# Patient Record
Sex: FEMALE | Race: WHITE | NOT HISPANIC OR LATINO | Employment: UNEMPLOYED | URBAN - METROPOLITAN AREA
[De-identification: names, ages, dates, MRNs, and addresses within clinical notes are randomized per-mention and may not be internally consistent; named-entity substitution may affect disease eponyms.]

---

## 2018-02-02 ENCOUNTER — HOSPITAL ENCOUNTER (EMERGENCY)
Facility: HOSPITAL | Age: 63
Discharge: HOME | End: 2018-02-02
Attending: EMERGENCY MEDICINE
Payer: MEDICARE

## 2018-02-02 VITALS
RESPIRATION RATE: 20 BRPM | BODY MASS INDEX: 21.17 KG/M2 | OXYGEN SATURATION: 99 % | HEIGHT: 59 IN | TEMPERATURE: 98 F | WEIGHT: 105 LBS | SYSTOLIC BLOOD PRESSURE: 175 MMHG | DIASTOLIC BLOOD PRESSURE: 88 MMHG | HEART RATE: 83 BPM

## 2018-02-02 DIAGNOSIS — M54.9 BACK PAIN, UNSPECIFIED BACK LOCATION, UNSPECIFIED BACK PAIN LATERALITY, UNSPECIFIED CHRONICITY: Primary | ICD-10-CM

## 2018-02-02 DIAGNOSIS — R07.9 CHEST PAIN: ICD-10-CM

## 2018-02-02 DIAGNOSIS — M94.0 COSTOCHONDRITIS: ICD-10-CM

## 2018-02-02 LAB
BILIRUB UR QL STRIP: NEGATIVE
CLARITY UR: CLEAR
COLOR UR: YELLOW
GLUCOSE UR QL STRIP: NEGATIVE
HGB UR QL STRIP: ABNORMAL
KETONES UR QL STRIP: NEGATIVE
LEUKOCYTE ESTERASE UR QL STRIP: NEGATIVE
MICROSCOPIC COMMENT: NORMAL
NITRITE UR QL STRIP: NEGATIVE
PH UR STRIP: 6 [PH] (ref 5–8)
PROT UR QL STRIP: NEGATIVE
RBC #/AREA URNS HPF: 2 /HPF (ref 0–4)
SP GR UR STRIP: <=1.005 (ref 1–1.03)
SQUAMOUS #/AREA URNS HPF: 0 /HPF
URN SPEC COLLECT METH UR: ABNORMAL
UROBILINOGEN UR STRIP-ACNC: NEGATIVE EU/DL

## 2018-02-02 PROCEDURE — 93010 ELECTROCARDIOGRAM REPORT: CPT | Mod: ,,, | Performed by: INTERNAL MEDICINE

## 2018-02-02 PROCEDURE — 99284 EMERGENCY DEPT VISIT MOD MDM: CPT | Mod: 25

## 2018-02-02 PROCEDURE — 63600175 PHARM REV CODE 636 W HCPCS: Performed by: PHYSICIAN ASSISTANT

## 2018-02-02 PROCEDURE — 81000 URINALYSIS NONAUTO W/SCOPE: CPT

## 2018-02-02 PROCEDURE — 93005 ELECTROCARDIOGRAM TRACING: CPT

## 2018-02-02 PROCEDURE — 96372 THER/PROPH/DIAG INJ SC/IM: CPT

## 2018-02-02 RX ORDER — KETOROLAC TROMETHAMINE 30 MG/ML
30 INJECTION, SOLUTION INTRAMUSCULAR; INTRAVENOUS
Status: COMPLETED | OUTPATIENT
Start: 2018-02-02 | End: 2018-02-02

## 2018-02-02 RX ORDER — NAPROXEN 500 MG/1
500 TABLET ORAL 2 TIMES DAILY WITH MEALS
Qty: 14 TABLET | Refills: 0 | Status: SHIPPED | OUTPATIENT
Start: 2018-02-02 | End: 2018-03-21

## 2018-02-02 RX ADMIN — KETOROLAC TROMETHAMINE 30 MG: 30 INJECTION, SOLUTION INTRAMUSCULAR at 05:02

## 2018-02-02 NOTE — ED PROVIDER NOTES
"Encounter Date: 2/2/2018       History     Chief Complaint   Patient presents with    Back Pain     pt states she began having lower back about 1 month ago, then pain spread up to her upper back about 1 week ago. States pain has now spread to right shoulder, right neck, and midsternal chest. Pt states she has also been having intermittent palpitations     Gena Lui, a 62 y.o. female with PMH of seizures and CVA that presents to the ED for pain in her lower back x 1 month that now radiates to her upper back and right shoulder with chest pain and palpitations that started about 4 days ago.  Pain to chest is described as "twitches" that come and go.  She's identified no exacerbating or alleviating factors.  Treatments tried include ingestion of goodies with little improvement of her symptoms. She denies any N/V/D.  Does attest to positive smoking history about 1 pack since she was 14 years old.  She does attest to positive smoking history.          The history is provided by the patient.   b  Review of patient's allergies indicates:   Allergen Reactions    Klonopin [clonazepam]     Valium [diazepam]      States she was told to "stay away from the family of valium."    Versed [midazolam]      Intravenous versed causes altered mental status     Past Medical History:   Diagnosis Date    Anxiety     Depression     Hyperlipidemia     Hypertension     Memory loss     Seizures     Stroke      Past Surgical History:   Procedure Laterality Date    ANTERIOR VAGINAL REPAIR      CHOLECYSTECTOMY      EYE SURGERY      TONSILLECTOMY       History reviewed. No pertinent family history.  Social History   Substance Use Topics    Smoking status: Current Every Day Smoker     Types: Cigarettes    Smokeless tobacco: Not on file    Alcohol use No     Review of Systems   Constitutional: Negative for fever.   Respiratory: Negative for cough and shortness of breath.    Cardiovascular: Positive for chest pain and " palpitations.   Gastrointestinal: Negative for diarrhea, nausea and vomiting.   Musculoskeletal: Negative for arthralgias and joint swelling.   Skin: Negative for color change and rash.   Allergic/Immunologic: Negative for immunocompromised state.   Neurological: Negative for dizziness and headaches.   Hematological: Negative for adenopathy.   Psychiatric/Behavioral: Negative for agitation and confusion.   All other systems reviewed and are negative.      Physical Exam     Initial Vitals [02/02/18 1540]   BP Pulse Resp Temp SpO2   (!) 196/93 83 20 97.6 °F (36.4 °C) 99 %      MAP       127.33         Physical Exam    Nursing note and vitals reviewed.  Constitutional: She appears well-developed and well-nourished. She is not diaphoretic. No distress.   HENT:   Head: Normocephalic and atraumatic.   Right Ear: External ear normal.   Left Ear: External ear normal.   Nose: Nose normal.   Mouth/Throat: Oropharynx is clear and moist.   Eyes: Conjunctivae and EOM are normal. Right eye exhibits no discharge. Left eye exhibits no discharge. No scleral icterus.   Neck: Normal range of motion. No tracheal deviation present.   Cardiovascular: Normal rate, regular rhythm and normal heart sounds. Exam reveals no gallop.    Pulmonary/Chest: Breath sounds normal. No respiratory distress. She has no decreased breath sounds. She has no wheezes. She has no rhonchi. She has no rales. She exhibits tenderness and bony tenderness. She exhibits no edema and no swelling.       Abdominal: Soft. Bowel sounds are normal. She exhibits no distension. There is no tenderness. There is no rebound and no guarding.   Musculoskeletal: Normal range of motion.        Cervical back: Normal.        Thoracic back: Normal.        Lumbar back: Normal.   Neurological: She is alert and oriented to person, place, and time.   Skin: Skin is warm and dry. No rash noted. No erythema.   Psychiatric: She has a normal mood and affect. Thought content normal.         ED  Course   Procedures  Labs Reviewed   URINALYSIS     EKG Readings: (Independently Interpreted)   Initial Reading: No STEMI. Rhythm: Normal Sinus Rhythm. Heart Rate: 81. Ectopy: No Ectopy. Conduction: Normal. Axis: Normal.     Imaging Results    None            Medical Decision Making:   Initial Assessment:   Lower back pain x 1 month with radiation to upper back and arm.  Reproducible chest pain  Differential Diagnosis:   Costochondritis, chronic back pain, muscular strain   Clinical Tests:   Lab Tests: Ordered and Reviewed  The following lab test(s) were unremarkable: Urinalysis  Radiological Study: Ordered and Reviewed  ED Management:  Patient presents to ED in NAD, afebrile and non-toxic.  She has no spinous process tenderness or bony step offs.  Normal AROM of bilateral shoulders with no ecchymosis or effusion.  Normal EKG and CXR.  No evidence of UTI.  Patient was given toradol with improvement of her back pain and instructed to f/u with PCP for further evaluation.  Strict return precautions given and patient verbalized understanding.                       ED Course      Clinical Impression:   The primary encounter diagnosis was Back pain, unspecified back location, unspecified back pain laterality, unspecified chronicity. Diagnoses of Chest pain and Costochondritis were also pertinent to this visit.                           Maria Luisa Farooq PA-C  02/02/18 4519

## 2018-02-03 NOTE — ED TRIAGE NOTES
"Pt reports lower back pain for the past 1 month that has been radiating up spine to between shoulder blades over the past week. Pt also reports occasional "twinges" to lower sternal area.   "

## 2018-03-21 ENCOUNTER — LAB VISIT (OUTPATIENT)
Dept: LAB | Facility: HOSPITAL | Age: 63
End: 2018-03-21
Attending: FAMILY MEDICINE
Payer: MEDICARE

## 2018-03-21 ENCOUNTER — OFFICE VISIT (OUTPATIENT)
Dept: FAMILY MEDICINE | Facility: CLINIC | Age: 63
End: 2018-03-21
Payer: MEDICARE

## 2018-03-21 ENCOUNTER — TELEPHONE (OUTPATIENT)
Dept: FAMILY MEDICINE | Facility: CLINIC | Age: 63
End: 2018-03-21

## 2018-03-21 VITALS
SYSTOLIC BLOOD PRESSURE: 126 MMHG | BODY MASS INDEX: 19.69 KG/M2 | HEART RATE: 83 BPM | TEMPERATURE: 98 F | DIASTOLIC BLOOD PRESSURE: 75 MMHG | HEIGHT: 59 IN | WEIGHT: 97.69 LBS

## 2018-03-21 DIAGNOSIS — Z78.0 POSTMENOPAUSAL: ICD-10-CM

## 2018-03-21 DIAGNOSIS — Z11.59 NEED FOR HEPATITIS C SCREENING TEST: ICD-10-CM

## 2018-03-21 DIAGNOSIS — R56.9 SEIZURES: ICD-10-CM

## 2018-03-21 DIAGNOSIS — Z23 NEED FOR SHINGLES VACCINE: ICD-10-CM

## 2018-03-21 DIAGNOSIS — E78.2 MIXED HYPERLIPIDEMIA: ICD-10-CM

## 2018-03-21 DIAGNOSIS — Z86.73 HISTORY OF STROKE: ICD-10-CM

## 2018-03-21 DIAGNOSIS — I10 BENIGN ESSENTIAL HYPERTENSION: ICD-10-CM

## 2018-03-21 DIAGNOSIS — M54.50 CHRONIC BILATERAL LOW BACK PAIN WITHOUT SCIATICA: ICD-10-CM

## 2018-03-21 DIAGNOSIS — Z79.82 LONG-TERM USE OF ASPIRIN THERAPY: ICD-10-CM

## 2018-03-21 DIAGNOSIS — Z00.00 ROUTINE GENERAL MEDICAL EXAMINATION AT A HEALTH CARE FACILITY: ICD-10-CM

## 2018-03-21 DIAGNOSIS — F32.A ANXIETY AND DEPRESSION: ICD-10-CM

## 2018-03-21 DIAGNOSIS — Z72.0 TOBACCO ABUSE: ICD-10-CM

## 2018-03-21 DIAGNOSIS — Z00.00 ROUTINE GENERAL MEDICAL EXAMINATION AT A HEALTH CARE FACILITY: Primary | ICD-10-CM

## 2018-03-21 DIAGNOSIS — Z23 NEEDS FLU SHOT: ICD-10-CM

## 2018-03-21 DIAGNOSIS — Z12.31 ENCOUNTER FOR SCREENING MAMMOGRAM FOR BREAST CANCER: ICD-10-CM

## 2018-03-21 DIAGNOSIS — R45.851 SUICIDAL THOUGHTS: ICD-10-CM

## 2018-03-21 DIAGNOSIS — F41.9 ANXIETY AND DEPRESSION: ICD-10-CM

## 2018-03-21 DIAGNOSIS — G89.29 CHRONIC BILATERAL LOW BACK PAIN WITHOUT SCIATICA: ICD-10-CM

## 2018-03-21 DIAGNOSIS — Z12.11 COLON CANCER SCREENING: ICD-10-CM

## 2018-03-21 LAB
25(OH)D3+25(OH)D2 SERPL-MCNC: 26 NG/ML
ALBUMIN SERPL BCP-MCNC: 4.5 G/DL
ALP SERPL-CCNC: 107 U/L
ALT SERPL W/O P-5'-P-CCNC: 15 U/L
ANION GAP SERPL CALC-SCNC: 8 MMOL/L
AST SERPL-CCNC: 20 U/L
BASOPHILS # BLD AUTO: 0.05 K/UL
BASOPHILS NFR BLD: 0.7 %
BILIRUB SERPL-MCNC: 0.6 MG/DL
BUN SERPL-MCNC: 6 MG/DL
CALCIUM SERPL-MCNC: 10.6 MG/DL
CHLORIDE SERPL-SCNC: 105 MMOL/L
CHOLEST SERPL-MCNC: 247 MG/DL
CHOLEST/HDLC SERPL: 5.6 {RATIO}
CO2 SERPL-SCNC: 28 MMOL/L
CREAT SERPL-MCNC: 0.8 MG/DL
DIFFERENTIAL METHOD: ABNORMAL
EOSINOPHIL # BLD AUTO: 0.1 K/UL
EOSINOPHIL NFR BLD: 0.9 %
ERYTHROCYTE [DISTWIDTH] IN BLOOD BY AUTOMATED COUNT: 13.7 %
EST. GFR  (AFRICAN AMERICAN): >60 ML/MIN/1.73 M^2
EST. GFR  (NON AFRICAN AMERICAN): >60 ML/MIN/1.73 M^2
ESTIMATED AVG GLUCOSE: 91 MG/DL
GLUCOSE SERPL-MCNC: 96 MG/DL
HBA1C MFR BLD HPLC: 4.8 %
HCT VFR BLD AUTO: 44.8 %
HDLC SERPL-MCNC: 44 MG/DL
HDLC SERPL: 17.8 %
HGB BLD-MCNC: 14.9 G/DL
LDLC SERPL CALC-MCNC: 153.8 MG/DL
LYMPHOCYTES # BLD AUTO: 1.5 K/UL
LYMPHOCYTES NFR BLD: 20.5 %
MCH RBC QN AUTO: 31.6 PG
MCHC RBC AUTO-ENTMCNC: 33.3 G/DL
MCV RBC AUTO: 95 FL
MONOCYTES # BLD AUTO: 0.7 K/UL
MONOCYTES NFR BLD: 8.9 %
NEUTROPHILS # BLD AUTO: 5.2 K/UL
NEUTROPHILS NFR BLD: 68.6 %
NONHDLC SERPL-MCNC: 203 MG/DL
PLATELET # BLD AUTO: 261 K/UL
PMV BLD AUTO: 10 FL
POTASSIUM SERPL-SCNC: 4.5 MMOL/L
PROT SERPL-MCNC: 8.4 G/DL
RBC # BLD AUTO: 4.71 M/UL
SODIUM SERPL-SCNC: 141 MMOL/L
TRIGL SERPL-MCNC: 246 MG/DL
TSH SERPL DL<=0.005 MIU/L-ACNC: 2.76 UIU/ML
WBC # BLD AUTO: 7.52 K/UL

## 2018-03-21 PROCEDURE — 36415 COLL VENOUS BLD VENIPUNCTURE: CPT

## 2018-03-21 PROCEDURE — 85025 COMPLETE CBC W/AUTO DIFF WBC: CPT

## 2018-03-21 PROCEDURE — 86803 HEPATITIS C AB TEST: CPT

## 2018-03-21 PROCEDURE — 80053 COMPREHEN METABOLIC PANEL: CPT

## 2018-03-21 PROCEDURE — 82306 VITAMIN D 25 HYDROXY: CPT

## 2018-03-21 PROCEDURE — 99386 PREV VISIT NEW AGE 40-64: CPT | Mod: 25,S$GLB,, | Performed by: FAMILY MEDICINE

## 2018-03-21 PROCEDURE — 80061 LIPID PANEL: CPT

## 2018-03-21 PROCEDURE — 84443 ASSAY THYROID STIM HORMONE: CPT

## 2018-03-21 PROCEDURE — 90686 IIV4 VACC NO PRSV 0.5 ML IM: CPT | Mod: S$GLB,,, | Performed by: FAMILY MEDICINE

## 2018-03-21 PROCEDURE — 99499 UNLISTED E&M SERVICE: CPT | Mod: S$GLB,,, | Performed by: FAMILY MEDICINE

## 2018-03-21 PROCEDURE — G0008 ADMIN INFLUENZA VIRUS VAC: HCPCS | Mod: S$GLB,,, | Performed by: FAMILY MEDICINE

## 2018-03-21 PROCEDURE — 99999 PR PBB SHADOW E&M-EST. PATIENT-LVL V: CPT | Mod: PBBFAC,,, | Performed by: FAMILY MEDICINE

## 2018-03-21 PROCEDURE — 83036 HEMOGLOBIN GLYCOSYLATED A1C: CPT

## 2018-03-21 RX ORDER — LEVETIRACETAM 500 MG/1
500 TABLET ORAL 2 TIMES DAILY
Qty: 180 TABLET | Refills: 3 | Status: SHIPPED | OUTPATIENT
Start: 2018-03-21 | End: 2018-12-26 | Stop reason: SDUPTHER

## 2018-03-21 RX ORDER — ATORVASTATIN CALCIUM 20 MG/1
20 TABLET, FILM COATED ORAL DAILY
Qty: 90 TABLET | Refills: 3 | Status: SHIPPED | OUTPATIENT
Start: 2018-03-21 | End: 2019-01-31

## 2018-03-21 RX ORDER — FLUOXETINE HYDROCHLORIDE 40 MG/1
40 CAPSULE ORAL DAILY
Qty: 90 CAPSULE | Refills: 3 | Status: SHIPPED | OUTPATIENT
Start: 2018-03-21 | End: 2019-01-31

## 2018-03-21 RX ORDER — ALPRAZOLAM 1 MG/1
TABLET ORAL
Qty: 30 TABLET | Refills: 2 | Status: SHIPPED | OUTPATIENT
Start: 2018-03-21 | End: 2018-04-09 | Stop reason: DRUGHIGH

## 2018-03-21 RX ORDER — NAPROXEN SODIUM 220 MG/1
81 TABLET, FILM COATED ORAL NIGHTLY
COMMUNITY
End: 2022-08-01 | Stop reason: SDUPTHER

## 2018-03-21 RX ORDER — VENLAFAXINE 100 MG/1
TABLET ORAL
Qty: 180 TABLET | Refills: 3 | Status: SHIPPED | OUTPATIENT
Start: 2018-03-21 | End: 2018-03-21 | Stop reason: CLARIF

## 2018-03-21 NOTE — PROGRESS NOTES
Office Visit    Patient Name: Gena Lui    : 1955  MRN: [id number]    Subjective:  Gena is a 62 y.o. female who presents today for:    Establish Care    61 yo female who resented to ER on 2018 for back pain * 1 month and d/c'd with DX of LBP and costochondritis s/p unremarkable CXR and EKG and improvement with toradol.   She reports a PMH of seizures and CVA (per CT  10/19/2017- chronic right parietal subcortical infarct) with underlying HTN and HLD. Currently only on aspirin. Also anxiety/depression. She is a smoker-- 1PPD for >40 years. She is a regular drinkerShe has a seizure history and has been off of Keppra for 2 years with some worsening frequency.     She is here today for new patient physical and to establish care. She is currently living with her grandchildren at her home in [place].     Gena Lui presents today for annual wellness exam.      She is postmenopausal.  She does not have a gynecologist and is overdue for pap.    They have been feeling stable-- no acute issues.     General lifestyle habits are as follows:  Diet is described as poor-- eats one meals per day due to poor appetite, exercise is described as fair-- [place], sleep is described as poor-- only 2-3 hour stretches at a time. Weight is poor-- loosing weight and with poor appetite and diet.     Immunizations: TDaP  in ED, FLU due, Pneumovax 23 given early due to smoking history, zostavax due    Screening Tests: PAP, mammogram ordered, colonoscopy due and ordered, hep C screening ordered today , early DEXA due to smoking history ordered    Eye/Dental: both due            Past Medical History  Past Medical History:   Diagnosis Date    Anxiety and depression 3/21/2018    Benign essential hypertension 3/21/2018    Chronic bilateral low back pain without sciatica 3/21/2018    History of stroke 3/21/2018    Memory loss     Mixed hyperlipidemia 3/21/2018    Seizures   "      Past Surgical History  Past Surgical History:   Procedure Laterality Date    ANTERIOR VAGINAL REPAIR      CHOLECYSTECTOMY      EYE SURGERY      TONSILLECTOMY         Family History  History reviewed. No pertinent family history.    Social History  Social History     Social History    Marital status:      Spouse name: N/A    Number of children: N/A    Years of education: N/A     Occupational History    Not on file.     Social History Main Topics    Smoking status: Current Every Day Smoker     Types: Cigarettes    Smokeless tobacco: Never Used      Comment: 1 PPD    Alcohol use Yes      Comment: 1-2 drinks daily    Drug use: Yes     Types: Marijuana    Sexual activity: Not on file     Other Topics Concern    Not on file     Social History Narrative    No narrative on file       Current Medications  Medications reviewed and updated.     Allergies   Review of patient's allergies indicates:   Allergen Reactions    Klonopin [clonazepam]     Valium [diazepam]      States she was told to "stay away from the family of valium."    Versed [midazolam]      Intravenous versed causes altered mental status       Review of Systems (Pertinent positives)  Review of Systems   Constitutional: Negative for unexpected weight change.   HENT: Positive for congestion and hearing loss.    Respiratory: Positive for shortness of breath.    Cardiovascular: Positive for palpitations. Negative for chest pain.   Gastrointestinal: Positive for diarrhea (loose stool).   Genitourinary: Negative for dysuria.   Musculoskeletal: Positive for back pain.   Allergic/Immunologic: Positive for environmental allergies.   Neurological: Positive for seizures. Negative for syncope.   Psychiatric/Behavioral: Positive for dysphoric mood, sleep disturbance and suicidal ideas. The patient is nervous/anxious.        /75 (BP Location: Left arm, Patient Position: Sitting, BP Method: Medium (Automatic))   Pulse 83   Temp 98 °F " "(36.7 °C)   Ht 4' 11" (1.499 m)   Wt 44.3 kg (97 lb 10.6 oz)   BMI 19.73 kg/m²     Physical Exam   Constitutional: She is oriented to person, place, and time. She appears well-developed. No distress.   Frail, poorly nourished   HENT:   Head: Normocephalic and atraumatic.   Right Ear: Ear canal normal. Tympanic membrane is not erythematous and not bulging.   Left Ear: Ear canal normal. Tympanic membrane is not erythematous and not bulging.   Nose: Rhinorrhea present.   Mouth/Throat: Dental caries present. No oropharyngeal exudate.   Eyes: Conjunctivae are normal.   Neck: Carotid bruit is not present. No thyroid mass and no thyromegaly present.   Cardiovascular: Normal rate, regular rhythm and normal heart sounds.    No murmur heard.  Pulses:       Dorsalis pedis pulses are 2+ on the right side, and 2+ on the left side.   Pulmonary/Chest: Effort normal and breath sounds normal. No respiratory distress.   Abdominal: Soft. Bowel sounds are normal. She exhibits no distension and no mass. There is no hepatosplenomegaly. There is no tenderness.   Musculoskeletal: Normal range of motion.   Lymphadenopathy:     She has no cervical adenopathy.   Neurological: She is alert and oriented to person, place, and time.   Skin: Skin is warm and dry. No rash noted.   Psychiatric: She has a normal mood and affect.   Vitals reviewed.        Assessment/Plan:  Gena Lui is a 62 y.o. female who presents today for :    Gena was seen today for establish care.    Diagnoses and all orders for this visit:    Routine general medical examination at a health care facility  Comments:  multiple areas of health care mintenance due and ordered.  follow up in 6 weeks   Orders:  -     Hemoglobin A1c; Future  -     Comprehensive metabolic panel; Future  -     Lipid panel; Future  -     CBC auto differential; Future  -     TSH; Future  -     Hepatitis C antibody; Future  -     Vitamin D; Future    Anxiety and depression  -     Discontinue: " venlafaxine (EFFEXOR) 100 MG Tab; Take twice daily for depression and anxiety- start w/ 1/2 tab twice daily for 1st 2 weeks  -     FLUoxetine (PROZAC) 40 MG capsule; Take 1 capsule (40 mg total) by mouth once daily.  -     ALPRAZolam (XANAX) 1 MG tablet; 1/2-1 tab daily as needed for severe anxiety    Benign essential hypertension  -     Comprehensive metabolic panel; Future  -     Lipid panel; Future  -     CBC auto differential; Future  -     TSH; Future  -     atorvastatin (LIPITOR) 20 MG tablet; Take 1 tablet (20 mg total) by mouth once daily.  -     Ambulatory referral to Optometry    Mixed hyperlipidemia  -     Comprehensive metabolic panel; Future  -     Lipid panel; Future  -     TSH; Future  -     atorvastatin (LIPITOR) 20 MG tablet; Take 1 tablet (20 mg total) by mouth once daily.    History of stroke  -     Comprehensive metabolic panel; Future  -     Lipid panel; Future  -     Ambulatory referral to Optometry    Chronic bilateral low back pain without sciatica    Need for hepatitis C screening test  -     Hepatitis C antibody; Future    Encounter for screening mammogram for breast cancer  -     Mammo Digital Screening Bilat with CAD; Future    Postmenopausal  -     DXA Bone Density Spine And Hip; Future    Long-term use of aspirin therapy    Seizures  -     levETIRAcetam (KEPPRA) 500 MG Tab; Take 1 tablet (500 mg total) by mouth 2 (two) times daily.    Colon cancer screening  -     Case request GI: COLONOSCOPY    Body mass index (BMI) 19.9 or less, adult    Suicidal thoughts  Comments:  safety precautions in place (firearm out of home), safety plan discussed, treating depression  Orders:  -     Discontinue: venlafaxine (EFFEXOR) 100 MG Tab; Take twice daily for depression and anxiety- start w/ 1/2 tab twice daily for 1st 2 weeks  -     FLUoxetine (PROZAC) 40 MG capsule; Take 1 capsule (40 mg total) by mouth once daily.    Needs flu shot  -     Influenza - Quadrivalent (3 years & older) (PF)    Need for  shingles vaccine    Tobacco abuse            ICD-10-CM ICD-9-CM    1. Routine general medical examination at a health care facility Z00.00 V70.0 Hemoglobin A1c      Comprehensive metabolic panel      Lipid panel      CBC auto differential      TSH      Hepatitis C antibody      Vitamin D    multiple areas of health care mintenance due and ordered.  follow up in 6 weeks    2. Anxiety and depression F41.8 300.00 FLUoxetine (PROZAC) 40 MG capsule     311 ALPRAZolam (XANAX) 1 MG tablet      DISCONTINUED: venlafaxine (EFFEXOR) 100 MG Tab   3. Benign essential hypertension I10 401.1 Comprehensive metabolic panel      Lipid panel      CBC auto differential      TSH      atorvastatin (LIPITOR) 20 MG tablet      Ambulatory referral to Optometry   4. Mixed hyperlipidemia E78.2 272.2 Comprehensive metabolic panel      Lipid panel      TSH      atorvastatin (LIPITOR) 20 MG tablet   5. History of stroke Z86.73 V12.54 Comprehensive metabolic panel      Lipid panel      Ambulatory referral to Optometry   6. Chronic bilateral low back pain without sciatica M54.5 724.2     G89.29 338.29    7. Need for hepatitis C screening test Z11.59 V73.89 Hepatitis C antibody   8. Encounter for screening mammogram for breast cancer Z12.31 V76.12 Mammo Digital Screening Bilat with CAD   9. Postmenopausal Z78.0 V49.81 DXA Bone Density Spine And Hip   10. Long-term use of aspirin therapy Z79.82 V58.66    11. Seizures R56.9 780.39 levETIRAcetam (KEPPRA) 500 MG Tab   12. Colon cancer screening Z12.11 V76.51 Case request GI: COLONOSCOPY   13. Body mass index (BMI) 19.9 or less, adult Z68.1 [id number]    14. Suicidal thoughts R45.851 V62.84 FLUoxetine (PROZAC) 40 MG capsule      DISCONTINUED: venlafaxine (EFFEXOR) 100 MG Tab    safety precautions in place (firearm out of home), safety plan discussed, treating depression   15. Needs flu shot Z23 V04.81 Influenza - Quadrivalent (3 years & older) (PF)   16. Need for shingles vaccine Z23 V04.89    17. Tobacco  abuse Z72.0 305.1        There are no Patient Instructions on file for this visit.      Follow-up in about 6 weeks (around 5/2/2018) for return as needed for new concerns.

## 2018-03-22 LAB — HCV AB SERPL QL IA: NEGATIVE

## 2018-03-23 ENCOUNTER — HOSPITAL ENCOUNTER (OUTPATIENT)
Dept: RADIOLOGY | Facility: HOSPITAL | Age: 63
Discharge: HOME | End: 2018-03-23
Attending: FAMILY MEDICINE
Payer: MEDICARE

## 2018-03-23 DIAGNOSIS — Z78.0 POSTMENOPAUSAL: ICD-10-CM

## 2018-03-23 DIAGNOSIS — Z12.31 ENCOUNTER FOR SCREENING MAMMOGRAM FOR BREAST CANCER: ICD-10-CM

## 2018-03-23 PROCEDURE — 77067 SCR MAMMO BI INCL CAD: CPT | Mod: 26,,, | Performed by: RADIOLOGY

## 2018-03-23 PROCEDURE — 77063 BREAST TOMOSYNTHESIS BI: CPT | Mod: 26,,, | Performed by: RADIOLOGY

## 2018-03-23 PROCEDURE — 77080 DXA BONE DENSITY AXIAL: CPT | Mod: 26,,, | Performed by: RADIOLOGY

## 2018-03-23 PROCEDURE — 77067 SCR MAMMO BI INCL CAD: CPT | Mod: TC

## 2018-03-23 PROCEDURE — 77080 DXA BONE DENSITY AXIAL: CPT | Mod: TC

## 2018-03-24 ENCOUNTER — TELEPHONE (OUTPATIENT)
Dept: FAMILY MEDICINE | Facility: CLINIC | Age: 63
End: 2018-03-24

## 2018-03-24 DIAGNOSIS — M81.0 POSTMENOPAUSAL OSTEOPOROSIS: ICD-10-CM

## 2018-03-24 PROBLEM — E55.9 VITAMIN D DEFICIENCY: Status: ACTIVE | Noted: 2018-03-24

## 2018-03-24 NOTE — TELEPHONE ENCOUNTER
Hi, Megan, I would like for Gena Lui to start PROLIA for osteoporosis treatment if it is affordable for her, thanks, dr Flores

## 2018-03-28 ENCOUNTER — TELEPHONE (OUTPATIENT)
Dept: FAMILY MEDICINE | Facility: CLINIC | Age: 63
End: 2018-03-28

## 2018-03-28 NOTE — TELEPHONE ENCOUNTER
Called pt to review lab results: labs are notable for elevated triglycerides and low vitamin D, but otherwise levels look ok.  Re-starting the Lipitor as we discussed in office visit and healthy eating will help the triglycerides.  For the vitamin D I would advise between 2,000-5,000 IU daily of over the counter D. This is especially important as bone density test does show osteoporosis.  I have sent a message to Megan to see if therapy plan for PROLIA will be affordable for Gena to treat the osteoporosis.-- Left a message requesting a call back.

## 2018-03-29 ENCOUNTER — TELEPHONE (OUTPATIENT)
Dept: FAMILY MEDICINE | Facility: CLINIC | Age: 63
End: 2018-03-29

## 2018-03-29 DIAGNOSIS — M81.0 POSTMENOPAUSAL OSTEOPOROSIS: Primary | ICD-10-CM

## 2018-03-29 NOTE — TELEPHONE ENCOUNTER
Patient stated that she would like to discuss a alternative treatment instead of the Prolia injections due to fear of needles.  Wanted to know if it is ok to take Effexor with Prozac?  Also, She stated that Xanax is too strong and makes her too tired to do anything throughout the day, wants to know if it is ok to split pill into fours? Please advise.

## 2018-03-29 NOTE — TELEPHONE ENCOUNTER
Called pt to review lab results: labs are notable for elevated triglycerides and low vitamin D, but otherwise levels look ok.  Re-starting the Lipitor as we discussed in office visit and healthy eating will help the triglycerides.  For the vitamin D I would advise between 2,000-5,000 IU daily of over the counter D. This is especially important as bone density test does show osteoporosis.  I have sent a message to Megan to see if therapy plan for PROLIA will be affordable for Gena to treat the osteoporosis.  Patient verbalized understanding.

## 2018-04-03 ENCOUNTER — TELEPHONE (OUTPATIENT)
Dept: GASTROENTEROLOGY | Facility: CLINIC | Age: 63
End: 2018-04-03

## 2018-04-03 DIAGNOSIS — Z12.11 COLON CANCER SCREENING: Primary | ICD-10-CM

## 2018-04-03 RX ORDER — ALENDRONATE SODIUM 70 MG/1
70 TABLET ORAL
Qty: 4 TABLET | Refills: 11 | Status: SHIPPED | OUTPATIENT
Start: 2018-04-03 | End: 2018-10-05

## 2018-04-03 RX ORDER — CHOLECALCIFEROL (VITAMIN D3) 25 MCG
1000 TABLET ORAL DAILY
COMMUNITY
End: 2019-01-27

## 2018-04-03 NOTE — TELEPHONE ENCOUNTER
Colonoscopy Referral    Referring Physician: Dr. Flores       Date: 04/19/2018    Reason for Referral: Screening Colon    Family History of: None   Colon polyp:  Relationship/Age of Onset:     Colon cancer:   Relationship/Age of Onset:     Patient with:   Hemoccults Done: no     Iron deficient: no     On Blood Thinner: yes, aspirin 81mg     Valvular heart disease/valve replacement: no     Anemia Present: no     On NSAID: no     Lung disease: no     Kidney disease: no     Hx of polyps: No     Hx of colon cancer: No     Previous colon evalations:  Yes   When: 2008  Where: [place] [facility]   Pertinent symptoms:       Review of patient's allergies indicates:  Versed     Patient was scheduled for colonoscopy on 04/19/2018 with Dr. Goncalves at [facility]. Split dose instructions were reviewed with patient.

## 2018-04-04 NOTE — TELEPHONE ENCOUNTER
I sent in weekly Fosamax (to [facility] here in [place]) for her to take in pill form, and she can try this provided she is not having side effects like stomach irritation.  We will recheck bone density in 2 years to see if the Fosamax is working. My understanding is that she did not want to take Effexor because it previously did not work for her, so I sent in the PROZAC to be taken INSTEAD of effexor, not to be in both,  She should only be on the prozac. As far as the xanax it is fine to split in to 4s. IF this works ok then that's great, but if not I can send in the 1/2 mg dose instead of the ones and then she can break those in 1/2.  Let me know if new RX is needed thanks.

## 2018-04-05 ENCOUNTER — TELEPHONE (OUTPATIENT)
Dept: FAMILY MEDICINE | Facility: CLINIC | Age: 63
End: 2018-04-05

## 2018-04-05 NOTE — TELEPHONE ENCOUNTER
Called patient to notify that Fosamax  Was sent to [facility] here in [place] for her to take in pill form, and she can try this provided she is not having side effects like stomach irritation.  We will recheck bone density in 2 years to see if the Fosamax is working. My understanding is that she did not want to take Effexor because it previously did not work for her, so I sent in the PROZAC to be taken INSTEAD of effexor, not to be in both,  She should only be on the prozac. As far as the xanax it is fine to split in to 4s. IF this works ok then that's great, but if not I can send in the 1/2 mg dose instead of the ones and then she can break those in 1/2.  Patient verbalized understanding.

## 2018-04-05 NOTE — TELEPHONE ENCOUNTER
Spoke with patient, stated that she would like the Xanax 1/2mg instead of the 1mg.  Please advise.

## 2018-04-09 RX ORDER — ALPRAZOLAM 0.5 MG/1
0.5 TABLET ORAL DAILY PRN
Qty: 30 TABLET | Refills: 2 | Status: SHIPPED | OUTPATIENT
Start: 2018-04-09 | End: 2018-10-05 | Stop reason: SDUPTHER

## 2018-04-10 ENCOUNTER — OFFICE VISIT (OUTPATIENT)
Dept: OPTOMETRY | Facility: CLINIC | Age: 63
End: 2018-04-10
Payer: MEDICARE

## 2018-04-10 DIAGNOSIS — I10 BENIGN ESSENTIAL HYPERTENSION: ICD-10-CM

## 2018-04-10 DIAGNOSIS — H26.492 AFTER-CATARACT OBSCURING VISION, LEFT: Primary | ICD-10-CM

## 2018-04-10 PROCEDURE — 99999 PR PBB SHADOW E&M-EST. PATIENT-LVL II: CPT | Mod: PBBFAC,,, | Performed by: OPTOMETRIST

## 2018-04-10 PROCEDURE — 92004 COMPRE OPH EXAM NEW PT 1/>: CPT | Mod: S$GLB,,, | Performed by: OPTOMETRIST

## 2018-04-10 NOTE — LETTER
April 10, 2018      Rachelle Flores MD  [address]  [address]  [facility] [address]           [facility] - Optometry  [address]  [place] [address]  Phone: [phone]  Fax: [phone]          Patient: Gena Lui   MR Number: [id number]   YOB: 1955   Date of Visit: 4/10/2018       Dear Dr. Rachelle Flores:    Thank you for referring Gena Lui to me for evaluation. Attached you will find relevant portions of my assessment and plan of care.    If you have questions, please do not hesitate to call me. I look forward to following Gena Lui along with you.    Sincerely,    Bee Briceno, OD    Enclosure  CC:  No Recipients    If you would like to receive this communication electronically, please contact [email or web address] or [phone] to request more information on [facility] access.    For providers and/or their staff who would like to refer a patient to [facility], please contact us through our one-stop-shop provider referral line, [facility], at [phone].    If you feel you have received this communication in error or would no longer like to receive these types of communications, please e-mail [email or web address]

## 2018-04-10 NOTE — PROGRESS NOTES
"HPI     Patient's last dilated exam was: >10 years    Pt here for routine eye exam. Had pink eye apprx 5-7  years ago, has not   seen as clear since in her OS for distance. Had CE OU 2007. Patient denies   flashes, floaters, pain and double vision. Does not use any gtts. Sees   white dots in her field of vision, feels they may be due to her seizures.   When she bent down the other day, after she stood up and turned around,   she began seeing white dots. Says she thinks she also sees them before she   has a seizure. Her daughter is with her today, she is unsure how often she   has seizures. Says he has "absent seizures", she does not know when she   has them, unsure when the last time she had one. She has lapses in time   with her memory. Also sees green before she has a seizure. Pt has a very   difficult time answering questions.   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 Pt had hx of   stroke apprx 10 years ago. Says her blood pressure is well controlled on   meds.     10/16/2007 PCIOL OD Model [id number]  25.0  2.25 cyl Dr. Ramsey  10/30/2007 PCIOL OS Model [id number]  24.0  3.00 cyl Dr. Ramsey               Last edited by Taniya Renae, PCT on 4/10/2018 11:30 AM.   (History)            Assessment /Plan     For exam results, see Encounter Report.    After-cataract obscuring vision, left    Benign essential hypertension            1.  Educated pt on PCO--feel this cause of decrease vision.  Refer for YAG OS.  Will recheck vision and rx after procedure.  Dr. Ramsey did cataract surgery--sent for old records.    2.  No retinopathy--monitor yearly.  BP control.                   "

## 2018-04-19 ENCOUNTER — ANESTHESIA (OUTPATIENT)
Dept: ENDOSCOPY | Facility: HOSPITAL | Age: 63
End: 2018-04-19
Payer: MEDICARE

## 2018-04-19 ENCOUNTER — ANESTHESIA EVENT (OUTPATIENT)
Dept: ENDOSCOPY | Facility: HOSPITAL | Age: 63
End: 2018-04-19
Payer: MEDICARE

## 2018-04-19 ENCOUNTER — SURGERY (OUTPATIENT)
Age: 63
End: 2018-04-19

## 2018-04-19 ENCOUNTER — HOSPITAL ENCOUNTER (OUTPATIENT)
Facility: HOSPITAL | Age: 63
Discharge: HOME | End: 2018-04-19
Attending: INTERNAL MEDICINE | Admitting: INTERNAL MEDICINE
Payer: MEDICARE

## 2018-04-19 VITALS
TEMPERATURE: 98 F | SYSTOLIC BLOOD PRESSURE: 127 MMHG | BODY MASS INDEX: 19.56 KG/M2 | HEIGHT: 59 IN | DIASTOLIC BLOOD PRESSURE: 82 MMHG | WEIGHT: 97 LBS | RESPIRATION RATE: 16 BRPM | OXYGEN SATURATION: 100 % | HEART RATE: 64 BPM

## 2018-04-19 DIAGNOSIS — Z12.11 SCREENING FOR MALIGNANT NEOPLASM OF COLON: ICD-10-CM

## 2018-04-19 PROCEDURE — 45385 COLONOSCOPY W/LESION REMOVAL: CPT | Mod: PT,,, | Performed by: INTERNAL MEDICINE

## 2018-04-19 PROCEDURE — 88305 TISSUE EXAM BY PATHOLOGIST: CPT | Performed by: PATHOLOGY

## 2018-04-19 PROCEDURE — 37000009 HC ANESTHESIA EA ADD 15 MINS: Performed by: INTERNAL MEDICINE

## 2018-04-19 PROCEDURE — 88305 TISSUE EXAM BY PATHOLOGIST: CPT | Mod: 26,,, | Performed by: PATHOLOGY

## 2018-04-19 PROCEDURE — 27201089 HC SNARE, DISP (ANY): Performed by: INTERNAL MEDICINE

## 2018-04-19 PROCEDURE — 37000008 HC ANESTHESIA 1ST 15 MINUTES: Performed by: INTERNAL MEDICINE

## 2018-04-19 PROCEDURE — 45385 COLONOSCOPY W/LESION REMOVAL: CPT | Performed by: INTERNAL MEDICINE

## 2018-04-19 PROCEDURE — 63600175 PHARM REV CODE 636 W HCPCS: Performed by: NURSE ANESTHETIST, CERTIFIED REGISTERED

## 2018-04-19 PROCEDURE — 25000003 PHARM REV CODE 250: Performed by: INTERNAL MEDICINE

## 2018-04-19 RX ORDER — SODIUM CHLORIDE 9 MG/ML
INJECTION, SOLUTION INTRAVENOUS CONTINUOUS
Status: DISCONTINUED | OUTPATIENT
Start: 2018-04-19 | End: 2018-04-19 | Stop reason: HOSPADM

## 2018-04-19 RX ORDER — PROPOFOL 10 MG/ML
VIAL (ML) INTRAVENOUS CONTINUOUS PRN
Status: DISCONTINUED | OUTPATIENT
Start: 2018-04-19 | End: 2018-04-19

## 2018-04-19 RX ORDER — LIDOCAINE HCL/PF 100 MG/5ML
SYRINGE (ML) INTRAVENOUS
Status: DISCONTINUED | OUTPATIENT
Start: 2018-04-19 | End: 2018-04-19

## 2018-04-19 RX ORDER — PROPOFOL 10 MG/ML
VIAL (ML) INTRAVENOUS
Status: DISCONTINUED | OUTPATIENT
Start: 2018-04-19 | End: 2018-04-19

## 2018-04-19 RX ADMIN — SODIUM CHLORIDE: 0.9 INJECTION, SOLUTION INTRAVENOUS at 07:04

## 2018-04-19 RX ADMIN — LIDOCAINE HYDROCHLORIDE 80 MG: 20 INJECTION, SOLUTION INTRAVENOUS at 08:04

## 2018-04-19 RX ADMIN — PROPOFOL 50 MG: 10 INJECTION, EMULSION INTRAVENOUS at 08:04

## 2018-04-19 RX ADMIN — PROPOFOL 140 MCG/KG/MIN: 10 INJECTION, EMULSION INTRAVENOUS at 08:04

## 2018-04-19 NOTE — H&P
[facility]  Gastroenterology  H&P    Patient Name: Gena Lui  MRN: [id number]  Admission Date: 4/19/2018  Code Status: No Order    Attending Provider: Yi Goncalves MD   Primary Care Physician: Rachelle Flores MD  Principal Problem:<principal problem not specified>    Subjective:     History of Present Illness: Colon cancer screening    Past Medical History:   Diagnosis Date    Anxiety and depression 3/21/2018    Benign essential hypertension 3/21/2018    Chronic bilateral low back pain without sciatica 3/21/2018    History of stroke 3/21/2018    Memory loss     Mixed hyperlipidemia 3/21/2018    Seizures        Past Surgical History:   Procedure Laterality Date    ANTERIOR VAGINAL REPAIR      CATARACT EXTRACTION Right 10/16/2007    Dr. Ramsey     CATARACT EXTRACTION W/  INTRAOCULAR LENS IMPLANT Left 10/30/2007    Dr. Ramsey    CHOLECYSTECTOMY      EYE SURGERY      TONSILLECTOMY         Review of patient's allergies indicates:   Allergen Reactions    Versed [midazolam]      Intravenous versed causes altered mental status     Family History     None        Social History Main Topics    Smoking status: Current Every Day Smoker     Types: Cigarettes    Smokeless tobacco: Never Used      Comment: 1 PPD    Alcohol use Yes      Comment: 1-2 drinks daily    Drug use: Yes     Types: Marijuana    Sexual activity: Not on file     Review of Systems   Constitutional: Negative for appetite change and unexpected weight change.   Cardiovascular: Negative for chest pain and leg swelling.   Gastrointestinal: Negative for abdominal distention and abdominal pain.     Objective:     Vital Signs (Most Recent):  Temp: 97.9 °F (36.6 °C) (04/19/18 0741)  Pulse: 71 (04/19/18 0741)  Resp: 18 (04/19/18 0741)  BP: 128/69 (04/19/18 0741)  SpO2: 100 % (04/19/18 0741) Vital Signs (24h Range):  Temp:  [97.9 °F (36.6 °C)] 97.9 °F (36.6 °C)  Pulse:  [71] 71  Resp:  [18] 18  SpO2:  [100 %] 100 %  BP:  (128)/(69) 128/69     Weight: 44 kg (97 lb) (04/19/18 0741)  Body mass index is 19.59 kg/m².    No intake or output data in the 24 hours ending 04/19/18 0745    Lines/Drains/Airways     Peripheral Intravenous Line                 Peripheral IV - Single Lumen 04/19/18 0743 Right Forearm less than 1 day                Physical Exam   Constitutional: She is oriented to person, place, and time. She appears well-developed and well-nourished. No distress.   HENT:   Head: Normocephalic and atraumatic.   Eyes: Conjunctivae are normal. No scleral icterus.   Neck: Normal range of motion. Neck supple. No tracheal deviation present. No thyromegaly present.   Cardiovascular: Normal rate and regular rhythm.  Exam reveals no gallop and no friction rub.    No murmur heard.  Pulmonary/Chest: Effort normal and breath sounds normal. No respiratory distress. She has no wheezes.   Abdominal: Soft. Bowel sounds are normal. She exhibits no distension. There is no tenderness.   Musculoskeletal:        Right wrist: She exhibits normal range of motion and no tenderness.        Left wrist: She exhibits normal range of motion and no tenderness.   Lymphadenopathy:        Head (right side): No submental and no submandibular adenopathy present.        Head (left side): No submental and no submandibular adenopathy present.   Neurological: She is alert and oriented to person, place, and time.   Skin: Skin is warm and dry. No rash noted. She is not diaphoretic. No erythema.   Psychiatric: She has a normal mood and affect. Her behavior is normal.   Nursing note and vitals reviewed.        Assessment/Plan:     Active Diagnoses:    Diagnosis Date Noted POA    Screening for malignant neoplasm of colon [Z12.11] 04/19/2018 Not Applicable      Problems Resolved During this Admission:    Diagnosis Date Noted Date Resolved POA       Plan  1. Screening colonoscopy    Yi Goncalves MD  Gastroenterology  [facility]

## 2018-04-19 NOTE — TRANSFER OF CARE
"Anesthesia Transfer of Care Note    Patient: Gena Lui    Procedure(s) Performed: Procedure(s) (LRB):  COLONOSCOPY (N/A)    Patient location: GI    Anesthesia Type: MAC    Transport from OR: Transported from OR on room air with adequate spontaneous ventilation    Post pain: adequate analgesia    Post assessment: no apparent anesthetic complications    Post vital signs: stable    Level of consciousness: awake, alert and oriented    Nausea/Vomiting: no nausea/vomiting    Complications: none    Transfer of care protocol was followed      Last vitals:   Visit Vitals  /69 (Patient Position: Lying)   Pulse 71   Temp 36.6 °C (97.9 °F) (Oral)   Resp 18   Ht 4' 11" (1.499 m)   Wt 44 kg (97 lb)   LMP  (LMP Unknown)   SpO2 100%   BMI 19.59 kg/m²     "

## 2018-04-19 NOTE — PROVATION PATIENT INSTRUCTIONS
Discharge Summary/Instructions after an Endoscopic Procedure  Patient Name: Gena Lui  Patient MRN: [id number]  Patient YOB: 1955  Thursday, April 19, 2018  Yi Goncalves MD  RESTRICTIONS:  During your procedure today, you received medications for sedation.  These   medications may affect your judgment, balance and coordination.  Therefore,   for 24 hours, you have the following restrictions:   - DO NOT drive a car, operate machinery, make legal/financial decisions,   sign important papers or drink alcohol.    ACTIVITY:  The following day: return to full activity including work, except no heavy   lifting, straining or running for 3 days if polyps were removed.  DIET:  Eat and drink normally unless instructed otherwise.     TREATMENT FOR COMMON SIDE EFFECTS:  - Mild abdominal pain, nausea, belching, bloating or excessive gas:  rest,   eat lightly and use a heating pad.  - Sore Throat: treat with throat lozenges and/or gargle with warm salt   water.  - Because air was used during the procedure, expelling large amounts of air   from your rectum or belching is normal.  - If a bowel prep was taken, you may not have a bowel movement for 1-3 days.    This is normal.  SYMPTOMS TO WATCH FOR AND REPORT TO YOUR PHYSICIAN:  1. Abdominal pain or bloating, other than gas cramps.  2. Chest pain.  3. Back pain.  4. Signs of infection such as: chills or fever occurring within 24 hours   after the procedure.  5. Rectal bleeding, which would show as bright red, maroon, or black stools.   (A tablespoon of blood from the rectum is not serious, especially if   hemorrhoids are present.)  6. Vomiting.  7. Weakness or dizziness.  GO DIRECTLY TO THE NEAREST EMERGENCY ROOM IF YOU HAVE ANY OF THE FOLLOWING:      Difficulty breathing              Chills and/or fever over 101 F   Persistent vomiting and/or vomiting blood   Severe abdominal pain   Severe chest pain   Black, tarry stools   Bleeding- more than one tablespoon   Any  other symptom or condition that you feel may need urgent attention  Your doctor recommends these additional instructions:  If any biopsies were taken, your doctors clinic will contact you in 1 to 2   weeks with any results.  - Discharge patient to home (via wheelchair).   - Patient has a contact number available for emergencies.  The signs and   symptoms of potential delayed complications were discussed with the   patient.  Return to normal activities tomorrow.  Written discharge   instructions were provided to the patient.   - Resume previous diet.   - Continue present medications.   - Await pathology results.   - Repeat colonoscopy in 5 years for surveillance.  For questions, problems or results please call your physician - Yi Goncalves MD at Work:  [phone].  EMERGENCY PHONE NUMBER: [phone],  LAB RESULTS: [phone]  IF A COMPLICATION OR EMERGENCY SITUATION ARISES AND YOU ARE UNABLE TO REACH   YOUR PHYSICIAN - GO DIRECTLY TO THE EMERGENCY ROOM.  Yi Goncalves MD  4/19/2018 8:40:19 AM  This report has been verified and signed electronically.

## 2018-04-19 NOTE — ANESTHESIA PREPROCEDURE EVALUATION
04/19/2018  Gena Lui is a 62 y.o., female for Colonoscopy    Review of patient's allergies indicates:   Allergen Reactions    Versed [midazolam]      Intravenous versed causes altered mental status       Past Medical History:   Diagnosis Date    Anxiety and depression 3/21/2018    Benign essential hypertension 3/21/2018    Chronic bilateral low back pain without sciatica 3/21/2018    History of stroke 3/21/2018    Memory loss     Mixed hyperlipidemia 3/21/2018    Seizures      Past Surgical History:   Procedure Laterality Date    ANTERIOR VAGINAL REPAIR      CATARACT EXTRACTION Right 10/16/2007    Dr. Ramsey     CATARACT EXTRACTION W/  INTRAOCULAR LENS IMPLANT Left 10/30/2007    Dr. Ramsey    CHOLECYSTECTOMY      EYE SURGERY      TONSILLECTOMY       Patient Active Problem List   Diagnosis    Anxiety and depression    Benign essential hypertension    Mixed hyperlipidemia    History of stroke    Chronic bilateral low back pain without sciatica    Long-term use of aspirin therapy    Tobacco abuse    Suicidal thoughts    Seizures    Postmenopausal osteoporosis    Vitamin D deficiency    Screening for malignant neoplasm of colon     Wt Readings from Last 3 Encounters:   04/19/18 44 kg (97 lb)   03/21/18 44.3 kg (97 lb 10.6 oz)   02/02/18 47.6 kg (105 lb)     Temp Readings from Last 3 Encounters:   04/19/18 36.6 °C (97.9 °F) (Oral)   03/21/18 36.7 °C (98 °F)   02/02/18 36.4 °C (97.6 °F) (Oral)     BP Readings from Last 3 Encounters:   04/19/18 128/69   03/21/18 126/75   02/02/18 (!) 175/88     Pulse Readings from Last 3 Encounters:   04/19/18 71   03/21/18 83   02/02/18 83         Anesthesia Evaluation    I have reviewed the Patient Summary Reports.        Review of Systems      Physical Exam  General:  Well nourished    Airway/Jaw/Neck:  Airway Findings: Mouth Opening: Normal Tongue:  Normal  General Airway Assessment: Adult  Mallampati: II  Improves to II with phonation.  TM Distance: Normal, at least 6 cm       Chest/Lungs:  Chest/Lungs Findings: Clear to auscultation, Normal Respiratory Rate     Heart/Vascular:  Heart Findings: Rate: Normal  Rhythm: Regular Rhythm  Sounds: Normal        Mental Status:  Mental Status Findings:  Cooperative         Anesthesia Plan  Type of Anesthesia, risks & benefits discussed:  Anesthesia Type:  MAC  Patient's Preference:   Intra-op Monitoring Plan:   Intra-op Monitoring Plan Comments:   Post Op Pain Control Plan:   Post Op Pain Control Plan Comments:   Induction:   IV  Beta Blocker:         Informed Consent: Patient understands risks and agrees with Anesthesia plan.  Questions answered. Anesthesia consent signed with patient.  ASA Score: 2     Day of Surgery Review of History & Physical: I have interviewed and examined the patient. I have reviewed the patient's H&P dated:  There are no significant changes.  H&P update referred to the provider.  H&P completed by Anesthesiologist.   Anesthesia Plan Notes: H/O Petit mal seizures currently on medication.        Ready For Surgery From Anesthesia Perspective.

## 2018-04-19 NOTE — ANESTHESIA POSTPROCEDURE EVALUATION
"Anesthesia Post Evaluation    Patient: Gena Lui    Procedure(s) Performed: Procedure(s) (LRB):  COLONOSCOPY (N/A)    Final Anesthesia Type: MAC  Patient location during evaluation: GI PACU  Patient participation: Yes- Able to Participate  Level of consciousness: awake and alert and oriented  Post-procedure vital signs: reviewed and stable  Pain management: adequate  Airway patency: patent  PONV status at discharge: No PONV  Anesthetic complications: no      Cardiovascular status: blood pressure returned to baseline, hemodynamically stable and stable  Respiratory status: room air, unassisted and spontaneous ventilation  Hydration status: euvolemic  Follow-up not needed.        Visit Vitals  /66   Pulse 64   Temp 36.6 °C (97.9 °F) (Oral)   Resp 20   Ht 4' 11" (1.499 m)   Wt 44 kg (97 lb)   LMP  (LMP Unknown)   SpO2 98%   BMI 19.59 kg/m²       Pain/Nir Score: Presence of Pain: denies (4/19/2018  7:40 AM)      "

## 2018-04-19 NOTE — OR NURSING
Dr. Goncalves spoke with patient at bedside, copy of report given to patient along with thorough discharge instructions.  Patient verbalized understanding

## 2018-04-26 ENCOUNTER — TELEPHONE (OUTPATIENT)
Dept: GASTROENTEROLOGY | Facility: CLINIC | Age: 63
End: 2018-04-26

## 2018-04-26 NOTE — LETTER
April 26, 2018        Gena Farmerblanc  [address]  [place] [address]             [facility]  [address] [place] [address] Ave  [place] [address]  Phone: [phone] Dear Ms. Lui:    The final pathology report on the polyps removed during your recent colonoscopy did not show any cancerous growth. This type of polyp, if not removed, can potentially grow and become malignant. Fortunately, we identified and removed the polyp at a very early stage. I would recommend that you undergo a repeat colonsocopy in approximately five years.    In addition to the repeat tests indicated above, I suggest you adopt the following healthy habits to lower your risk of future polyps and colon cancer: exercise regularly, dont smoke, limit red meat in your diet, and include ample fruits and vegetables in your diet.    If you have any questions or concerns, please do not hesitate to call.    Sincerely,    Yi Goncalves MD

## 2018-04-26 NOTE — TELEPHONE ENCOUNTER
----- Message from Yi Goncalves MD sent at 4/26/2018  2:28 PM CDT -----  Tubular adenoma, 5 year colonoscopy recall

## 2018-05-01 ENCOUNTER — PROCEDURE VISIT (OUTPATIENT)
Dept: OPHTHALMOLOGY | Facility: CLINIC | Age: 63
End: 2018-05-01
Payer: MEDICARE

## 2018-05-01 DIAGNOSIS — H26.491 POSTERIOR CAPSULAR OPACIFICATION VISUALLY SIGNIFICANT, RIGHT EYE: ICD-10-CM

## 2018-05-01 DIAGNOSIS — H26.492 POSTERIOR CAPSULAR OPACIFICATION VISUALLY SIGNIFICANT, LEFT EYE: Primary | ICD-10-CM

## 2018-05-01 PROCEDURE — 92014 COMPRE OPH EXAM EST PT 1/>: CPT | Mod: 57,S$GLB,, | Performed by: OPHTHALMOLOGY

## 2018-05-01 PROCEDURE — 66821 AFTER CATARACT LASER SURGERY: CPT | Mod: 50,S$GLB,, | Performed by: OPHTHALMOLOGY

## 2018-05-01 RX ORDER — PREDNISOLONE ACETATE 10 MG/ML
1 SUSPENSION/ DROPS OPHTHALMIC 4 TIMES DAILY
Qty: 5 ML | Refills: 0 | Status: SHIPPED | OUTPATIENT
Start: 2018-05-01 | End: 2018-05-05

## 2018-05-01 NOTE — PROGRESS NOTES
HPI     Referred by Dr Briceno    10/16/2007 PCIOL OD Model SN60T4  25.0  2.25 cyl Dr. Ramsey   10/30/2007 PCIOL OS Model SN60T5  24.0  3.00 cyl Dr. Ramsey   No eyedrops    Pt referred by Dr Briceno for YAG Cap OS.  Pt states blurry VA OS.      Last edited by Maranda Small MA on 5/1/2018  1:57 PM. (History)              Assessment /Plan     For exam results, see Encounter Report.    Posterior capsular opacification visually significant, left eye  -     Yag Capsulotomy - OS - Left Eye    Posterior capsular opacification visually significant, right eye  -     Yag Capsulotomy - OD - Right Eye    Other orders  -     prednisoLONE acetate (PRED FORTE) 1 % DrpS; Place 1 drop into the left eye 4 (four) times daily. 1 drop left eye four times a day for four days then stop.  Dispense: 5 mL; Refill: 0      Visually significant posterior capsular opacity present.  OU  - discussed risks, benefits, and alternatives to laser surgery - pt wishes to proceed with yag laser  - Informed consent obtained and correct eye(s) verified with patient.  - Intraocular Pressure to be taken 10- 30 minutes post procedure.   - PF QID x 4 days then d/c  - f/up as scheduled    DIAGNOSIS: Visually significant posterior capsular opacity    PROCEDURE: YAG Laser Capsulotomy     COMPLICATIONS: none     DESCRIPTION OF PROCEDURE IN DETAIL:  1 drop of topical Proparacaine and Iopidine instilled, and eye(s) dilated with 1% Tropicamide 2.5% Phenylephrine. YAG laser applied to posterior capsule in cruciate pattern.      DISPOSITION:  Patient tolerated procedure well.        Pt has appt with dr. Briceno for updated mrx.

## 2018-05-04 ENCOUNTER — OFFICE VISIT (OUTPATIENT)
Dept: FAMILY MEDICINE | Facility: CLINIC | Age: 63
End: 2018-05-04
Payer: MEDICARE

## 2018-05-04 VITALS
BODY MASS INDEX: 19.01 KG/M2 | SYSTOLIC BLOOD PRESSURE: 132 MMHG | HEART RATE: 78 BPM | TEMPERATURE: 99 F | WEIGHT: 96.81 LBS | HEIGHT: 60 IN | DIASTOLIC BLOOD PRESSURE: 73 MMHG | OXYGEN SATURATION: 95 %

## 2018-05-04 DIAGNOSIS — I10 BENIGN ESSENTIAL HYPERTENSION: Primary | ICD-10-CM

## 2018-05-04 DIAGNOSIS — H91.93 BILATERAL HEARING LOSS, UNSPECIFIED HEARING LOSS TYPE: ICD-10-CM

## 2018-05-04 DIAGNOSIS — E55.9 VITAMIN D DEFICIENCY: ICD-10-CM

## 2018-05-04 DIAGNOSIS — M81.0 POSTMENOPAUSAL OSTEOPOROSIS: ICD-10-CM

## 2018-05-04 DIAGNOSIS — G89.29 CHRONIC BILATERAL LOW BACK PAIN WITHOUT SCIATICA: ICD-10-CM

## 2018-05-04 DIAGNOSIS — Z79.899 MEDICATION MANAGEMENT: ICD-10-CM

## 2018-05-04 DIAGNOSIS — F32.A ANXIETY AND DEPRESSION: ICD-10-CM

## 2018-05-04 DIAGNOSIS — E78.2 MIXED HYPERLIPIDEMIA: ICD-10-CM

## 2018-05-04 DIAGNOSIS — Z72.0 TOBACCO ABUSE: ICD-10-CM

## 2018-05-04 DIAGNOSIS — F41.9 ANXIETY AND DEPRESSION: ICD-10-CM

## 2018-05-04 DIAGNOSIS — Z79.82 LONG-TERM USE OF ASPIRIN THERAPY: ICD-10-CM

## 2018-05-04 DIAGNOSIS — M54.50 CHRONIC BILATERAL LOW BACK PAIN WITHOUT SCIATICA: ICD-10-CM

## 2018-05-04 PROBLEM — R45.851 SUICIDAL THOUGHTS: Status: RESOLVED | Noted: 2018-03-21 | Resolved: 2018-05-04

## 2018-05-04 PROCEDURE — 3008F BODY MASS INDEX DOCD: CPT | Mod: CPTII,S$GLB,, | Performed by: FAMILY MEDICINE

## 2018-05-04 PROCEDURE — 99999 PR PBB SHADOW E&M-EST. PATIENT-LVL IV: CPT | Mod: PBBFAC,,, | Performed by: FAMILY MEDICINE

## 2018-05-04 PROCEDURE — 3075F SYST BP GE 130 - 139MM HG: CPT | Mod: CPTII,S$GLB,, | Performed by: FAMILY MEDICINE

## 2018-05-04 PROCEDURE — 3078F DIAST BP <80 MM HG: CPT | Mod: CPTII,S$GLB,, | Performed by: FAMILY MEDICINE

## 2018-05-04 PROCEDURE — 99214 OFFICE O/P EST MOD 30 MIN: CPT | Mod: S$GLB,,, | Performed by: FAMILY MEDICINE

## 2018-05-04 NOTE — PROGRESS NOTES
" Office Visit    Patient Name: Gena Lui    : 1955  MRN: [id number]    Subjective:  Gena is a 62 y.o. female who presents today for:    Follow-up (6wk f/u)    63 yo female who established care with me 3/21/2018 at new patient physical here for 6 week follow up.     She was started on PROZAC daily for management of anxiety and depression. She is taking 40 mg daily with xanax as needed.  She reports feeling BETTER: NOT AS DOWN/ DEPRESSED AND HAVING FEWER PANIC ATTACKS-- 1/2 XANAX HELPS AS NEEDED.   Now that her mood is improved and her anxiety is more stable, she has actually cut out alcohol.  She has not been able to quit smoking, but she is very happy with stopping drinking.  She is "working on 1 bad habit at a time."    She was noted to have osteoporosis on DEXA and vitamin D deficiency.  She declined PROLIA due to needle aversion but she is not taking FOSAMAX or Calcium.  She is taking weekly vitamin D.     Blood pressure has been stable off of medications.  SHe was advised to restart lipitor after recent cholesterol panel showed LDL in the 153 TCHOL 247 and elevated triglycerides. She has also restarted Keppra and her daughter reports no further seizure like episodes.     Past Medical History  Past Medical History:   Diagnosis Date    Anxiety and depression 3/21/2018    Benign essential hypertension 3/21/2018    Chronic bilateral low back pain without sciatica 3/21/2018    History of stroke 3/21/2018    Memory loss     Mixed hyperlipidemia 3/21/2018    Seizures        Past Surgical History  Past Surgical History:   Procedure Laterality Date    ANTERIOR VAGINAL REPAIR      CATARACT EXTRACTION Right 10/16/2007    Dr. Ramsey     CATARACT EXTRACTION W/  INTRAOCULAR LENS IMPLANT Left 10/30/2007    Dr. Ramsey    CHOLECYSTECTOMY      COLONOSCOPY N/A 2018    Procedure: COLONOSCOPY;  Surgeon: Yi Goncalves MD;  Location: [facility];  Service: Endoscopy;  Laterality: N/A;    EYE SURGERY      " "TONSILLECTOMY         Family History  Family History   Problem Relation Age of Onset    Blindness Neg Hx     Glaucoma Neg Hx     Macular degeneration Neg Hx     Retinal detachment Neg Hx        Social History  Social History     Social History    Marital status:      Spouse name: N/A    Number of children: N/A    Years of education: N/A     Occupational History    Not on file.     Social History Main Topics    Smoking status: Current Every Day Smoker     Types: Cigarettes    Smokeless tobacco: Never Used      Comment: 1 PPD    Alcohol use Yes      Comment: 1-2 drinks daily    Drug use: Yes     Types: Marijuana      Comment: intermittent to take the edge off of her anxiety    Sexual activity: Not on file     Other Topics Concern    Not on file     Social History Narrative    No narrative on file       Current Medications  Medications reviewed and updated.     Allergies   Review of patient's allergies indicates:   Allergen Reactions    Versed [midazolam]      Intravenous versed causes altered mental status       Review of Systems (Pertinent positives)  Review of Systems   Constitutional: Negative for appetite change (eating regularly).   HENT: Positive for hearing loss.    Eyes: Positive for visual disturbance (improving with laser treatments ).   Psychiatric/Behavioral: Positive for dysphoric mood (IMPROVING). The patient is nervous/anxious (IMPROVED WITH PROZAC).        /73   Pulse 78   Temp 98.6 °F (37 °C)   Ht 4' 11.5" (1.511 m)   Wt 43.9 kg (96 lb 12.5 oz)   LMP  (LMP Unknown)   SpO2 95%   BMI 19.22 kg/m²     Physical Exam   Constitutional: She is oriented to person, place, and time. She appears well-developed and well-nourished. No distress.   HENT:   Head: Normocephalic and atraumatic.   Eyes: Conjunctivae are normal.   Cardiovascular: Normal rate and regular rhythm.    Pulmonary/Chest: Effort normal and breath sounds normal.   Musculoskeletal: She exhibits no edema. "   Neurological: She is alert and oriented to person, place, and time.   Skin: Skin is warm and dry.   Psychiatric: She has a normal mood and affect.   Patient is much more engaged and less anxious today   Vitals reviewed.        Assessment/Plan:  Gena Lui is a 62 y.o. female who presents today for :    Gena was seen today for follow-up.    Diagnoses and all orders for this visit:    Benign essential hypertension    Mixed hyperlipidemia  -     Comprehensive metabolic panel; Future  -     Lipid panel; Future    Postmenopausal osteoporosis  -     Vitamin D; Future    Tobacco abuse    Anxiety and depression    Long-term use of aspirin therapy    Chronic bilateral low back pain without sciatica    Medication management  -     Comprehensive metabolic panel; Future  -     Vitamin D; Future  -     Lipid panel; Future    Vitamin D deficiency  -     Vitamin D; Future    Bilateral hearing loss, unspecified hearing loss type  -     Ambulatory referral to ENT            ICD-10-CM ICD-9-CM    1. Benign essential hypertension I10 401.1    2. Mixed hyperlipidemia E78.2 272.2 Comprehensive metabolic panel      Lipid panel   3. Postmenopausal osteoporosis M81.0 733.01 Vitamin D   4. Tobacco abuse Z72.0 305.1    5. Anxiety and depression F41.9 300.00     F32.9 311    6. Long-term use of aspirin therapy Z79.82 V58.66    7. Chronic bilateral low back pain without sciatica M54.5 724.2     G89.29 338.29    8. Medication management Z79.899 V58.69 Comprehensive metabolic panel      Vitamin D      Lipid panel   9. Vitamin D deficiency E55.9 268.9 Vitamin D   10. Bilateral hearing loss, unspecified hearing loss type H91.93 389.9 Ambulatory referral to ENT       Patient Instructions   Add CITRACAL PLUS D VITAMIN SUPPLEMENT 2 PILLS WITH BREAKFAST, CONTINUE OTHER MEDS AS PRESCRIBED. GET MORE CONSISTENT WITH WEEKLY FOSAMAX. CONTINUE TO AVOID ALCOHOL AND CONSIDER SMOKING CESSATION.              Follow-up in about 6 months (around  11/4/2018) for to follow up on lab results, return as needed for new concerns.

## 2018-05-04 NOTE — PATIENT INSTRUCTIONS
Add CITRACAL PLUS D VITAMIN SUPPLEMENT 2 PILLS WITH BREAKFAST, CONTINUE OTHER MEDS AS PRESCRIBED. GET MORE CONSISTENT WITH WEEKLY FOSAMAX. CONTINUE TO AVOID ALCOHOL AND CONSIDER SMOKING CESSATION.

## 2018-05-18 ENCOUNTER — TELEPHONE (OUTPATIENT)
Dept: OPTOMETRY | Facility: CLINIC | Age: 63
End: 2018-05-18

## 2018-05-18 NOTE — TELEPHONE ENCOUNTER
Received notification from Dr. Ramsey's office. Patient last seen in 2007 and no records are available.

## 2018-06-05 ENCOUNTER — OFFICE VISIT (OUTPATIENT)
Dept: OPTOMETRY | Facility: CLINIC | Age: 63
End: 2018-06-05
Payer: MEDICARE

## 2018-06-05 DIAGNOSIS — H52.4 PRESBYOPIA: Primary | ICD-10-CM

## 2018-06-05 PROCEDURE — 99999 PR PBB SHADOW E&M-EST. PATIENT-LVL II: CPT | Mod: PBBFAC,,, | Performed by: OPTOMETRIST

## 2018-06-05 PROCEDURE — 99499 UNLISTED E&M SERVICE: CPT | Mod: S$GLB,,, | Performed by: OPTOMETRIST

## 2018-06-05 NOTE — PROGRESS NOTES
HPI     Concerns About Ocular Health    Additional comments: MR ck           Comments   Last eye exam was 5/1/18 with Dr. Bentley.  Patient states vision seems brighter since yag OU.   Patient denies diplopia, headaches, flashes/floaters, and pain.           Last edited by Alize Sneed on 6/5/2018  2:11 PM. (History)            Assessment /Plan     For exam results, see Encounter Report.    Presbyopia            1.  Bifocal rx given.    RTC 1 year for routine exam.

## 2018-10-01 ENCOUNTER — LAB VISIT (OUTPATIENT)
Dept: LAB | Facility: HOSPITAL | Age: 63
End: 2018-10-01
Attending: FAMILY MEDICINE
Payer: MEDICARE

## 2018-10-01 DIAGNOSIS — Z79.899 MEDICATION MANAGEMENT: ICD-10-CM

## 2018-10-01 DIAGNOSIS — E78.2 MIXED HYPERLIPIDEMIA: ICD-10-CM

## 2018-10-01 DIAGNOSIS — E55.9 VITAMIN D DEFICIENCY: ICD-10-CM

## 2018-10-01 DIAGNOSIS — M81.0 POSTMENOPAUSAL OSTEOPOROSIS: ICD-10-CM

## 2018-10-01 LAB
25(OH)D3+25(OH)D2 SERPL-MCNC: 38 NG/ML
ALBUMIN SERPL BCP-MCNC: 3.7 G/DL
ALP SERPL-CCNC: 99 U/L
ALT SERPL W/O P-5'-P-CCNC: 28 U/L
ANION GAP SERPL CALC-SCNC: 9 MMOL/L
AST SERPL-CCNC: 35 U/L
BILIRUB SERPL-MCNC: 0.6 MG/DL
BUN SERPL-MCNC: 12 MG/DL
CALCIUM SERPL-MCNC: 9.3 MG/DL
CHLORIDE SERPL-SCNC: 105 MMOL/L
CHOLEST SERPL-MCNC: 152 MG/DL
CHOLEST/HDLC SERPL: 3.5 {RATIO}
CO2 SERPL-SCNC: 26 MMOL/L
CREAT SERPL-MCNC: 0.8 MG/DL
EST. GFR  (AFRICAN AMERICAN): >60 ML/MIN/1.73 M^2
EST. GFR  (NON AFRICAN AMERICAN): >60 ML/MIN/1.73 M^2
GLUCOSE SERPL-MCNC: 94 MG/DL
HDLC SERPL-MCNC: 44 MG/DL
HDLC SERPL: 28.9 %
LDLC SERPL CALC-MCNC: 76.4 MG/DL
NONHDLC SERPL-MCNC: 108 MG/DL
POTASSIUM SERPL-SCNC: 4.3 MMOL/L
PROT SERPL-MCNC: 6.9 G/DL
SODIUM SERPL-SCNC: 140 MMOL/L
TRIGL SERPL-MCNC: 158 MG/DL

## 2018-10-01 PROCEDURE — 80061 LIPID PANEL: CPT

## 2018-10-01 PROCEDURE — 82306 VITAMIN D 25 HYDROXY: CPT

## 2018-10-01 PROCEDURE — 80053 COMPREHEN METABOLIC PANEL: CPT

## 2018-10-01 PROCEDURE — 36415 COLL VENOUS BLD VENIPUNCTURE: CPT

## 2018-10-05 ENCOUNTER — OFFICE VISIT (OUTPATIENT)
Dept: FAMILY MEDICINE | Facility: CLINIC | Age: 63
End: 2018-10-05
Payer: MEDICARE

## 2018-10-05 VITALS
HEIGHT: 60 IN | SYSTOLIC BLOOD PRESSURE: 146 MMHG | HEART RATE: 75 BPM | WEIGHT: 95.44 LBS | DIASTOLIC BLOOD PRESSURE: 81 MMHG | OXYGEN SATURATION: 95 % | BODY MASS INDEX: 18.74 KG/M2

## 2018-10-05 DIAGNOSIS — Z79.82 LONG-TERM USE OF ASPIRIN THERAPY: ICD-10-CM

## 2018-10-05 DIAGNOSIS — F32.A ANXIETY AND DEPRESSION: ICD-10-CM

## 2018-10-05 DIAGNOSIS — Z23 NEED FOR PROPHYLACTIC VACCINATION AGAINST STREPTOCOCCUS PNEUMONIAE (PNEUMOCOCCUS) AND INFLUENZA: ICD-10-CM

## 2018-10-05 DIAGNOSIS — Z86.73 HISTORY OF STROKE: ICD-10-CM

## 2018-10-05 DIAGNOSIS — G47.00 INSOMNIA, UNSPECIFIED TYPE: ICD-10-CM

## 2018-10-05 DIAGNOSIS — F10.11 HISTORY OF ALCOHOL ABUSE: ICD-10-CM

## 2018-10-05 DIAGNOSIS — Z87.898 HISTORY OF SEIZURES: ICD-10-CM

## 2018-10-05 DIAGNOSIS — Z79.899 MEDICATION MANAGEMENT: ICD-10-CM

## 2018-10-05 DIAGNOSIS — M81.0 POSTMENOPAUSAL OSTEOPOROSIS: ICD-10-CM

## 2018-10-05 DIAGNOSIS — F41.9 ANXIETY AND DEPRESSION: ICD-10-CM

## 2018-10-05 DIAGNOSIS — E55.9 VITAMIN D DEFICIENCY: ICD-10-CM

## 2018-10-05 DIAGNOSIS — I10 BENIGN ESSENTIAL HYPERTENSION: Primary | ICD-10-CM

## 2018-10-05 DIAGNOSIS — E78.2 MIXED HYPERLIPIDEMIA: ICD-10-CM

## 2018-10-05 DIAGNOSIS — Z72.0 TOBACCO ABUSE: ICD-10-CM

## 2018-10-05 PROCEDURE — 90732 PPSV23 VACC 2 YRS+ SUBQ/IM: CPT | Mod: PBBFAC,PO

## 2018-10-05 PROCEDURE — 90686 IIV4 VACC NO PRSV 0.5 ML IM: CPT | Mod: PBBFAC,PO

## 2018-10-05 PROCEDURE — 99999 PR PBB SHADOW E&M-EST. PATIENT-LVL IV: CPT | Mod: PBBFAC,,, | Performed by: FAMILY MEDICINE

## 2018-10-05 PROCEDURE — 3008F BODY MASS INDEX DOCD: CPT | Mod: CPTII,,, | Performed by: FAMILY MEDICINE

## 2018-10-05 PROCEDURE — 3079F DIAST BP 80-89 MM HG: CPT | Mod: CPTII,,, | Performed by: FAMILY MEDICINE

## 2018-10-05 PROCEDURE — 3077F SYST BP >= 140 MM HG: CPT | Mod: CPTII,,, | Performed by: FAMILY MEDICINE

## 2018-10-05 PROCEDURE — 99214 OFFICE O/P EST MOD 30 MIN: CPT | Mod: PBBFAC,PO | Performed by: FAMILY MEDICINE

## 2018-10-05 PROCEDURE — 99214 OFFICE O/P EST MOD 30 MIN: CPT | Mod: S$PBB,,, | Performed by: FAMILY MEDICINE

## 2018-10-05 RX ORDER — ERGOCALCIFEROL 1.25 MG/1
50000 CAPSULE ORAL
Qty: 15 CAPSULE | Refills: 4 | Status: SHIPPED | OUTPATIENT
Start: 2018-10-05 | End: 2019-01-31

## 2018-10-05 RX ORDER — MIRTAZAPINE 15 MG/1
TABLET, FILM COATED ORAL
Qty: 30 TABLET | Refills: 11 | Status: SHIPPED | OUTPATIENT
Start: 2018-10-05 | End: 2019-09-06

## 2018-10-05 RX ORDER — ALPRAZOLAM 0.5 MG/1
0.5 TABLET ORAL DAILY PRN
Qty: 30 TABLET | Refills: 2 | Status: ON HOLD | OUTPATIENT
Start: 2018-10-05 | End: 2019-02-08 | Stop reason: HOSPADM

## 2018-10-05 NOTE — PROGRESS NOTES
Office Visit    Patient Name: Gena Lui    : 1955  MRN: [id number]    Subjective:  Gena is a 62 y.o. female who presents today for:    Follow-up (5mo f/u, flu shot)    61 yo female who established care with me 3/21/2018 at new patient physical here for 6 month follow up of visit from 2018.      She was started on PROZAC daily for management of anxiety and depression. She is taking 40 mg daily with xanax as needed.  She reports feeling BETTER: NOT AS DOWN/ DEPRESSED AND HAVING FEWER PANIC ATTACKS-- 1/2 XANAX HELPS AS NEEDED.   Now that her mood is improved and her anxiety is more stable, she has actually cut out alcohol-- only a glass of red wine with dinner.  She has not been able to quit smoking, but she is very happy with stopping drinking.  She does report continued trouble with sleep.  She feels that her sleep is very interrupted and is not overly helped by over-the-counter melatonin.  She feels that anxiety definitely has a negative impact on her sleep.  Additional concerns related to anxiety and depression include continued decreased appetite.  Her weight is generally stable but her body mass index is in the 18 range.  When she does feel down, she feels that she can go for days without eating.      She was noted to have osteoporosis on DEXA and vitamin D deficiency.  She declined PROLIA due to needle aversion but she has has trouble with consistently taking FOSAMAX or Calcium. She is taking weekly vitamin D and level improved from 26-->38. She actually reports that she cannot tolerate the fosamax due to GI side effects      Blood pressure has been stable off of medications.  She was advised to restart lipitor 20 after recent cholesterol panel showed LDL in the 153 TCHOL 247 and elevated triglycerides on 3/21/2018 and recent levels on lipid panel 10/1/2018 show improvement to LDL 76, TRIG 158, TCHOL 152.     She has also restarted Keppra and had no further seizure like episodes.     Past  Medical History  Past Medical History:   Diagnosis Date    Anxiety and depression 3/21/2018    Benign essential hypertension 3/21/2018    Chronic bilateral low back pain without sciatica 3/21/2018    History of stroke 3/21/2018    Memory loss     Mixed hyperlipidemia 3/21/2018    Seizures        Past Surgical History  Past Surgical History:   Procedure Laterality Date    ANTERIOR VAGINAL REPAIR      CATARACT EXTRACTION Right 10/16/2007    Dr. Ramsey //yag 5/1/18 Dr. Bentley    CATARACT EXTRACTION W/  INTRAOCULAR LENS IMPLANT Left 10/30/2007    Dr. Ramsey// yag 5/1/18 Dr. Bentley    CHOLECYSTECTOMY      COLONOSCOPY N/A 4/19/2018    Procedure: COLONOSCOPY;  Surgeon: Yi Goncalves MD;  Location: [facility] ENDO;  Service: Endoscopy;  Laterality: N/A;    COLONOSCOPY N/A 4/19/2018    Performed by Yi Goncalves MD at [facility]    EYE SURGERY      TONSILLECTOMY         Family History  Family History   Problem Relation Age of Onset    Blindness Neg Hx     Glaucoma Neg Hx     Macular degeneration Neg Hx     Retinal detachment Neg Hx        Social History  Social History     Socioeconomic History    Marital status:      Spouse name: Not on file    Number of children: Not on file    Years of education: Not on file    Highest education level: Not on file   Social Needs    Financial resource strain: Not on file    Food insecurity - worry: Not on file    Food insecurity - inability: Not on file    Transportation needs - medical: Not on file    Transportation needs - non-medical: Not on file   Occupational History    Not on file   Tobacco Use    Smoking status: Current Every Day Smoker     Types: Cigarettes    Smokeless tobacco: Never Used    Tobacco comment: 1 PPD   Substance and Sexual Activity    Alcohol use: Yes     Comment: 1-2 drinks daily    Drug use: Yes     Types: Marijuana     Comment: intermittent to take the edge off of her anxiety    Sexual activity: Not on file   Other Topics  "Concern    Not on file   Social History Narrative    Not on file       Current Medications  Medications reviewed and updated.     Allergies   Review of patient's allergies indicates:   Allergen Reactions    Versed [midazolam]      Intravenous versed causes altered mental status       Review of Systems (Pertinent positives)  Review of Systems   Constitutional: Negative for unexpected weight change.   Respiratory: Positive for shortness of breath (stable).    Cardiovascular: Negative for leg swelling.   Gastrointestinal: Positive for diarrhea. Negative for blood in stool.   Genitourinary: Negative for dysuria and hematuria.   Neurological: Positive for light-headedness. Negative for dizziness and syncope. Numbness: stable.   Psychiatric/Behavioral: Positive for dysphoric mood (stable on Prozac) and sleep disturbance. The patient is nervous/anxious (stable on prozac and with xanax as needed).        BP (!) 146/81   Pulse 75   Ht 4' 11.5" (1.511 m)   Wt 43.3 kg (95 lb 7.4 oz)   LMP  (LMP Unknown)   SpO2 95%   BMI 18.96 kg/m²     Physical Exam   Constitutional: She is oriented to person, place, and time. She appears well-developed. No distress.   HENT:   Head: Normocephalic and atraumatic.   Eyes: Conjunctivae are normal.   Cardiovascular: Normal rate and regular rhythm.   Pulmonary/Chest: Effort normal and breath sounds normal.   Musculoskeletal: She exhibits no edema.   Neurological: She is alert and oriented to person, place, and time.   Skin: Skin is warm and dry.   Psychiatric: She has a normal mood and affect.   Vitals reviewed.        Assessment/Plan:  Gena Lui is a 62 y.o. female who presents today for :    Gena was seen today for follow-up.    Diagnoses and all orders for this visit:    Benign essential hypertension  Comments:  Blood pressure has been stable off of medications.  Mildly elevated today.  Will keep a home log and return for nurse visit in 2 weeks.  Consider starting ACEi "   Orders:  -     Comprehensive metabolic panel; Future  -     Lipid panel; Future  -     CBC auto differential; Future  -     TSH; Future    History of stroke  Comments:  stable on aspirin, statin, focusing on BP control    Long-term use of aspirin therapy    Mixed hyperlipidemia  Comments:  continue lipitor, improvementin lipid profile  Orders:  -     Hemoglobin A1c; Future  -     Comprehensive metabolic panel; Future  -     Lipid panel; Future    Postmenopausal osteoporosis  Comments:  intolerant of Fosamax and not ready to try prolia due to injection fear.  continue vitamin D, WB activities and recheck DEXA 3/2020  Orders:  -     TSH; Future  -     Vitamin D; Future  -     ergocalciferol (ERGOCALCIFEROL) 50,000 unit Cap; Take 1 capsule (50,000 Units total) by mouth every 7 days.    Vitamin D deficiency  -     Vitamin D; Future  -     ergocalciferol (ERGOCALCIFEROL) 50,000 unit Cap; Take 1 capsule (50,000 Units total) by mouth every 7 days.    Anxiety and depression  -     ALPRAZolam (XANAX) 0.5 MG tablet; Take 1 tablet (0.5 mg total) by mouth daily as needed for Insomnia or Anxiety (panic attack).    Insomnia, unspecified type  Comments:  trial of adding remeron  Orders:  -     mirtazapine (REMERON) 15 MG tablet; 1/2-1 tab nightly for treatment of insomnia and to increase appetite    History of seizures    Medication management  -     Hemoglobin A1c; Future  -     Comprehensive metabolic panel; Future  -     Lipid panel; Future  -     CBC auto differential; Future  -     TSH; Future  -     Vitamin D; Future    Tobacco abuse  -     Influenza - Quadrivalent (3 years & older) (PF)  -     (In Office Administered) Pneumococcal Polysaccharide Vaccine (23 Valent) (SQ/IM)    Need for prophylactic vaccination against Streptococcus pneumoniae (pneumococcus) and influenza  -     Influenza - Quadrivalent (3 years & older) (PF)  -     (In Office Administered) Pneumococcal Polysaccharide Vaccine (23 Valent) (SQ/IM)    History  of alcohol abuse    BMI less than 19,adult  -     mirtazapine (REMERON) 15 MG tablet; 1/2-1 tab nightly for treatment of insomnia and to increase appetite            ICD-10-CM ICD-9-CM    1. Benign essential hypertension I10 401.1 Comprehensive metabolic panel      Lipid panel      CBC auto differential      TSH    Blood pressure has been stable off of medications.  Mildly elevated today.  Will keep a home log and return for nurse visit in 2 weeks.  Consider starting ACEi    2. History of stroke Z86.73 V12.54     stable on aspirin, statin, focusing on BP control   3. Long-term use of aspirin therapy Z79.82 V58.66    4. Mixed hyperlipidemia E78.2 272.2 Hemoglobin A1c      Comprehensive metabolic panel      Lipid panel    continue lipitor, improvementin lipid profile   5. Postmenopausal osteoporosis M81.0 733.01 TSH      Vitamin D      ergocalciferol (ERGOCALCIFEROL) 50,000 unit Cap    intolerant of Fosamax and not ready to try prolia due to injection fear.  continue vitamin D, WB activities and recheck DEXA 3/2020   6. Vitamin D deficiency E55.9 268.9 Vitamin D      ergocalciferol (ERGOCALCIFEROL) 50,000 unit Cap   7. Anxiety and depression F41.9 300.00 ALPRAZolam (XANAX) 0.5 MG tablet    F32.9 311    8. Insomnia, unspecified type G47.00 780.52 mirtazapine (REMERON) 15 MG tablet    trial of adding remeron   9. History of seizures Z87.898 V12.49    10. Medication management Z79.899 V58.69 Hemoglobin A1c      Comprehensive metabolic panel      Lipid panel      CBC auto differential      TSH      Vitamin D   11. Tobacco abuse Z72.0 305.1 Influenza - Quadrivalent (3 years & older) (PF)      (In Office Administered) Pneumococcal Polysaccharide Vaccine (23 Valent) (SQ/IM)   12. Need for prophylactic vaccination against Streptococcus pneumoniae (pneumococcus) and influenza Z23 V06.6 Influenza - Quadrivalent (3 years & older) (PF)      (In Office Administered) Pneumococcal Polysaccharide Vaccine (23 Valent) (SQ/IM)   13.  History of alcohol abuse Z87.898 305.03    14. BMI less than 19,adult Z68.1 V85.0 mirtazapine (REMERON) 15 MG tablet       Patient Instructions   KEEP TRACK OF HOME BLOOD PRESSURES-- RECORD MORNING AND EVENING PRESSURES AND HEART RATES, AND BRING LOG TO NURSE VISIT IN 2-3 WEEKS.            Follow-up in about 6 months (around 4/5/2019) for annual exam in 6 months with labs prior, nurse visit in 2 weeks for BP check.

## 2018-10-05 NOTE — PATIENT INSTRUCTIONS
KEEP TRACK OF HOME BLOOD PRESSURES-- RECORD MORNING AND EVENING PRESSURES AND HEART RATES, AND BRING LOG TO NURSE VISIT IN 2-3 WEEKS.

## 2018-10-18 ENCOUNTER — TELEPHONE (OUTPATIENT)
Dept: FAMILY MEDICINE | Facility: CLINIC | Age: 63
End: 2018-10-18

## 2018-10-18 ENCOUNTER — CLINICAL SUPPORT (OUTPATIENT)
Dept: FAMILY MEDICINE | Facility: CLINIC | Age: 63
End: 2018-10-18
Payer: MEDICARE

## 2018-10-18 VITALS — SYSTOLIC BLOOD PRESSURE: 174 MMHG | DIASTOLIC BLOOD PRESSURE: 84 MMHG

## 2018-10-18 DIAGNOSIS — Z86.73 HISTORY OF STROKE: ICD-10-CM

## 2018-10-18 DIAGNOSIS — I10 BENIGN ESSENTIAL HYPERTENSION: Primary | ICD-10-CM

## 2018-10-18 RX ORDER — LISINOPRIL 5 MG/1
5 TABLET ORAL DAILY
Qty: 90 TABLET | Refills: 3 | Status: SHIPPED | OUTPATIENT
Start: 2018-10-18 | End: 2018-11-08 | Stop reason: DRUGHIGH

## 2018-10-18 NOTE — TELEPHONE ENCOUNTER
Called patient to advise would like her to start a very small dose of the blood pressure medication lisinopril (5 mg). She should take the medication in the evenings. This will hopefully help prevent some of those higher spikes she is getting. Please advise that she  the new medication from [facility], check and record blood pressures for another 2-3 weeks and return again for nurse visit in about 3 weeks.  Hopefully this little dose will do the trick without dropping her too low... She should let us know if the top number is dropping less than 90 on the medication.  Patient verbalized understanding.

## 2018-10-18 NOTE — TELEPHONE ENCOUNTER
Patient came in for blood pressure check this morning.  She is not taking any BP meds.  Patient provided list of readings as listed below:    Morning  Afternoon  10/09/18:  140/94   107/72     10/10/18:  140/101  112/77    10/11/18:  151/103  123/74    10/12/18:  197/113  112/77    10/13/18:  142/103  123/81    10/14/18:  142/92   136/95    10/15/18:  153/91       10/16/18:  148/113    10/17/18:  150/95   133/79    Please advise.

## 2018-10-18 NOTE — TELEPHONE ENCOUNTER
Would like her to start a very small dose of the blood pressure medication lisinopril (5 mg). She should take the medication in the evenings. This will hopefully help prevent some of those higher spikes she is getting. Please advise that she  the new medication from [facility], check and record blood pressures for another 2-3 weeks and return again for nurse visit in about 3 weeks.  Hopefully this little dose will do the trick without dropping her too low... She should let us know if the top number is dropping less than 90 on the medication thanks

## 2018-11-08 ENCOUNTER — TELEPHONE (OUTPATIENT)
Dept: FAMILY MEDICINE | Facility: CLINIC | Age: 63
End: 2018-11-08

## 2018-11-08 ENCOUNTER — CLINICAL SUPPORT (OUTPATIENT)
Dept: FAMILY MEDICINE | Facility: CLINIC | Age: 63
End: 2018-11-08
Payer: MEDICARE

## 2018-11-08 VITALS — DIASTOLIC BLOOD PRESSURE: 84 MMHG | SYSTOLIC BLOOD PRESSURE: 150 MMHG

## 2018-11-08 DIAGNOSIS — I10 BENIGN ESSENTIAL HYPERTENSION: Primary | ICD-10-CM

## 2018-11-08 PROCEDURE — 99999 PR PBB SHADOW E&M-EST. PATIENT-LVL II: CPT | Mod: PBBFAC,,,

## 2018-11-08 RX ORDER — LISINOPRIL 10 MG/1
10 TABLET ORAL DAILY
Qty: 90 TABLET | Refills: 3 | Status: SHIPPED | OUTPATIENT
Start: 2018-11-08 | End: 2019-11-17 | Stop reason: SDUPTHER

## 2018-11-08 NOTE — TELEPHONE ENCOUNTER
I would like her to increase her lisinopril from 5mg up to 10 mg daily. I have sent her in a new prescription to the [facility] but she can use the remainder of her current prescription by taking 2 5 mg pills at a time. Please have her continue home monitoring and return for another nurse visit in about 3 weeks thanks!

## 2018-11-08 NOTE — TELEPHONE ENCOUNTER
Pt came in today for a nurse visit to have her BP checked. Please review nurse visit and let me know if you have any recommendations.

## 2018-11-09 ENCOUNTER — TELEPHONE (OUTPATIENT)
Dept: FAMILY MEDICINE | Facility: CLINIC | Age: 63
End: 2018-11-09

## 2018-11-09 NOTE — TELEPHONE ENCOUNTER
Called pt to review recommendations: increase her lisinopril from 5mg up to 10 mg daily. I have sent her in a new prescription to the [facility] but she can use the remainder of her current prescription by taking 2 5 mg pills at a time. Please have her continue home monitoring and return for another nurse visit in about 3 weeks.-- left a message requesting a call back.

## 2018-12-26 ENCOUNTER — TELEPHONE (OUTPATIENT)
Dept: FAMILY MEDICINE | Facility: CLINIC | Age: 63
End: 2018-12-26

## 2018-12-26 DIAGNOSIS — R56.9 SEIZURES: ICD-10-CM

## 2018-12-26 DIAGNOSIS — Z87.898 HISTORY OF SEIZURES: Primary | ICD-10-CM

## 2018-12-26 RX ORDER — LEVETIRACETAM 500 MG/1
500 TABLET ORAL 2 TIMES DAILY
Qty: 180 TABLET | Refills: 3 | Status: SHIPPED | OUTPATIENT
Start: 2018-12-26 | End: 2020-05-04

## 2018-12-26 NOTE — TELEPHONE ENCOUNTER
----- Message from Mila Benjamin sent at 12/26/2018 10:32 AM CST -----  Contact: [phone]/[facility] pharmacy   Pharmacy called stating rx levETIRAcetam (KEPPRA) 500 MG Tab it on back order by the manufactory and they want to know if the pt can get a similar prescription sent to the pharmacy . Please advise

## 2018-12-26 NOTE — TELEPHONE ENCOUNTER
With something like Keppra that is prescribed for a history of seizures, I would rather her find a way to stay on this exact medication instead of calling in a different seizure medication as many of these have side effects and patients can respond quite differently.  I have sent her Keppra prescription to the pharmacy here in [facility].  I would like for her to temporarily obtain her prescription here or at a different pharmacy that has it.  She should let me know if there are any concerns or difficulties.  Thank you

## 2018-12-26 NOTE — TELEPHONE ENCOUNTER
----- Message from Miri Rider sent at 12/26/2018  2:10 PM CST -----  Contact: Self [phone]  Patient is returning your call.  Please advise.

## 2019-01-27 ENCOUNTER — HOSPITAL ENCOUNTER (EMERGENCY)
Facility: HOSPITAL | Age: 64
Discharge: HOME | End: 2019-01-27
Attending: EMERGENCY MEDICINE
Payer: MEDICARE

## 2019-01-27 VITALS
TEMPERATURE: 99 F | RESPIRATION RATE: 18 BRPM | DIASTOLIC BLOOD PRESSURE: 67 MMHG | BODY MASS INDEX: 21.17 KG/M2 | HEART RATE: 103 BPM | OXYGEN SATURATION: 98 % | WEIGHT: 105 LBS | SYSTOLIC BLOOD PRESSURE: 130 MMHG | HEIGHT: 59 IN

## 2019-01-27 DIAGNOSIS — K46.9 ABDOMINAL HERNIA WITHOUT OBSTRUCTION AND WITHOUT GANGRENE, RECURRENCE NOT SPECIFIED, UNSPECIFIED HERNIA TYPE: ICD-10-CM

## 2019-01-27 DIAGNOSIS — R10.9 ABDOMINAL PAIN, UNSPECIFIED ABDOMINAL LOCATION: Primary | ICD-10-CM

## 2019-01-27 LAB
BACTERIA #/AREA URNS AUTO: NORMAL /HPF
BILIRUB UR QL STRIP: NEGATIVE
BUN SERPL-MCNC: 6 MG/DL (ref 6–30)
CHLORIDE SERPL-SCNC: 102 MMOL/L (ref 95–110)
CLARITY UR REFRACT.AUTO: CLEAR
COLOR UR AUTO: ABNORMAL
CREAT SERPL-MCNC: 0.8 MG/DL (ref 0.5–1.4)
GLUCOSE SERPL-MCNC: 91 MG/DL (ref 70–110)
GLUCOSE UR QL STRIP: NEGATIVE
HCT VFR BLD CALC: 40 %PCV (ref 36–54)
HGB UR QL STRIP: ABNORMAL
KETONES UR QL STRIP: NEGATIVE
LEUKOCYTE ESTERASE UR QL STRIP: NEGATIVE
MICROSCOPIC COMMENT: NORMAL
NITRITE UR QL STRIP: NEGATIVE
PH UR STRIP: 6 [PH] (ref 5–8)
POC IONIZED CALCIUM: 1.2 MMOL/L (ref 1.06–1.42)
POC TCO2 (MEASURED): 26 MMOL/L (ref 23–29)
POTASSIUM BLD-SCNC: 4.1 MMOL/L (ref 3.5–5.1)
PROT UR QL STRIP: NEGATIVE
RBC #/AREA URNS AUTO: 1 /HPF (ref 0–4)
SAMPLE: NORMAL
SODIUM BLD-SCNC: 140 MMOL/L (ref 136–145)
SP GR UR STRIP: 1 (ref 1–1.03)
URN SPEC COLLECT METH UR: ABNORMAL
WBC #/AREA URNS AUTO: 0 /HPF (ref 0–5)

## 2019-01-27 PROCEDURE — 81001 URINALYSIS AUTO W/SCOPE: CPT

## 2019-01-27 PROCEDURE — 99285 EMERGENCY DEPT VISIT HI MDM: CPT

## 2019-01-27 PROCEDURE — 99283 PR EMERGENCY DEPT VISIT,LEVEL III: ICD-10-PCS | Mod: ,,, | Performed by: NURSE PRACTITIONER

## 2019-01-27 PROCEDURE — 25500020 PHARM REV CODE 255: Performed by: EMERGENCY MEDICINE

## 2019-01-27 PROCEDURE — 99283 EMERGENCY DEPT VISIT LOW MDM: CPT | Mod: ,,, | Performed by: NURSE PRACTITIONER

## 2019-01-27 RX ADMIN — IOHEXOL 75 ML: 350 INJECTION, SOLUTION INTRAVENOUS at 04:01

## 2019-01-27 NOTE — ED PROVIDER NOTES
"Encounter Date: 1/27/2019       History     Chief Complaint   Patient presents with    Abdominal Pain     abd pain onset Friday, abd distention, states " I have been having a hernia for 10 years, but have never pain before."      Patient is a 63-year-old female with medical history of anxiety, depression, hypertension, low back pain, hernia presenting to the ED for increased abdominal pain and distension.  Patient states pain and distention started on Friday.  Patient states she has had a hernia for the past 10 years with no complications.  Patient states she is able to tolerate p.o..  Patient denies any fevers but does endorse intermittent chills. Patient denies any nausea, vomiting or constipation.          Review of patient's allergies indicates:   Allergen Reactions    Versed [midazolam]      Intravenous versed causes altered mental status     Past Medical History:   Diagnosis Date    Anxiety and depression 3/21/2018    Benign essential hypertension 3/21/2018    Chronic bilateral low back pain without sciatica 3/21/2018    History of stroke 3/21/2018    Memory loss     Mixed hyperlipidemia 3/21/2018    Seizures      Past Surgical History:   Procedure Laterality Date    ANTERIOR VAGINAL REPAIR      CATARACT EXTRACTION Right 10/16/2007    Dr. Ramsey //yag 5/1/18 Dr. Bentley    CATARACT EXTRACTION W/  INTRAOCULAR LENS IMPLANT Left 10/30/2007    Dr. Ramsey// yag 5/1/18 Dr. Bentley    CHOLECYSTECTOMY      COLONOSCOPY N/A 4/19/2018    Performed by [facility]cal[facility]    EYE SURGERY      TONSILLECTOMY       Family History   Problem Relation Age of Onset    Blindness Neg Hx     Glaucoma Neg Hx     Macular degeneration Neg Hx     Retinal detachment Neg Hx      Social History     Tobacco Use    Smoking status: Current Every Day Smoker     Types: Cigarettes    Smokeless tobacco: Never Used    Tobacco comment: 1 PPD   Substance Use Topics    Alcohol use: Yes     Comment: 1-2 drinks daily "    Drug use: Yes     Types: Marijuana     Comment: intermittent to take the edge off of her anxiety     Review of Systems   Constitutional: Negative for activity change, appetite change, chills, fatigue and fever.   HENT: Negative for congestion and sore throat.    Respiratory: Negative for cough, chest tightness, shortness of breath and wheezing.    Cardiovascular: Negative for chest pain and palpitations.   Gastrointestinal: Positive for abdominal distention and abdominal pain ( LUQ). Negative for constipation, diarrhea, nausea and vomiting.   Genitourinary: Negative for decreased urine volume, difficulty urinating, dysuria, flank pain, hematuria and urgency.   Musculoskeletal: Negative for arthralgias, back pain, joint swelling and myalgias.   Skin: Negative for color change, pallor, rash and wound.   Neurological: Negative for dizziness, syncope, weakness, numbness and headaches.       Physical Exam     Initial Vitals [01/27/19 1306]   BP Pulse Resp Temp SpO2   130/67 103 18 98.5 °F (36.9 °C) 98 %      MAP       --         Physical Exam    Nursing note and vitals reviewed.  Constitutional: Vital signs are normal. She appears well-developed and well-nourished. She is cooperative. She does not have a sickly appearance. She does not appear ill.   HENT:   Head: Normocephalic and atraumatic.   Mouth/Throat: Uvula is midline, oropharynx is clear and moist and mucous membranes are normal.   Cardiovascular: Normal rate and regular rhythm.   Pulses:       Radial pulses are 2+ on the right side, and 2+ on the left side.        Dorsalis pedis pulses are 2+ on the right side, and 2+ on the left side.   Pulmonary/Chest: Effort normal and breath sounds normal.   Abdominal: Soft. Normal appearance and bowel sounds are normal. She exhibits distension. There is tenderness in the left upper quadrant. There is guarding. There is no rigidity. A hernia is present.   Musculoskeletal: Normal range of motion.   Neurological: She is  alert and oriented to person, place, and time. She has normal strength. No sensory deficit. GCS eye subscore is 4. GCS verbal subscore is 5. GCS motor subscore is 6.   Skin: Skin is warm, dry and intact. Capillary refill takes less than 2 seconds. No abrasion and no rash noted. No cyanosis. Nails show no clubbing.         ED Course   Procedures  Labs Reviewed   URINALYSIS, REFLEX TO URINE CULTURE - Abnormal; Notable for the following components:       Result Value    Specific Gravity, UA 1.000 (*)     Occult Blood UA 1+ (*)     All other components within normal limits    Narrative:     Preferred Collection Type->Urine, Clean Catch   URINALYSIS MICROSCOPIC    Narrative:     Preferred Collection Type->Urine, Clean Catch   ISTAT PROCEDURE   ISTAT CHEM8          Imaging Results          CT Abdomen Pelvis With Contrast (Final result)  Result time 01/27/19 16:11:32    Final result by Tree Hopkins MD (01/27/19 16:11:32)                 Impression:      1. Left periumbilical fat and fluid containing hernia, noting there may be some induration in the region, clinical correlation with any focal tenderness recommended.  There is a 2nd cranial more fluid containing anterior abdominal wall hernia, without induration.  2. Liquid stool within the colon, findings are nonspecific however may reflect diarrheal illness, correlation recommended.  3. Fluid in the endometrium, clinical correlation is advised, further evaluation on a nonemergent basis with ultrasound is recommended.  4. Findings may reflect hepatic steatosis, correlation with LFTs advised.  5. Several additional findings above.      Electronically signed by: Tree Hopkins MD  Date:    01/27/2019  Time:    16:11             Narrative:    EXAMINATION:  CT ABDOMEN PELVIS WITH CONTRAST    CLINICAL HISTORY:  Hernia, complicated;    TECHNIQUE:  Low dose axial images, sagittal and coronal reformations were obtained from the lung bases to the pubic symphysis following  the IV administration of 75 mL of Omnipaque 350 .  Oral contrast was not given.    COMPARISON:  None.    FINDINGS:  Images of the lower thorax are remarkable for bilateral dependent atelectasis/scarring.    The liver is minimally hypoattenuating, correlation with LFTs recommended, this could reflect steatosis.  There are 2 punctate hypoattenuating foci within the right hepatic lobe, too small for characterization.  The spleen, pancreas and adrenal glands are unremarkable.  The gallbladder is surgically absent.  There is mild central biliary duct prominence within the liver, the common duct is not significantly enlarged.  The pancreatic duct is not dilated.  The portal vein, splenic vein, SMV, celiac axis and SMA all are patent.  No significant abdominal lymphadenopathy.    The kidneys enhance symmetrically without hydronephrosis or nephrolithiasis.  Subcentimeter hypoattenuating lesions are noted within the interpolar region of the right kidney, too small for characterization.  The kidneys excrete contrast appropriately on delayed imaging.  The bilateral ureters are unremarkable without calculi seen.  The urinary bladder is distended without wall thickening.  There is fluid within the endometrium, clinical correlation is advised.  There are prominent vessels within the adnexa bilaterally, correlation with any history of pelvic congestion syndrome.    There is some liquid stool within the rectum.  There are additional regions of scattered liquid stool within the large bowel, correlation with any history of diarrheal illness.  The terminal ileum is unremarkable.  The appendix is unremarkable.  The small bowel is grossly unremarkable.  No focal organized pelvic fluid collection.    Degenerative changes are noted of the spine.  There is superior anterior compression deformity of L2, similar in configuration to radiograph 02/02/2018.  No significant inguinal lymphadenopathy.    There is an anterior abdominal left  periumbilical fat and mesentery containing hernia, noting the hernia contains a small amount of fluid.  Cranially, there is a smaller fluid containing anterior abdominal wall hernia, just anterior to the right hepatic lobe.  No surrounding induration in the region.                                       APC / Resident Notes:   Emergent evaluation of a 62 yo female patient presenting to the ER with chief complaint of LUQ abdominal pain at the site of her hernia. Pt states pain started on Friday.  On exam, pt A&O x3.  Breath sounds clear bilaterally. Abdomen is soft with tenderness noted in left upper quadrant.  Hernia noted.  Bowel sounds within normal limits.  Cap refill less than 3 sec.  Strength 5/5 in all extremities.  I will get imaging and reassess.  Differential diagnoses include but are not limited to hernia, diverticulitis, gastroenteritis, colitis, irritable bowel syndrome, urinary tract infection.  I discussed the care of this patient with my Supervising Physician.      Patient is not tachycardic.  No peritoneal signs noted. Patient is not febrile.  CT shows left periumbilical fat and fluid containing hernia.  Patient states her knee has been present for the past 10 years.  Advised patient we will place a referral for General surgery to follow up as an outpatient.  Patient verbalized understanding of this plan of care.  Patient's UA negative for any acute infectious process.  I-STAT within normal limits. Patient verbalized understanding of plan of care.    Patient is hemodynamically stable, vital signs are normal. Discharge instructions given. Return to ED precautions discussed. Follow up as directed. Pt verbalized understanding of this plan. Pt is stable for discharge.                      Clinical Impression:   The primary encounter diagnosis was Abdominal pain, unspecified abdominal location. A diagnosis of Abdominal hernia without obstruction and without gangrene, recurrence not specified, unspecified  hernia type was also pertinent to this visit.      Disposition:   Disposition: Discharged  Condition: Stable                        Amberly Watson NP  01/27/19 4899

## 2019-01-31 ENCOUNTER — HOSPITAL ENCOUNTER (OUTPATIENT)
Dept: CARDIOLOGY | Facility: CLINIC | Age: 64
Discharge: HOME | End: 2019-01-31
Payer: MEDICARE

## 2019-01-31 ENCOUNTER — OFFICE VISIT (OUTPATIENT)
Dept: SURGERY | Facility: CLINIC | Age: 64
End: 2019-01-31
Payer: MEDICARE

## 2019-01-31 VITALS
HEIGHT: 60 IN | BODY MASS INDEX: 21.99 KG/M2 | DIASTOLIC BLOOD PRESSURE: 104 MMHG | WEIGHT: 112 LBS | SYSTOLIC BLOOD PRESSURE: 168 MMHG | HEART RATE: 71 BPM

## 2019-01-31 DIAGNOSIS — Z01.818 PRE-OP TESTING: Primary | ICD-10-CM

## 2019-01-31 DIAGNOSIS — Z01.818 PRE-OP TESTING: ICD-10-CM

## 2019-01-31 DIAGNOSIS — K42.9 UMBILICAL HERNIA WITHOUT OBSTRUCTION AND WITHOUT GANGRENE: ICD-10-CM

## 2019-01-31 PROCEDURE — 93000 EKG 12-LEAD: ICD-10-PCS | Mod: S$GLB,,, | Performed by: INTERNAL MEDICINE

## 2019-01-31 PROCEDURE — 3008F BODY MASS INDEX DOCD: CPT | Mod: CPTII,S$GLB,, | Performed by: SURGERY

## 2019-01-31 PROCEDURE — 3008F PR BODY MASS INDEX (BMI) DOCUMENTED: ICD-10-PCS | Mod: CPTII,S$GLB,, | Performed by: SURGERY

## 2019-01-31 PROCEDURE — 3080F PR MOST RECENT DIASTOLIC BLOOD PRESSURE >= 90 MM HG: ICD-10-PCS | Mod: CPTII,S$GLB,, | Performed by: SURGERY

## 2019-01-31 PROCEDURE — 3077F PR MOST RECENT SYSTOLIC BLOOD PRESSURE >= 140 MM HG: ICD-10-PCS | Mod: CPTII,S$GLB,, | Performed by: SURGERY

## 2019-01-31 PROCEDURE — 99204 PR OFFICE/OUTPT VISIT, NEW, LEVL IV, 45-59 MIN: ICD-10-PCS | Mod: S$GLB,,, | Performed by: SURGERY

## 2019-01-31 PROCEDURE — 99999 PR PBB SHADOW E&M-EST. PATIENT-LVL III: ICD-10-PCS | Mod: PBBFAC,,, | Performed by: SURGERY

## 2019-01-31 PROCEDURE — 99204 OFFICE O/P NEW MOD 45 MIN: CPT | Mod: S$GLB,,, | Performed by: SURGERY

## 2019-01-31 PROCEDURE — 3080F DIAST BP >= 90 MM HG: CPT | Mod: CPTII,S$GLB,, | Performed by: SURGERY

## 2019-01-31 PROCEDURE — 93005 EKG 12-LEAD: ICD-10-PCS | Mod: S$GLB,,, | Performed by: SURGERY

## 2019-01-31 PROCEDURE — 93005 ELECTROCARDIOGRAM TRACING: CPT | Mod: S$GLB,,, | Performed by: SURGERY

## 2019-01-31 PROCEDURE — 99999 PR PBB SHADOW E&M-EST. PATIENT-LVL III: CPT | Mod: PBBFAC,,, | Performed by: SURGERY

## 2019-01-31 PROCEDURE — 3077F SYST BP >= 140 MM HG: CPT | Mod: CPTII,S$GLB,, | Performed by: SURGERY

## 2019-01-31 PROCEDURE — 93000 ELECTROCARDIOGRAM COMPLETE: CPT | Mod: S$GLB,,, | Performed by: INTERNAL MEDICINE

## 2019-01-31 RX ORDER — ATORVASTATIN CALCIUM 40 MG/1
40 TABLET, FILM COATED ORAL DAILY
COMMUNITY
Start: 2015-04-07 | End: 2019-03-21 | Stop reason: SDUPTHER

## 2019-01-31 RX ORDER — ERGOCALCIFEROL 1.25 MG/1
CAPSULE ORAL
COMMUNITY
Start: 2015-04-09 | End: 2019-03-21 | Stop reason: SDUPTHER

## 2019-01-31 RX ORDER — FLUOXETINE HYDROCHLORIDE 40 MG/1
CAPSULE ORAL DAILY
COMMUNITY
End: 2020-06-16 | Stop reason: ALTCHOICE

## 2019-01-31 RX ORDER — IBUPROFEN 800 MG/1
TABLET ORAL
Status: ON HOLD | COMMUNITY
Start: 2015-08-04 | End: 2021-06-28 | Stop reason: HOSPADM

## 2019-01-31 NOTE — H&P (VIEW-ONLY)
History & Physical    SUBJECTIVE:     History of Present Illness:  Patient is a 63 y.o. female presents with umbilical hernia. States it has been present for at least 10 years but only recently it has started to be painful. She went to ED last weekend d/t pain. States they were able to reduce it there. She endorses about 1.5 weeks diarrhea, no fever or chills. Endorses some blood in the stool. Has been eating normal diet.  PSHx significant for lap converted to open cholecystectomy at least 20 years ago.   Current smoker, trying to quit. On Xanax for anxiety. Has some intermittent chest pain with anxiety. Shortness of breath with exertion but easily climbs a flight of stairs.  Colonoscopy 4/2018 6 mm cecal tubular adenoma and internal hemorrhoids.  No recent ECHO.     Chief Complaint   Patient presents with    Hernia       Review of patient's allergies indicates:   Allergen Reactions    Versed [midazolam]      Intravenous versed causes altered mental status       Current Outpatient Medications   Medication Sig Dispense Refill    aspirin 81 MG Chew Take 81 mg by mouth once daily.      atorvastatin (LIPITOR) 40 MG tablet       ergocalciferol (ERGOCALCIFEROL) 50,000 unit Cap       FLUoxetine (PROZAC) 40 MG capsule Take by mouth.      ibuprofen (ADVIL,MOTRIN) 800 MG tablet       levETIRAcetam (KEPPRA) 500 MG Tab Take 1 tablet (500 mg total) by mouth 2 (two) times daily. 180 tablet 3    lisinopril 10 MG tablet Take 1 tablet (10 mg total) by mouth once daily. 90 tablet 3    mirtazapine (REMERON) 15 MG tablet 1/2-1 tab nightly for treatment of insomnia and to increase appetite 30 tablet 11    varicella-zoster gE-AS01B, PF, (SHINGRIX) 50 mcg/0.5 mL injection Inject into the muscle BY [facility] 0.5 mL 0    ALPRAZolam (XANAX) 0.5 MG tablet Take 1 tablet (0.5 mg total) by mouth daily as needed for Insomnia or Anxiety (panic attack). 30 tablet 2     No current facility-administered medications for this visit.        Past  Medical History:   Diagnosis Date    Anxiety and depression 3/21/2018    Benign essential hypertension 3/21/2018    Chronic bilateral low back pain without sciatica 3/21/2018    History of stroke 3/21/2018    Memory loss     Mixed hyperlipidemia 3/21/2018    Seizures      Past Surgical History:   Procedure Laterality Date    ANTERIOR VAGINAL REPAIR      CATARACT EXTRACTION Right 10/16/2007    Dr. Ramsey //yag 5/1/18 Dr. Bentley    CATARACT EXTRACTION W/  INTRAOCULAR LENS IMPLANT Left 10/30/2007    Dr. Ramsey// yag 5/1/18 Dr. Bentley    CHOLECYSTECTOMY      COLONOSCOPY N/A 4/19/2018    Performed by [facility]cal[facility] at [facility] ENDO    EYE SURGERY      TONSILLECTOMY       Family History   Problem Relation Age of Onset    Blindness Neg Hx     Glaucoma Neg Hx     Macular degeneration Neg Hx     Retinal detachment Neg Hx      Social History     Tobacco Use    Smoking status: Current Every Day Smoker     Types: Cigarettes    Smokeless tobacco: Never Used    Tobacco comment: 1 PPD   Substance Use Topics    Alcohol use: Yes     Comment: 1-2 drinks daily    Drug use: Yes     Types: Marijuana     Comment: intermittent to take the edge off of her anxiety        Review of Systems:  Review of Systems   Constitutional: Negative for activity change, appetite change, chills, fatigue, fever and unexpected weight change.   Respiratory: Negative for shortness of breath.    Cardiovascular: Positive for chest pain (with anxiety). Negative for palpitations and leg swelling.   Gastrointestinal: Positive for abdominal pain, blood in stool and diarrhea. Negative for abdominal distention, constipation, rectal pain and vomiting.   Endocrine: Negative.    Genitourinary: Negative.    Musculoskeletal: Negative.    Skin: Negative.    Neurological: Negative.    Hematological: Negative.    Psychiatric/Behavioral: Negative.        OBJECTIVE:     Vital Signs (Most Recent)  Pulse: 71 (01/31/19 1312)  BP: (!) 168/104 (01/31/19  1312)  5' (1.524 m)  50.8 kg (112 lb)     Physical Exam:  Physical Exam   Constitutional: She is oriented to person, place, and time. She appears well-developed and well-nourished. No distress.   Eyes: EOM are normal. Pupils are equal, round, and reactive to light.   Neck: Neck supple.   Cardiovascular: Normal rate and regular rhythm.   Pulmonary/Chest: Effort normal.   Abdominal: Soft. Bowel sounds are normal. She exhibits no distension. There is tenderness. A hernia is present.   Umbilical hernia, not able to reduce in clinic   Musculoskeletal: Normal range of motion.   Neurological: She is alert and oriented to person, place, and time.   Skin: Skin is warm and dry. Capillary refill takes less than 2 seconds.   Psychiatric: She has a normal mood and affect. Her behavior is normal.   Nursing note and vitals reviewed.      Laboratory  none    Diagnostic Results:  CT from 1/27/19    ASSESSMENT/PLAN:     64 yo woman with umbilical hernia    PLAN:Plan     Plan for repair next week         I have personally performed a detailed history and physical examination on this patient. My findings are summarized in the resident's note included in the record.  Plan repair next week

## 2019-01-31 NOTE — PATIENT INSTRUCTIONS
What Is a Hernia?    A hernia is when an organ or tissue pushes through a weak area in the belly (abdominal) wall. This weak area may be present at birth. Or it may be caused by abdominal strain over time. If not treated, a hernia can get worse with time and physical stress.  When a bulge forms  When there is a weak area in the abdominal wall, an organ or tissue can push outward. This often causes a bulge that you can see under your skin. The bulge may get bigger when you stand up. It may go away when you lie down. You may also feel some pressure or mild pain when lifting, coughing, urinating, or doing other activities.  Types of hernias  The type of hernia you have depends on its location. Most hernias form in the groin at or near the internal ring. This is the entrance to a canal between the abdomen and groin. Hernias can also occur in the abdomen, thigh, or genitals.  · An incisional hernia occurs at the site of a previous surgical incision.  · An umbilical hernia occurs at the navel.  · An indirect inguinal hernia occurs in the groin at the internal ring.  · A direct inguinal hernia occurs in the groin near the internal ring.  · A femoral hernia occurs just below the groin.  · An epigastric hernia occurs in the upper abdomen at the midline.  Diagnosis  In most cases, your healthcare provider can diagnose a hernia by doing a physical exam.  In some cases it might not be clear why you have a swelling in the belly wall. Then your provider may order an imaging test such as an ultrasound. This can help with the diagnosis.  Surgery  A hernia will not heal on its own. Surgery is needed to fix the weak spot in the abdominal wall. If not treated, a hernia can get larger. It can also cause serious health problems. The good news is that hernia surgery can be done quickly and safely. In some cases, you can go home the same day as your surgery.   When to call your provider  Call your healthcare provider right away if the  swelling around your hernia becomes larger, firmer, or more painful. These may be signs that your intestines are stuck in the abdominal wall. This is an emergency. The hernia must be repaired right away to avoid serious problems.  Date Last Reviewed: 7/1/2016  © 8580-5990 [employer]. [address] [place] [address]. All rights reserved. This information is not intended as a substitute for professional medical care. Always follow your healthcare professional's instructions.        How a Hernia Develops  Although a hernia bulge may appear suddenly, hernias often take years to develop. They grow larger as pressure inside the body presses the intestines or other tissues out through a weak area in the abdominal wall, often at the belly button or a site of previous surgery. With time, these tissues can bulge out beneath the skin.  Stages of hernia development    The wall weakens or tears. The abdominal lining bulges out through a weak area and begins to form a hernia sac. The sac may contain fat, intestine, or other tissues. At this point, the hernia may or may not cause a visible bulge.        The intestine pushes into the sac. As the intestine pushes further into the sac, it forms a visible bulge. The bulge may flatten when you lie down or push against it. This is called a reducible hernia and does not cause any immediate danger.             The intestine may become trapped. The sac containing the intestine may become trapped by muscle (incarcerated). If this happens, you wont be able to flatten the bulge. You may also have pain. Prompt treatment is needed.        The intestine may become strangulated. If the intestine is tightly trapped, it becomes strangulated. The strangulated area loses blood supply and may die. This can cause severe pain and block the intestine. Emergency surgery is needed.   Date Last Reviewed: 8/1/2016  © 6239-4314 [employer]. [address] [place] [address],  [place] [address]. All rights reserved. This information is not intended as a substitute for professional medical care. Always follow your healthcare professional's instructions.        Having Hernia Surgery: Patch Repair  Surgery treats a hernia by repairing the weakness in the abdominal wall. An incision is made so the surgeon has a direct view of the hernia. The repair is then done through this incision (open surgery). To repair the defect, special mesh materials are used to patch the weak area and make a tension-free repair. Follow your healthcare providers advice on how to get ready for the procedure. You can usually go home the same day as your surgery. In some cases, though, you may need to stay in the hospital overnight.  Getting ready for surgery  Your healthcare provider will talk with you about preparing for surgery. Follow all the instructions youre given and be sure to:   · Tell your healthcare provider about any medicines, supplements, or herbs you take. This includes both prescription and over-the-counter items.  · Stop taking aspirin, ibuprofen, naproxen and other NSAIDs as directed.  · Arrange for an adult family member or friend to give you a ride home after surgery.  · Stop smoking. Smoking affects blood flow and can slow healing.  · Gently wash the surgical area the night before surgery.  · Follow any directions you are given for not eating or drinking before surgery.     Repair in Front           Repair in Back           Combination Repair      The day of surgery  Arrive at the hospital or surgical center at your scheduled time. Youll be asked to change into a patient gown. Youll then be given an IV to provide fluids and medicine. Shortly before surgery, an anesthesiologist or nurse anesthetist will talk with you. He or she will explain the types of anesthesia used to prevent pain during surgery. You will have one or more of the following:  · Monitored sedation to make you relaxed and  sleepy.  · Local anesthesia to numb the surgical site.  · Regional anesthesia to numb specific areas of your body.  · General anesthesia to let you sleep during surgery.  During the surgery  Most hernias are treated using tension-free repairs. This is surgery that uses special mesh materials to repair the weak area. Unlike traditional repairs, the abdominal fascia (tissue around the muscle that gives strength to the abdominal wall) isnt sewn together. Instead, the mesh covers the weak area like a patch. This repairs the defect without tension on the muscles. It also makes it less likely to happen again. The mesh is made of strong, flexible plastic that stays in the body. Over time, nearby tissues grow into the mesh to strengthen the repair.  After surgery  When the procedure is over, youll be taken to the recovery area to rest. Your blood pressure and heart rate will be monitored. Youll also have a bandage over the surgical site. To help reduce discomfort, youll be given pain medicines. You may also be given breathing exercises to keep your lungs clear. Later, youll be asked to get up and walk. This helps prevent blood clots in the legs. You can go home when your healthcare provider says youre ready.     Risks and complications  Hernia surgery is safe, but does have risks including:  · Bleeding  · Infection  · Anesthesia risks  · Mesh complications  · Inability to urinate   · Bowel or bladder injury   · Numbness or pain in the groin or leg  · Risk the hernia will happen again  · Damage to the testicles or testicular function      Date Last Reviewed: 10/1/2016  © 3360-3863 [employer]. [address] [place] [address], [place] [address]. All rights reserved. This information is not intended as a substitute for professional medical care. Always follow your healthcare professional's instructions.

## 2019-01-31 NOTE — LETTER
January 31, 2019      Amberly Watson, NP  [address] [facility] [address]           [facility] - General Surgery  [address]  [facility] [address]  Phone: [phone]          Patient: Gena Lui   MR Number: [id number]   YOB: 1955   Date of Visit: 1/31/2019       Dear Amberly Watson:    Thank you for referring Gena Lui to me for evaluation. Attached you will find relevant portions of my assessment and plan of care.    If you have questions, please do not hesitate to call me. I look forward to following Gena Lui along with you.    Sincerely,    Magno Mishra MD    Enclosure  CC:  No Recipients    If you would like to receive this communication electronically, please contact [email or web address] or [phone] to request more information on [employer] Link access.    For providers and/or their staff who would like to refer a patient to [facility], please contact us through our one-stop-shop provider referral line, [facility], at [phone].    If you feel you have received this communication in error or would no longer like to receive these types of communications, please e-mail [email or web address]

## 2019-01-31 NOTE — PROGRESS NOTES
History & Physical    SUBJECTIVE:     History of Present Illness:  Patient is a 63 y.o. female presents with umbilical hernia. States it has been present for at least 10 years but only recently it has started to be painful. She went to ED last weekend d/t pain. States they were able to reduce it there. She endorses about 1.5 weeks diarrhea, no fever or chills. Endorses some blood in the stool. Has been eating normal diet.  PSHx significant for lap converted to open cholecystectomy at least 20 years ago.   Current smoker, trying to quit. On Xanax for anxiety. Has some intermittent chest pain with anxiety. Shortness of breath with exertion but easily climbs a flight of stairs.  Colonoscopy 4/2018 6 mm cecal tubular adenoma and internal hemorrhoids.  No recent ECHO.     Chief Complaint   Patient presents with    Hernia       Review of patient's allergies indicates:   Allergen Reactions    Versed [midazolam]      Intravenous versed causes altered mental status       Current Outpatient Medications   Medication Sig Dispense Refill    aspirin 81 MG Chew Take 81 mg by mouth once daily.      atorvastatin (LIPITOR) 40 MG tablet       ergocalciferol (ERGOCALCIFEROL) 50,000 unit Cap       FLUoxetine (PROZAC) 40 MG capsule Take by mouth.      ibuprofen (ADVIL,MOTRIN) 800 MG tablet       levETIRAcetam (KEPPRA) 500 MG Tab Take 1 tablet (500 mg total) by mouth 2 (two) times daily. 180 tablet 3    lisinopril 10 MG tablet Take 1 tablet (10 mg total) by mouth once daily. 90 tablet 3    mirtazapine (REMERON) 15 MG tablet 1/2-1 tab nightly for treatment of insomnia and to increase appetite 30 tablet 11    varicella-zoster gE-AS01B, PF, (SHINGRIX) 50 mcg/0.5 mL injection Inject into the muscle BY [facility] 0.5 mL 0    ALPRAZolam (XANAX) 0.5 MG tablet Take 1 tablet (0.5 mg total) by mouth daily as needed for Insomnia or Anxiety (panic attack). 30 tablet 2     No current facility-administered medications for this visit.        Past  Medical History:   Diagnosis Date    Anxiety and depression 3/21/2018    Benign essential hypertension 3/21/2018    Chronic bilateral low back pain without sciatica 3/21/2018    History of stroke 3/21/2018    Memory loss     Mixed hyperlipidemia 3/21/2018    Seizures      Past Surgical History:   Procedure Laterality Date    ANTERIOR VAGINAL REPAIR      CATARACT EXTRACTION Right 10/16/2007    Dr. Ramsey //yag 5/1/18 Dr. Bentley    CATARACT EXTRACTION W/  INTRAOCULAR LENS IMPLANT Left 10/30/2007    Dr. Ramsey// yag 5/1/18 Dr. Bentley    CHOLECYSTECTOMY      COLONOSCOPY N/A 4/19/2018    [facility]    EYE SURGERY      TONSILLECTOMY       Family History   Problem Relation Age of Onset    Blindness Neg Hx     Glaucoma Neg Hx     Macular degeneration Neg Hx     Retinal detachment Neg Hx      Social History     Tobacco Use    Smoking status: Current Every Day Smoker     Types: Cigarettes    Smokeless tobacco: Never Used    Tobacco comment: 1 PPD   Substance Use Topics    Alcohol use: Yes     Comment: 1-2 drinks daily    Drug use: Yes     Types: Marijuana     Comment: intermittent to take the edge off of her anxiety        Review of Systems:  Review of Systems   Constitutional: Negative for activity change, appetite change, chills, fatigue, fever and unexpected weight change.   Respiratory: Negative for shortness of breath.    Cardiovascular: Positive for chest pain (with anxiety). Negative for palpitations and leg swelling.   Gastrointestinal: Positive for abdominal pain, blood in stool and diarrhea. Negative for abdominal distention, constipation, rectal pain and vomiting.   Endocrine: Negative.    Genitourinary: Negative.    Musculoskeletal: Negative.    Skin: Negative.    Neurological: Negative.    Hematological: Negative.    Psychiatric/Behavioral: Negative.        OBJECTIVE:     Vital Signs (Most Recent)  Pulse: 71 (01/31/19 1312)  BP: (!) 168/104 (01/31/19  1312)  5' (1.524 m)  50.8 kg (112 lb)     Physical Exam:  Physical Exam   Constitutional: She is oriented to person, place, and time. She appears well-developed and well-nourished. No distress.   Eyes: EOM are normal. Pupils are equal, round, and reactive to light.   Neck: Neck supple.   Cardiovascular: Normal rate and regular rhythm.   Pulmonary/Chest: Effort normal.   Abdominal: Soft. Bowel sounds are normal. She exhibits no distension. There is tenderness. A hernia is present.   Umbilical hernia, not able to reduce in clinic   Musculoskeletal: Normal range of motion.   Neurological: She is alert and oriented to person, place, and time.   Skin: Skin is warm and dry. Capillary refill takes less than 2 seconds.   Psychiatric: She has a normal mood and affect. Her behavior is normal.   Nursing note and vitals reviewed.      Laboratory  none    Diagnostic Results:  CT from 1/27/19    ASSESSMENT/PLAN:     62 yo woman with umbilical hernia    PLAN:Plan     Plan for repair next week         I have personally performed a detailed history and physical examination on this patient. My findings are summarized in the resident's note included in the record.  Plan repair next week

## 2019-02-04 DIAGNOSIS — K42.9 UMBILICAL HERNIA: ICD-10-CM

## 2019-02-04 RX ORDER — SODIUM CHLORIDE 9 MG/ML
INJECTION, SOLUTION INTRAVENOUS CONTINUOUS
Status: CANCELLED | OUTPATIENT
Start: 2019-02-04

## 2019-02-07 ENCOUNTER — ANESTHESIA EVENT (OUTPATIENT)
Dept: SURGERY | Facility: HOSPITAL | Age: 64
End: 2019-02-07
Payer: MEDICARE

## 2019-02-07 ENCOUNTER — TELEPHONE (OUTPATIENT)
Dept: SURGERY | Facility: CLINIC | Age: 64
End: 2019-02-07

## 2019-02-07 NOTE — TELEPHONE ENCOUNTER
Pre op call completed, instructed to arrive at 0645, NPO after MN, and have someone to drive her home and she stated that it would be her daughter, no further questions verbalized at this time.

## 2019-02-07 NOTE — PRE-PROCEDURE INSTRUCTIONS
Preop instructions: NPO solids/ milk products after midnight or clears up to 2 hours before arrival (clear liquids are: water, apple juice, Gatorade & Jell-O, black coffee/no milk, cream or creamer), shower instructions, directions, leave all valuables at home, medication instructions for PM prior & am of procedure explained. Patient stated an understanding.     Patient denies any side effects or issues with anesthesia or sedation.

## 2019-02-07 NOTE — ANESTHESIA PREPROCEDURE EVALUATION
[facility]  Anesthesia Pre-Operative Evaluation         Patient Name: Gena Lui  YOB: 1955  MRN: [id number]    SUBJECTIVE:     02/07/2019    Pre-operative evaluation for Procedure(s) (LRB):  REPAIR, HERNIA, UMBILICAL, AGE 5 YEARS OR OLDER, open with/without mesh (N/A)    Gena Lui is a 63 y.o. female with HTN, seizure disorder (on Keppra), Hx of CVA, anxiety, and umbilical hernia.    Patient now presents for the above procedure(s).      Previous airway:   None documented.      Patient Active Problem List   Diagnosis    Anxiety and depression    Benign essential hypertension    Mixed hyperlipidemia    History of stroke    Chronic bilateral low back pain without sciatica    Long-term use of aspirin therapy    Tobacco abuse    History of seizures    Postmenopausal osteoporosis    Vitamin D deficiency    History of alcohol abuse       Review of patient's allergies indicates:   Allergen Reactions    Versed [midazolam]      IV Versed altered mental status       No current facility-administered medications on file prior to encounter.      Current Outpatient Medications on File Prior to Encounter   Medication Sig Dispense Refill    ALPRAZolam (XANAX) 0.5 MG tablet Take 1 tablet (0.5 mg total) by mouth daily as needed for Insomnia or Anxiety (panic attack). 30 tablet 2    aspirin 81 MG Chew Take 81 mg by mouth every evening.       atorvastatin (LIPITOR) 40 MG tablet Take 40 mg by mouth once daily.       FLUoxetine (PROZAC) 40 MG capsule Take by mouth once daily.       levETIRAcetam (KEPPRA) 500 MG Tab Take 1 tablet (500 mg total) by mouth 2 (two) times daily. 180 tablet 3    lisinopril 10 MG tablet Take 1 tablet (10 mg total) by mouth once daily. 90 tablet 3    mirtazapine (REMERON) 15 MG tablet 1/2-1 tab nightly for treatment of insomnia and to increase appetite 30 tablet 11    ergocalciferol (ERGOCALCIFEROL) 50,000 unit Cap every 7 days.       ibuprofen  (ADVIL,MOTRIN) 800 MG tablet          Past Surgical History:   Procedure Laterality Date    ANTERIOR VAGINAL REPAIR      CATARACT EXTRACTION Right 10/16/2007    Dr. Ramsey //yag 5/1/18 Dr. Bentley    CATARACT EXTRACTION W/  INTRAOCULAR LENS IMPLANT Left 10/30/2007    Dr. Ramsey// yag 5/1/18 Dr. Bentley    CHOLECYSTECTOMY      COLONOSCOPY N/A 4/19/2018    [facility]    EYE SURGERY      TONSILLECTOMY         Social History     Socioeconomic History    Marital status:      Spouse name: Not on file    Number of children: Not on file    Years of education: Not on file    Highest education level: Not on file   Social Needs    Financial resource strain: Not on file    Food insecurity - worry: Not on file    Food insecurity - inability: Not on file    Transportation needs - medical: Not on file    Transportation needs - non-medical: Not on file   Occupational History    Not on file   Tobacco Use    Smoking status: Current Every Day Smoker     Types: Cigarettes    Smokeless tobacco: Never Used    Tobacco comment: 1 PPD   Substance and Sexual Activity    Alcohol use: Yes     Comment: 1-2 drinks daily    Drug use: Yes     Types: Marijuana     Comment: intermittent to take the edge off of her anxiety    Sexual activity: No   Other Topics Concern    Not on file   Social History Narrative    Not on file       OBJECTIVE:     Significant Labs:  Lab Results   Component Value Date    WBC 6.12 01/31/2019    HGB 12.8 01/31/2019    HCT 39.5 01/31/2019     01/31/2019    CHOL 152 10/01/2018    TRIG 158 (H) 10/01/2018    HDL 44 10/01/2018    ALT 32 01/31/2019    AST 27 01/31/2019     01/31/2019    K 3.9 01/31/2019     01/31/2019    CREATININE 0.9 01/31/2019    BUN 11 01/31/2019    CO2 25 01/31/2019    TSH 2.759 03/21/2018    INR 1.0 03/23/2011    HGBA1C 4.8 03/21/2018       Diagnostic Studies:  No relevant studies.      Cardiac Studies:  EKG (1/31/2019):    Vent. Rate : 061 BPM     Atrial Rate : 061 BPM     P-R Int : 186 ms          QRS Dur : 076 ms      QT Int : 448 ms       P-R-T Axes : 075 082 067 degrees     QTc Int : 450 ms  Normal sinus rhythm  Possible Left atrial enlargement  Borderline Abnormal ECG  When compared with ECG of 02-FEB-2018 15:38,  No significant change was found  Confirmed by DAYSI JEAN-BAPTISTE MD ([id number]) on 1/31/2019 6:18:15 PM    2D Echo:  No results found for this or any previous visit.      ASSESSMENT/PLAN:     Anesthesia Evaluation    I have reviewed the Patient Summary Reports.    I have reviewed the Nursing Notes.   I have reviewed the Medications.     Review of Systems  Anesthesia Hx:  No problems with previous Anesthesia Denies Hx of Anesthetic complications  History of prior surgery of interest to airway management or planning: Previous anesthesia: General Denies Family Hx of Anesthesia complications.   Denies Personal Hx of Anesthesia complications.   Cardiovascular:   Exercise tolerance: good Hypertension    Pulmonary:  Pulmonary Normal    Renal/:  Renal/ Normal     Hepatic/GI:  Hepatic/GI Normal    Neurological:   CVA, no residual symptoms Seizures    Endocrine:  Endocrine Normal    Psych:   anxiety          Physical Exam  General:  Well nourished    Airway/Jaw/Neck:  Airway Findings: Mouth Opening: Normal Tongue: Normal  General Airway Assessment: Adult  Mallampati: II  Improves to II with phonation.  TM Distance: Normal, at least 6 cm  Jaw/Neck Findings:  Neck ROM: Normal ROM      Dental:  Dental Findings: Periodontal disease, Severe   Chest/Lungs:  Chest/Lungs Findings: Clear to auscultation, Normal Respiratory Rate     Heart/Vascular:  Heart Findings: Rate: Normal  Rhythm: Regular Rhythm  Sounds: Normal        Mental Status:  Mental Status Findings:  Cooperative, Alert and Oriented         Anesthesia Plan  Type of Anesthesia, risks & benefits discussed:  Anesthesia Type:  general  Patient's Preference:   Intra-op Monitoring Plan:  standard ASA monitors  Intra-op Monitoring Plan Comments:   Post Op Pain Control Plan: multimodal analgesia  Post Op Pain Control Plan Comments:   Induction:   IV  Beta Blocker:         Informed Consent: Patient understands risks and agrees with Anesthesia plan.  Questions answered. Anesthesia consent signed with patient.  ASA Score: 3     Day of Surgery Review of History & Physical:    H&P update referred to the surgeon.         Ready For Surgery From Anesthesia Perspective.

## 2019-02-08 ENCOUNTER — ANESTHESIA (OUTPATIENT)
Dept: SURGERY | Facility: HOSPITAL | Age: 64
End: 2019-02-08
Payer: MEDICARE

## 2019-02-08 ENCOUNTER — HOSPITAL ENCOUNTER (OUTPATIENT)
Facility: HOSPITAL | Age: 64
Discharge: HOME | End: 2019-02-08
Attending: SURGERY | Admitting: SURGERY
Payer: MEDICARE

## 2019-02-08 VITALS
HEIGHT: 59 IN | BODY MASS INDEX: 22.58 KG/M2 | RESPIRATION RATE: 18 BRPM | HEART RATE: 71 BPM | SYSTOLIC BLOOD PRESSURE: 122 MMHG | DIASTOLIC BLOOD PRESSURE: 76 MMHG | TEMPERATURE: 98 F | OXYGEN SATURATION: 100 % | WEIGHT: 112 LBS

## 2019-02-08 DIAGNOSIS — K42.9 UMBILICAL HERNIA: Primary | ICD-10-CM

## 2019-02-08 PROCEDURE — 71000039 HC RECOVERY, EACH ADD'L HOUR: Performed by: SURGERY

## 2019-02-08 PROCEDURE — D9220A PRA ANESTHESIA: Mod: CRNA,,, | Performed by: NURSE ANESTHETIST, CERTIFIED REGISTERED

## 2019-02-08 PROCEDURE — D9220A PRA ANESTHESIA: ICD-10-PCS | Mod: ANES,,, | Performed by: ANESTHESIOLOGY

## 2019-02-08 PROCEDURE — 63600175 PHARM REV CODE 636 W HCPCS: Performed by: ANESTHESIOLOGY

## 2019-02-08 PROCEDURE — 25000003 PHARM REV CODE 250

## 2019-02-08 PROCEDURE — 71000016 HC POSTOP RECOV ADDL HR: Performed by: SURGERY

## 2019-02-08 PROCEDURE — 37000008 HC ANESTHESIA 1ST 15 MINUTES: Performed by: SURGERY

## 2019-02-08 PROCEDURE — 49587 PR REPAIR UMBILICAL HERN,5+Y/O,STRANG: CPT | Mod: ,,, | Performed by: SURGERY

## 2019-02-08 PROCEDURE — 36000706: Performed by: SURGERY

## 2019-02-08 PROCEDURE — 63600175 PHARM REV CODE 636 W HCPCS: Performed by: PHYSICIAN ASSISTANT

## 2019-02-08 PROCEDURE — 25000003 PHARM REV CODE 250: Performed by: SURGERY

## 2019-02-08 PROCEDURE — 71000033 HC RECOVERY, INTIAL HOUR: Performed by: SURGERY

## 2019-02-08 PROCEDURE — D9220A PRA ANESTHESIA: Mod: ANES,,, | Performed by: ANESTHESIOLOGY

## 2019-02-08 PROCEDURE — 25000003 PHARM REV CODE 250: Performed by: NURSE ANESTHETIST, CERTIFIED REGISTERED

## 2019-02-08 PROCEDURE — 94761 N-INVAS EAR/PLS OXIMETRY MLT: CPT

## 2019-02-08 PROCEDURE — 71000015 HC POSTOP RECOV 1ST HR: Performed by: SURGERY

## 2019-02-08 PROCEDURE — 49587 PR REPAIR UMBILICAL HERN,5+Y/O,STRANG: ICD-10-PCS | Mod: ,,, | Performed by: SURGERY

## 2019-02-08 PROCEDURE — 37000009 HC ANESTHESIA EA ADD 15 MINS: Performed by: SURGERY

## 2019-02-08 PROCEDURE — D9220A PRA ANESTHESIA: ICD-10-PCS | Mod: CRNA,,, | Performed by: NURSE ANESTHETIST, CERTIFIED REGISTERED

## 2019-02-08 PROCEDURE — 25000003 PHARM REV CODE 250: Performed by: PHYSICIAN ASSISTANT

## 2019-02-08 PROCEDURE — 27000221 HC OXYGEN, UP TO 24 HOURS

## 2019-02-08 PROCEDURE — 63600175 PHARM REV CODE 636 W HCPCS: Performed by: NURSE ANESTHETIST, CERTIFIED REGISTERED

## 2019-02-08 PROCEDURE — 36000707: Performed by: SURGERY

## 2019-02-08 RX ORDER — PROPOFOL 10 MG/ML
VIAL (ML) INTRAVENOUS
Status: DISCONTINUED | OUTPATIENT
Start: 2019-02-08 | End: 2019-02-08

## 2019-02-08 RX ORDER — LIDOCAINE HCL/PF 100 MG/5ML
SYRINGE (ML) INTRAVENOUS
Status: DISCONTINUED | OUTPATIENT
Start: 2019-02-08 | End: 2019-02-08

## 2019-02-08 RX ORDER — FENTANYL CITRATE 50 UG/ML
INJECTION, SOLUTION INTRAMUSCULAR; INTRAVENOUS
Status: DISCONTINUED | OUTPATIENT
Start: 2019-02-08 | End: 2019-02-08

## 2019-02-08 RX ORDER — ROCURONIUM BROMIDE 10 MG/ML
INJECTION, SOLUTION INTRAVENOUS
Status: DISCONTINUED | OUTPATIENT
Start: 2019-02-08 | End: 2019-02-08

## 2019-02-08 RX ORDER — OXYCODONE AND ACETAMINOPHEN 5; 325 MG/1; MG/1
1 TABLET ORAL EVERY 4 HOURS PRN
Qty: 15 TABLET | Refills: 0 | Status: SHIPPED | OUTPATIENT
Start: 2019-02-08 | End: 2019-04-10

## 2019-02-08 RX ORDER — FENTANYL CITRATE 50 UG/ML
25 INJECTION, SOLUTION INTRAMUSCULAR; INTRAVENOUS EVERY 5 MIN PRN
Status: COMPLETED | OUTPATIENT
Start: 2019-02-08 | End: 2019-02-08

## 2019-02-08 RX ORDER — CEFAZOLIN SODIUM 1 G/3ML
2 INJECTION, POWDER, FOR SOLUTION INTRAMUSCULAR; INTRAVENOUS
Status: COMPLETED | OUTPATIENT
Start: 2019-02-08 | End: 2019-02-08

## 2019-02-08 RX ORDER — OXYCODONE AND ACETAMINOPHEN 5; 325 MG/1; MG/1
TABLET ORAL
Status: COMPLETED
Start: 2019-02-08 | End: 2019-02-08

## 2019-02-08 RX ORDER — GLYCOPYRROLATE 0.2 MG/ML
INJECTION INTRAMUSCULAR; INTRAVENOUS
Status: DISCONTINUED | OUTPATIENT
Start: 2019-02-08 | End: 2019-02-08

## 2019-02-08 RX ORDER — OXYCODONE AND ACETAMINOPHEN 5; 325 MG/1; MG/1
1 TABLET ORAL ONCE AS NEEDED
Status: COMPLETED | OUTPATIENT
Start: 2019-02-08 | End: 2019-02-08

## 2019-02-08 RX ORDER — SODIUM CHLORIDE 0.9 % (FLUSH) 0.9 %
3 SYRINGE (ML) INJECTION
Status: DISCONTINUED | OUTPATIENT
Start: 2019-02-08 | End: 2019-02-08 | Stop reason: HOSPADM

## 2019-02-08 RX ORDER — SODIUM CHLORIDE 9 MG/ML
INJECTION, SOLUTION INTRAVENOUS CONTINUOUS
Status: DISCONTINUED | OUTPATIENT
Start: 2019-02-08 | End: 2019-02-08 | Stop reason: HOSPADM

## 2019-02-08 RX ORDER — NEOSTIGMINE METHYLSULFATE 1 MG/ML
INJECTION, SOLUTION INTRAVENOUS
Status: DISCONTINUED | OUTPATIENT
Start: 2019-02-08 | End: 2019-02-08

## 2019-02-08 RX ORDER — ACETAMINOPHEN 10 MG/ML
INJECTION, SOLUTION INTRAVENOUS
Status: DISCONTINUED | OUTPATIENT
Start: 2019-02-08 | End: 2019-02-08

## 2019-02-08 RX ORDER — DEXAMETHASONE SODIUM PHOSPHATE 4 MG/ML
INJECTION, SOLUTION INTRA-ARTICULAR; INTRALESIONAL; INTRAMUSCULAR; INTRAVENOUS; SOFT TISSUE
Status: DISCONTINUED | OUTPATIENT
Start: 2019-02-08 | End: 2019-02-08

## 2019-02-08 RX ORDER — ONDANSETRON 2 MG/ML
INJECTION INTRAMUSCULAR; INTRAVENOUS
Status: DISCONTINUED | OUTPATIENT
Start: 2019-02-08 | End: 2019-02-08

## 2019-02-08 RX ORDER — BACITRACIN 50000 [IU]/1
INJECTION, POWDER, FOR SOLUTION INTRAMUSCULAR
Status: DISCONTINUED | OUTPATIENT
Start: 2019-02-08 | End: 2019-02-08 | Stop reason: HOSPADM

## 2019-02-08 RX ADMIN — FENTANYL CITRATE 25 MCG: 50 INJECTION INTRAMUSCULAR; INTRAVENOUS at 09:02

## 2019-02-08 RX ADMIN — DEXAMETHASONE SODIUM PHOSPHATE 4 MG: 4 INJECTION, SOLUTION INTRAMUSCULAR; INTRAVENOUS at 08:02

## 2019-02-08 RX ADMIN — GLYCOPYRROLATE 0.6 MG: 0.2 INJECTION, SOLUTION INTRAMUSCULAR; INTRAVENOUS at 09:02

## 2019-02-08 RX ADMIN — FENTANYL CITRATE 100 MCG: 50 INJECTION, SOLUTION INTRAMUSCULAR; INTRAVENOUS at 08:02

## 2019-02-08 RX ADMIN — NEOSTIGMINE METHYLSULFATE 5 MG: 1 INJECTION INTRAVENOUS at 09:02

## 2019-02-08 RX ADMIN — OXYCODONE AND ACETAMINOPHEN 1 TABLET: 5; 325 TABLET ORAL at 10:02

## 2019-02-08 RX ADMIN — LIDOCAINE HYDROCHLORIDE 80 MG: 20 INJECTION, SOLUTION INTRAVENOUS at 08:02

## 2019-02-08 RX ADMIN — ONDANSETRON 4 MG: 2 INJECTION INTRAMUSCULAR; INTRAVENOUS at 08:02

## 2019-02-08 RX ADMIN — ROCURONIUM BROMIDE 30 MG: 10 INJECTION, SOLUTION INTRAVENOUS at 08:02

## 2019-02-08 RX ADMIN — PROPOFOL 150 MG: 10 INJECTION, EMULSION INTRAVENOUS at 08:02

## 2019-02-08 RX ADMIN — CEFAZOLIN 1 G: 330 INJECTION, POWDER, FOR SOLUTION INTRAMUSCULAR; INTRAVENOUS at 08:02

## 2019-02-08 RX ADMIN — OXYCODONE HYDROCHLORIDE AND ACETAMINOPHEN 1 TABLET: 5; 325 TABLET ORAL at 10:02

## 2019-02-08 RX ADMIN — SODIUM CHLORIDE: 0.9 INJECTION, SOLUTION INTRAVENOUS at 07:02

## 2019-02-08 RX ADMIN — ACETAMINOPHEN 1000 MG: 10 INJECTION, SOLUTION INTRAVENOUS at 08:02

## 2019-02-08 NOTE — BRIEF OP NOTE
[facility]  Brief Operative Note     SUMMARY     Surgery Date: 2/8/2019     Surgeon(s) and Role:     * Magno Mishra MD - Primary    Assisting Surgeon: None    Pre-op Diagnosis:  Umbilical hernia without obstruction and without gangrene [K42.9]    Post-op Diagnosis:  Post-Op Diagnosis Codes:     * Umbilical hernia without obstruction and without gangrene [K42.9]    Procedure(s) (LRB):  REPAIR, HERNIA, UMBILICAL, AGE 5 YEARS OR OLDER, open with/without mesh (N/A)    Anesthesia: General    Description of the findings of the procedure: supraumbilical hernia partially reduced while intubated and sedated, identified and formerly reduced through open incision, omental adhesions cauterized, closed simple interrupted PDS with subcuticular 4-0 monocryl with mastizol, steristrips, telfa, tegaderm dressing     Findings/Key Components: supraumbilical fat containing hernia with small amount of bowel successfully reduced     Estimated Blood Loss: * No values recorded between 2/8/2019  8:39 AM and 2/8/2019  9:13 AM *         Specimens:   Specimen (12h ago, onward)    None          Discharge Note    SUMMARY     Admit Date: 2/8/2019    Discharge Date and Time:  02/08/2019 9:15 AM    Hospital Course (synopsis of major diagnoses, care, treatment, and services provided during the course of the hospital stay): Underwent outpatient umbilical hernia surgery, monitored in PACU and discharged home once criteria were met    Final Diagnosis: Post-Op Diagnosis Codes:     * Umbilical hernia without obstruction and without gangrene [K42.9]    Disposition: Home or Self Care    Follow Up/Patient Instructions:     Medications:  Reconciled Home Medications:      Medication List      START taking these medications    oxyCODONE-acetaminophen 5-325 mg per tablet  Commonly known as:  PERCOCET  Take 1 tablet by mouth every 4 (four) hours as needed for Pain.        CONTINUE taking these medications    aspirin 81 MG Chew  Take 81  mg by mouth every evening.     atorvastatin 40 MG tablet  Commonly known as:  LIPITOR  Take 40 mg by mouth once daily.     ergocalciferol 50,000 unit Cap  Commonly known as:  ERGOCALCIFEROL  every 7 days.     ibuprofen 800 MG tablet  Commonly known as:  ADVIL,MOTRIN     levETIRAcetam 500 MG Tab  Commonly known as:  KEPPRA  Take 1 tablet (500 mg total) by mouth 2 (two) times daily.     lisinopril 10 MG tablet  Take 1 tablet (10 mg total) by mouth once daily.     mirtazapine 15 MG tablet  Commonly known as:  REMERON  1/2-1 tab nightly for treatment of insomnia and to increase appetite     PROzac 40 MG capsule  Generic drug:  FLUoxetine  Take by mouth once daily.        STOP taking these medications    ALPRAZolam 0.5 MG tablet  Commonly known as:  XANAX          Discharge Procedure Orders   Diet Adult Regular     Notify your health care provider if you experience any of the following:  temperature >100.4     Notify your health care provider if you experience any of the following:  persistent nausea and vomiting or diarrhea     Notify your health care provider if you experience any of the following:  severe uncontrolled pain     Notify your health care provider if you experience any of the following:  redness, tenderness, or signs of infection (pain, swelling, redness, odor or green/yellow discharge around incision site)     Notify your health care provider if you experience any of the following:  difficulty breathing or increased cough     Notify your health care provider if you experience any of the following:  worsening rash     Weight bearing restrictions (specify):   Order Comments: Please no heavy lifting greater than 10 pounds for 4-6 weeks post-operatively.   You may shower and let water run over your incision sites, then pat the areas dry.   Please do not soak your incision in standing water or take baths.   You may resume a regular diet     Follow-up Information     Magno Mishra MD In 2 weeks.     Specialties:  General Surgery, Surgery  Why:  For wound re-check s/p umbo  Contact information:  Shaun DICKINSON  [facility] [id number] [phone]

## 2019-02-08 NOTE — INTERVAL H&P NOTE
The patient has been examined and the H&P has been reviewed:    No changes to H&P from 1/31/19    Anesthesia/Surgery risks, benefits and alternative options discussed and understood by patient/family.          Active Hospital Problems    Diagnosis  POA    Umbilical hernia [K42.9]  Yes      Resolved Hospital Problems   No resolved problems to display.

## 2019-02-08 NOTE — ANESTHESIA POSTPROCEDURE EVALUATION
"Anesthesia Post Evaluation    Patient: Gena Lui    Procedure(s) Performed: Procedure(s) (LRB):  REPAIR, HERNIA, UMBILICAL, AGE 5 YEARS OR OLDER, open with/without mesh (N/A)    Final Anesthesia Type: general  Patient location during evaluation: PACU  Patient participation: Yes- Able to Participate  Level of consciousness: awake and alert and oriented  Post-procedure vital signs: reviewed and stable  Pain management: adequate  Airway patency: patent  PONV status at discharge: No PONV  Anesthetic complications: no      Cardiovascular status: hemodynamically stable  Respiratory status: unassisted, spontaneous ventilation and room air  Hydration status: euvolemic  Follow-up not needed.        Visit Vitals  /76   Pulse 71   Temp 36.6 °C (97.9 °F) (Temporal)   Resp 18   Ht 4' 11" (1.499 m)   Wt 50.8 kg (112 lb)   LMP  (LMP Unknown)   SpO2 100%   Breastfeeding? No   BMI 22.62 kg/m²       Pain/Nir Score: Pain Rating Prior to Med Admin: 7 (2/8/2019 10:09 AM)  Nir Score: 10 (2/8/2019 10:30 AM)        "

## 2019-02-08 NOTE — PLAN OF CARE
Pt AAOx4. Complaints of moderate pain to abdomen. PRN PO and IV pain medication administered as ordered. Dressing with moderated drainage. VSS. Safety maintained. Tolerating sips of water. Pt to be discharged home per DOSC.

## 2019-02-08 NOTE — TRANSFER OF CARE
"Anesthesia Transfer of Care Note    Patient: Gena Lui    Procedure(s) Performed: Procedure(s) (LRB):  REPAIR, HERNIA, UMBILICAL, AGE 5 YEARS OR OLDER, open with/without mesh (N/A)    Patient location: PACU    Anesthesia Type: general    Transport from OR: Transported from OR on 6-10 L/min O2 by face mask with adequate spontaneous ventilation    Post pain: adequate analgesia    Post assessment: no apparent anesthetic complications    Post vital signs: stable    Level of consciousness: awake    Nausea/Vomiting: no nausea/vomiting    Complications: none    Transfer of care protocol was followed      Last vitals:   Visit Vitals  BP (!) 167/63 (BP Location: Left arm, Patient Position: Lying)   Pulse 96   Temp 36.4 °C (97.5 °F) (Temporal)   Resp 20   Ht 4' 11" (1.499 m)   Wt 50.8 kg (112 lb)   LMP  (LMP Unknown)   SpO2 98%   Breastfeeding? No   BMI 22.62 kg/m²     "

## 2019-02-08 NOTE — OP NOTE
DATE OF PROCEDURE:  02/08/2019.    PREOPERATIVE DIAGNOSIS:  Umbilical hernia.    POSTOPERATIVE DIAGNOSIS:  Umbilical hernia with incarceration.    PROCEDURE PERFORMED:  Repair of incarcerated umbilical hernia.    SURGEON:  Magno Mishra M.D.    ASSISTANT:  Nguyen.    ANESTHESIA:  General.    BLOOD LOSS:  Minimal.    COMPLICATIONS:  None.    INDICATIONS FOR PROCEDURE:  A 63-year-old with an increasingly painful umbilical   hernia.  At surgery, incarcerated omentum was identified within the hernia and   this was not able to be reduced even under anesthesia, clearly explaining the   discomfort experienced by the patient.    OPERATIVE REPORT IN DETAIL:  The patient was brought to the Operating Room,   placed in the supine position, and prepped and draped in sterile fashion once   satisfactory general anesthesia was induced.  A periumbilical incision was made.    Skin and subcutaneous tissues were divided.  The hernia sac was identified,   dissected back to its origin at the fascial level.  Hernia sac was opened.    Incarcerated omentum was dissected free and ultimately able to be reduced back   into the abdominal cavity.  Four interrupted 0-PDS sutures were used to repair   the fascial defect.  Hemostasis was evaluated and considered excellent.  The   skin edges were reapproximated with a running subcuticular 4-0 Monocryl stitch.    The patient tolerated the procedure well and remained stable throughout.      GF/HN  dd: 02/08/2019 09:00:00 (CST)  td: 02/08/2019 10:48:22 (CST)  Doc ID   #[id number]  Job ID #[id number]    CC:

## 2019-02-08 NOTE — PLAN OF CARE
Awake and alert. VSS. Denies nausea. Pain is tolerable. Tolerating liquids well.  DC instructions given to patient and family and they verbalize understanding.

## 2019-02-21 ENCOUNTER — OFFICE VISIT (OUTPATIENT)
Dept: SURGERY | Facility: CLINIC | Age: 64
End: 2019-02-21
Payer: MEDICARE

## 2019-02-21 VITALS
HEIGHT: 59 IN | WEIGHT: 113.19 LBS | SYSTOLIC BLOOD PRESSURE: 135 MMHG | BODY MASS INDEX: 22.82 KG/M2 | HEART RATE: 95 BPM | TEMPERATURE: 98 F | DIASTOLIC BLOOD PRESSURE: 74 MMHG

## 2019-02-21 DIAGNOSIS — K42.9 UMBILICAL HERNIA WITHOUT OBSTRUCTION AND WITHOUT GANGRENE: Primary | ICD-10-CM

## 2019-02-21 PROCEDURE — 99999 PR PBB SHADOW E&M-EST. PATIENT-LVL III: ICD-10-PCS | Mod: PBBFAC,,, | Performed by: SURGERY

## 2019-02-21 PROCEDURE — 99024 PR POST-OP FOLLOW-UP VISIT: ICD-10-PCS | Mod: S$GLB,,, | Performed by: SURGERY

## 2019-02-21 PROCEDURE — 99024 POSTOP FOLLOW-UP VISIT: CPT | Mod: S$GLB,,, | Performed by: SURGERY

## 2019-02-21 PROCEDURE — 99999 PR PBB SHADOW E&M-EST. PATIENT-LVL III: CPT | Mod: PBBFAC,,, | Performed by: SURGERY

## 2019-02-21 NOTE — PROGRESS NOTES
POST OP VISIT    SUBJECTIVE:  Gena Lui is a 63 y.o. female now s/p umbilical hernia repair 2/8/19. Doing well, no wound issues.     PHYSICAL EXAM:  Vitals:    02/21/19 1337   BP: 135/74   Pulse: 95   Temp: 97.6 °F (36.4 °C)       A&O, NAD  No Respiratory Distress  Incision well healing without erythema or drainage    INTERVAL PATHOLOGY:  None    ASSESSMENT & PLAN:  Gena Lui is a 63 y.o. female s/p UHR. Doing well without complication     - RTC PRN      Kaiden Dacosta MD   [phone]  General Surgery   [facility]     I have personally performed a detailed history and physical examination on this patient. My findings are summarized in the resident's note included in the record.

## 2019-03-18 DIAGNOSIS — F32.A ANXIETY AND DEPRESSION: ICD-10-CM

## 2019-03-18 DIAGNOSIS — F41.9 ANXIETY AND DEPRESSION: ICD-10-CM

## 2019-03-18 DIAGNOSIS — R45.851 SUICIDAL THOUGHTS: ICD-10-CM

## 2019-03-18 RX ORDER — FLUOXETINE HYDROCHLORIDE 40 MG/1
40 CAPSULE ORAL DAILY
Qty: 90 CAPSULE | Refills: 3 | Status: SHIPPED | OUTPATIENT
Start: 2019-03-18 | End: 2019-04-10 | Stop reason: SDUPTHER

## 2019-03-21 RX ORDER — ERGOCALCIFEROL 1.25 MG/1
50000 CAPSULE ORAL
Qty: 15 CAPSULE | Refills: 4 | Status: SHIPPED | OUTPATIENT
Start: 2019-03-21 | End: 2020-05-04

## 2019-03-21 RX ORDER — ATORVASTATIN CALCIUM 40 MG/1
40 TABLET, FILM COATED ORAL DAILY
Qty: 90 TABLET | Refills: 4 | Status: SHIPPED | OUTPATIENT
Start: 2019-03-21 | End: 2019-09-06 | Stop reason: DRUGHIGH

## 2019-03-21 NOTE — TELEPHONE ENCOUNTER
Called pt to inform her the prescriptions have been sent to pharmacy, she verbalized understanding.

## 2019-03-21 NOTE — TELEPHONE ENCOUNTER
----- Message from Miri Rider sent at 3/21/2019 12:43 PM CDT -----  Contact: Self [phone]  Patient would like to speak with you about her medication. Please advise

## 2019-04-02 ENCOUNTER — LAB VISIT (OUTPATIENT)
Dept: LAB | Facility: HOSPITAL | Age: 64
End: 2019-04-02
Attending: FAMILY MEDICINE
Payer: MEDICARE

## 2019-04-02 DIAGNOSIS — E55.9 VITAMIN D DEFICIENCY: ICD-10-CM

## 2019-04-02 DIAGNOSIS — M81.0 POSTMENOPAUSAL OSTEOPOROSIS: ICD-10-CM

## 2019-04-02 DIAGNOSIS — E78.2 MIXED HYPERLIPIDEMIA: ICD-10-CM

## 2019-04-02 DIAGNOSIS — Z79.899 MEDICATION MANAGEMENT: ICD-10-CM

## 2019-04-02 DIAGNOSIS — I10 BENIGN ESSENTIAL HYPERTENSION: ICD-10-CM

## 2019-04-02 LAB
25(OH)D3+25(OH)D2 SERPL-MCNC: 33 NG/ML (ref 30–96)
ALBUMIN SERPL BCP-MCNC: 3.8 G/DL (ref 3.5–5.2)
ALP SERPL-CCNC: 102 U/L (ref 55–135)
ALT SERPL W/O P-5'-P-CCNC: 31 U/L (ref 10–44)
ANION GAP SERPL CALC-SCNC: 9 MMOL/L (ref 8–16)
AST SERPL-CCNC: 36 U/L (ref 10–40)
BASOPHILS # BLD AUTO: 0.03 K/UL (ref 0–0.2)
BASOPHILS NFR BLD: 0.4 % (ref 0–1.9)
BILIRUB SERPL-MCNC: 0.2 MG/DL (ref 0.1–1)
BUN SERPL-MCNC: 21 MG/DL (ref 8–23)
CALCIUM SERPL-MCNC: 10.1 MG/DL (ref 8.7–10.5)
CHLORIDE SERPL-SCNC: 104 MMOL/L (ref 95–110)
CHOLEST SERPL-MCNC: 220 MG/DL (ref 120–199)
CHOLEST/HDLC SERPL: 7.1 {RATIO} (ref 2–5)
CO2 SERPL-SCNC: 25 MMOL/L (ref 23–29)
CREAT SERPL-MCNC: 0.9 MG/DL (ref 0.5–1.4)
DIFFERENTIAL METHOD: ABNORMAL
EOSINOPHIL # BLD AUTO: 0.2 K/UL (ref 0–0.5)
EOSINOPHIL NFR BLD: 2.2 % (ref 0–8)
ERYTHROCYTE [DISTWIDTH] IN BLOOD BY AUTOMATED COUNT: 13.6 % (ref 11.5–14.5)
EST. GFR  (AFRICAN AMERICAN): >60 ML/MIN/1.73 M^2
EST. GFR  (NON AFRICAN AMERICAN): >60 ML/MIN/1.73 M^2
ESTIMATED AVG GLUCOSE: 103 MG/DL (ref 68–131)
GLUCOSE SERPL-MCNC: 102 MG/DL (ref 70–110)
HBA1C MFR BLD HPLC: 5.2 % (ref 4–5.6)
HCT VFR BLD AUTO: 39.4 % (ref 37–48.5)
HDLC SERPL-MCNC: 31 MG/DL (ref 40–75)
HDLC SERPL: 14.1 % (ref 20–50)
HGB BLD-MCNC: 12.6 G/DL (ref 12–16)
LDLC SERPL CALC-MCNC: ABNORMAL MG/DL (ref 63–159)
LYMPHOCYTES # BLD AUTO: 2 K/UL (ref 1–4.8)
LYMPHOCYTES NFR BLD: 27.7 % (ref 18–48)
MCH RBC QN AUTO: 31.3 PG (ref 27–31)
MCHC RBC AUTO-ENTMCNC: 32 G/DL (ref 32–36)
MCV RBC AUTO: 98 FL (ref 82–98)
MONOCYTES # BLD AUTO: 0.4 K/UL (ref 0.3–1)
MONOCYTES NFR BLD: 4.9 % (ref 4–15)
NEUTROPHILS # BLD AUTO: 4.6 K/UL (ref 1.8–7.7)
NEUTROPHILS NFR BLD: 62.3 % (ref 38–73)
NONHDLC SERPL-MCNC: 189 MG/DL
PLATELET # BLD AUTO: 224 K/UL (ref 150–350)
PMV BLD AUTO: 10 FL (ref 9.2–12.9)
POTASSIUM SERPL-SCNC: 4.7 MMOL/L (ref 3.5–5.1)
PROT SERPL-MCNC: 7.6 G/DL (ref 6–8.4)
RBC # BLD AUTO: 4.03 M/UL (ref 4–5.4)
SODIUM SERPL-SCNC: 138 MMOL/L (ref 136–145)
T4 FREE SERPL-MCNC: 0.7 NG/DL (ref 0.71–1.51)
TRIGL SERPL-MCNC: 897 MG/DL (ref 30–150)
TSH SERPL DL<=0.005 MIU/L-ACNC: 8.46 UIU/ML (ref 0.4–4)
WBC # BLD AUTO: 7.34 K/UL (ref 3.9–12.7)

## 2019-04-02 PROCEDURE — 82306 VITAMIN D 25 HYDROXY: CPT

## 2019-04-02 PROCEDURE — 83036 HEMOGLOBIN GLYCOSYLATED A1C: CPT

## 2019-04-02 PROCEDURE — 36415 COLL VENOUS BLD VENIPUNCTURE: CPT

## 2019-04-02 PROCEDURE — 80053 COMPREHEN METABOLIC PANEL: CPT

## 2019-04-02 PROCEDURE — 84443 ASSAY THYROID STIM HORMONE: CPT

## 2019-04-02 PROCEDURE — 84439 ASSAY OF FREE THYROXINE: CPT

## 2019-04-02 PROCEDURE — 80061 LIPID PANEL: CPT

## 2019-04-02 PROCEDURE — 85025 COMPLETE CBC W/AUTO DIFF WBC: CPT

## 2019-04-05 PROBLEM — Z98.890 H/O UMBILICAL HERNIA REPAIR: Status: ACTIVE | Noted: 2019-02-08

## 2019-04-05 PROBLEM — R79.89 ABNORMAL TSH: Status: ACTIVE | Noted: 2019-04-05

## 2019-04-05 PROBLEM — Z87.19 H/O UMBILICAL HERNIA REPAIR: Status: ACTIVE | Noted: 2019-02-08

## 2019-04-08 ENCOUNTER — TELEPHONE (OUTPATIENT)
Dept: FAMILY MEDICINE | Facility: CLINIC | Age: 64
End: 2019-04-08

## 2019-04-08 NOTE — TELEPHONE ENCOUNTER
Left message requesting a callback.  Calling to verify patient is taking Lipitor.      Per Dr Flores: Gena canceled her annual appointment today at the last minute and I wanted to be sure that she rescheduled it especially since her thyroid levels are off and also her triglycerides have increased extremely significantly from 158-897.  Is she still taking her Lipitor 40 mg every day?  Please find out if she can pinpoint any significant diet/activity change such as increased soft drink consumption that may have contributed to this?  Her thyroid level being off could in some ways contribute but likely not this significantly.  We will definitely need to do some further thyroid testing.  Please let me know if she is still taking her cholesterol medication and ensure that her appointment for physical is rescheduled, thanks

## 2019-04-09 ENCOUNTER — TELEPHONE (OUTPATIENT)
Dept: FAMILY MEDICINE | Facility: CLINIC | Age: 64
End: 2019-04-09

## 2019-04-09 NOTE — TELEPHONE ENCOUNTER
I spoke with pt and reviewed results. I asked if she  Is taking the Lipitor, she said she has forgotten to taken it a few days in a row off and on. She feels as though her Prozac isn't working anymore. She said all she wants to do is sleep all day and she has no motivation to do anything. She said she can just sit and stare out in space all day. She said she has no energy to do anything. She is scheduled for tomorrow for her annual.

## 2019-04-09 NOTE — TELEPHONE ENCOUNTER
----- Message from Meghan Carbone sent at 4/8/2019  4:58 PM CDT -----  Contact: self, [phone]  Patient called in returning your call. Please advise.

## 2019-04-10 ENCOUNTER — OFFICE VISIT (OUTPATIENT)
Dept: FAMILY MEDICINE | Facility: CLINIC | Age: 64
End: 2019-04-10
Payer: MEDICARE

## 2019-04-10 VITALS
SYSTOLIC BLOOD PRESSURE: 116 MMHG | WEIGHT: 119.69 LBS | DIASTOLIC BLOOD PRESSURE: 72 MMHG | BODY MASS INDEX: 23.5 KG/M2 | OXYGEN SATURATION: 95 % | TEMPERATURE: 98 F | HEART RATE: 81 BPM | HEIGHT: 60 IN

## 2019-04-10 DIAGNOSIS — N76.1 SUBACUTE VAGINITIS: ICD-10-CM

## 2019-04-10 DIAGNOSIS — E55.9 VITAMIN D DEFICIENCY: ICD-10-CM

## 2019-04-10 DIAGNOSIS — Z11.3 SCREEN FOR STD (SEXUALLY TRANSMITTED DISEASE): ICD-10-CM

## 2019-04-10 DIAGNOSIS — E78.2 MIXED HYPERLIPIDEMIA: ICD-10-CM

## 2019-04-10 DIAGNOSIS — J30.2 SEASONAL ALLERGIC RHINITIS, UNSPECIFIED TRIGGER: ICD-10-CM

## 2019-04-10 DIAGNOSIS — R79.89 ABNORMAL TSH: ICD-10-CM

## 2019-04-10 DIAGNOSIS — M54.50 CHRONIC BILATERAL LOW BACK PAIN WITHOUT SCIATICA: ICD-10-CM

## 2019-04-10 DIAGNOSIS — Z86.73 HISTORY OF STROKE: ICD-10-CM

## 2019-04-10 DIAGNOSIS — Z11.4 SCREENING FOR HIV (HUMAN IMMUNODEFICIENCY VIRUS): ICD-10-CM

## 2019-04-10 DIAGNOSIS — G89.29 CHRONIC BILATERAL LOW BACK PAIN WITHOUT SCIATICA: ICD-10-CM

## 2019-04-10 DIAGNOSIS — I10 BENIGN ESSENTIAL HYPERTENSION: ICD-10-CM

## 2019-04-10 DIAGNOSIS — F10.11 HISTORY OF ALCOHOL ABUSE: ICD-10-CM

## 2019-04-10 DIAGNOSIS — M81.0 POSTMENOPAUSAL OSTEOPOROSIS: ICD-10-CM

## 2019-04-10 DIAGNOSIS — Z12.31 ENCOUNTER FOR SCREENING MAMMOGRAM FOR BREAST CANCER: ICD-10-CM

## 2019-04-10 DIAGNOSIS — Z98.890 H/O UMBILICAL HERNIA REPAIR: ICD-10-CM

## 2019-04-10 DIAGNOSIS — F41.9 ANXIETY AND DEPRESSION: ICD-10-CM

## 2019-04-10 DIAGNOSIS — Z87.19 H/O UMBILICAL HERNIA REPAIR: ICD-10-CM

## 2019-04-10 DIAGNOSIS — Z79.899 MEDICATION MANAGEMENT: ICD-10-CM

## 2019-04-10 DIAGNOSIS — Z72.0 TOBACCO ABUSE: ICD-10-CM

## 2019-04-10 DIAGNOSIS — F32.A ANXIETY AND DEPRESSION: ICD-10-CM

## 2019-04-10 DIAGNOSIS — E03.9 HYPOTHYROIDISM, UNSPECIFIED TYPE: ICD-10-CM

## 2019-04-10 DIAGNOSIS — Z79.82 LONG-TERM USE OF ASPIRIN THERAPY: ICD-10-CM

## 2019-04-10 DIAGNOSIS — Z01.419 ENCOUNTER FOR ROUTINE GYNECOLOGICAL EXAMINATION WITH PAPANICOLAOU SMEAR OF CERVIX: Primary | ICD-10-CM

## 2019-04-10 DIAGNOSIS — Z87.898 HISTORY OF SEIZURES: ICD-10-CM

## 2019-04-10 PROCEDURE — 87624 HPV HI-RISK TYP POOLED RSLT: CPT

## 2019-04-10 PROCEDURE — 99499 UNLISTED E&M SERVICE: CPT | Mod: S$GLB,,, | Performed by: FAMILY MEDICINE

## 2019-04-10 PROCEDURE — 99499 RISK ADDL DX/OHS AUDIT: ICD-10-PCS | Mod: S$GLB,,, | Performed by: FAMILY MEDICINE

## 2019-04-10 PROCEDURE — 3074F SYST BP LT 130 MM HG: CPT | Mod: CPTII,S$GLB,, | Performed by: FAMILY MEDICINE

## 2019-04-10 PROCEDURE — 99214 PR OFFICE/OUTPT VISIT, EST, LEVL IV, 30-39 MIN: ICD-10-PCS | Mod: 25,S$GLB,, | Performed by: FAMILY MEDICINE

## 2019-04-10 PROCEDURE — 99214 OFFICE O/P EST MOD 30 MIN: CPT | Mod: 25,S$GLB,, | Performed by: FAMILY MEDICINE

## 2019-04-10 PROCEDURE — 87480 CANDIDA DNA DIR PROBE: CPT

## 2019-04-10 PROCEDURE — 99999 PR PBB SHADOW E&M-EST. PATIENT-LVL IV: CPT | Mod: PBBFAC,,, | Performed by: FAMILY MEDICINE

## 2019-04-10 PROCEDURE — 87491 CHLMYD TRACH DNA AMP PROBE: CPT

## 2019-04-10 PROCEDURE — 87510 GARDNER VAG DNA DIR PROBE: CPT

## 2019-04-10 PROCEDURE — 99999 PR PBB SHADOW E&M-EST. PATIENT-LVL IV: ICD-10-PCS | Mod: PBBFAC,,, | Performed by: FAMILY MEDICINE

## 2019-04-10 PROCEDURE — 3078F DIAST BP <80 MM HG: CPT | Mod: CPTII,S$GLB,, | Performed by: FAMILY MEDICINE

## 2019-04-10 PROCEDURE — 3074F PR MOST RECENT SYSTOLIC BLOOD PRESSURE < 130 MM HG: ICD-10-PCS | Mod: CPTII,S$GLB,, | Performed by: FAMILY MEDICINE

## 2019-04-10 PROCEDURE — 88175 CYTOPATH C/V AUTO FLUID REDO: CPT

## 2019-04-10 PROCEDURE — 3078F PR MOST RECENT DIASTOLIC BLOOD PRESSURE < 80 MM HG: ICD-10-PCS | Mod: CPTII,S$GLB,, | Performed by: FAMILY MEDICINE

## 2019-04-10 RX ORDER — FLUTICASONE PROPIONATE 50 MCG
2 SPRAY, SUSPENSION (ML) NASAL DAILY
Qty: 16 G | Refills: 11 | Status: SHIPPED | OUTPATIENT
Start: 2019-04-10 | End: 2021-02-22

## 2019-04-10 NOTE — PROGRESS NOTES
" Office Visit    Patient Name: Gena Lui    : 1955  MRN: [id number]    Subjective:  Gena is a 63 y.o. female who presents today for:    Annual Exam      61 yo female patient of mine who presents today for annual exam and monitoring of chronic conditions that include hypertension, hyperlipidemia with hypertriglyceridemia (recently much worse with inconsistent use of Lipitor), history of seizures now controlled on Keppra, history of stroke, anxiety/depression, osteoporosis with vitamin-D deficiency.  She also has a heavy tobacco use history, and previous history of alcohol abuse-though the alcohol abuse has been much improved and she has not had any alcoholic drinks to speak of over the last couple of years-- only 3 oz glass of wine with dinner only. Smoking reduced to 5 cigarettes per day about.  Labs drawn prior to office visit on 2019 remarkable for TSH of 8.4 (was normal last year), triglycerides elevated to 897 with incalculable LDL.  Vitamin-D level has normalized on weekly supplementation, CBC, A1c, CMP unremarkable.  She is recently status post repair of umbilical hernia on 2019.      She is currently living alone (last year she was not able to live alone and was living with her grandchildren) but close to her grandchildren at her home in [facility].      She is postmenopausal.  She does not have a gynecologist and is overdue for pap.     They have been feeling overall well but does not feel as if her prozac is working as well as prior.  She has been taking 40 mg/ day and reports that it helps with anxiety but she has been more "suggish."  She is recovering well from her her hernia surgery and meeting postoperative milestones     General lifestyle habits are as follows:  Diet is described as poor-- eats one meals per day due to poor appetite, exercise is described as fair-- [place], sleep is described as improved on Remeron-- 8+ hours nightly. " Weight is improved to BMI of 22-- recently stable there.     Immunizations: TDaP 7/13/2014, FLU UTD, Pneumovax 23 10/5/2018(smoker), SHINGRIX completed 10/5/2018     Screening Tests: PAP/ HPV today 4/10/2019, mammogram 3/23/2018-- yearly repeat ordered, colonoscopy/19/2018-- repeat 5 years, hep C screening (-) 3/21/2018, DEXA  3/23/2018-- osteoporosis, PROLIA/VIT D stared and repeat 2 years      Eye/Dental: Eye 6/5/2018 Dr Briceno optometry, Dental DUE and trying to schedule      Past Medical History  Past Medical History:   Diagnosis Date    Anxiety and depression 3/21/2018    Benign essential hypertension 3/21/2018    Chronic bilateral low back pain without sciatica 3/21/2018    History of stroke 3/21/2018    Memory loss     Mixed hyperlipidemia 3/21/2018    Seizures     Tobacco abuse 3/21/2018       Past Surgical History  Past Surgical History:   Procedure Laterality Date    ANTERIOR VAGINAL REPAIR      CATARACT EXTRACTION Right 10/16/2007    Dr. Ramsey //yag 5/1/18 Dr. Bentley    CATARACT EXTRACTION W/  INTRAOCULAR LENS IMPLANT Left 10/30/2007    Dr. Ramsey// yag 5/1/18 Dr. Bentley    CHOLECYSTECTOMY      COLONOSCOPY N/A 4/19/2018    Performed [facility]calves [facility] at [facility]    EYE SURGERY      REPAIR, HERNIA, UMBILICAL, AGE 5 YEARS OR OLDER, open with/without mesh N/A 2/8/2019    Performed [facility] at [facility]    TONSILLECTOMY         Family History  Family History   Problem Relation Age of Onset    Blindness Neg Hx     Glaucoma Neg Hx     Macular degeneration Neg Hx     Retinal detachment Neg Hx        Social History  Social History     Socioeconomic History    Marital status:      Spouse name: Not on file    Number of children: Not on file    Years of education: Not on file    Highest education level: Not on file   Occupational History    Not on file   Social Needs    Financial resource strain: Not on file    Food insecurity:     Worry: Not on file      Inability: Not on file    Transportation needs:     Medical: Not on file     Non-medical: Not on file   Tobacco Use    Smoking status: Current Every Day Smoker     Types: Cigarettes    Smokeless tobacco: Never Used    Tobacco comment: 1 PPD   Substance and Sexual Activity    Alcohol use: Yes     Comment: 1-2 drinks daily    Drug use: Yes     Types: Marijuana     Comment: intermittent to take the edge off of her anxiety    Sexual activity: Never   Lifestyle    Physical activity:     Days per week: Not on file     Minutes per session: Not on file    Stress: Not on file   Relationships    Social connections:     Talks on phone: Not on file     Gets together: Not on file     Attends Shinto service: Not on file     Active member of club or organization: Not on file     Attends meetings of clubs or organizations: Not on file     Relationship status: Not on file   Other Topics Concern    Not on file   Social History Narrative    Not on file       Current Medications  Medications reviewed and updated.     Allergies   Review of patient's allergies indicates:   Allergen Reactions    Versed [midazolam]      IV Versed altered mental status       Review of Systems (Pertinent positives)  Review of Systems   Constitutional: Positive for fatigue. Negative for unexpected weight change.   HENT: Positive for congestion.    Eyes: Negative for visual disturbance.   Respiratory: Negative for shortness of breath.    Cardiovascular: Negative for chest pain and leg swelling.   Gastrointestinal: Negative for abdominal pain, blood in stool, constipation and diarrhea.   Genitourinary: Negative for dysuria, hematuria and vaginal bleeding.   Musculoskeletal: Positive for back pain.   Allergic/Immunologic: Positive for environmental allergies.   Neurological: Positive for weakness. Negative for dizziness, seizures, syncope, light-headedness and headaches.   Psychiatric/Behavioral: Positive for dysphoric mood. The patient is  "nervous/anxious (overall stable).        /72   Pulse 81   Temp 98.2 °F (36.8 °C) (Oral)   Ht 4' 11.75" (1.518 m)   Wt 54.3 kg (119 lb 11.4 oz)   LMP  (LMP Unknown)   SpO2 95%   BMI 23.58 kg/m²     Physical Exam   Constitutional: She is oriented to person, place, and time. She appears well-developed and well-nourished. No distress.   HENT:   Head: Normocephalic and atraumatic.   Right Ear: Ear canal normal. Tympanic membrane is not erythematous and not bulging.   Left Ear: Ear canal normal. Tympanic membrane is not erythematous and not bulging.   Mouth/Throat: No oropharyngeal exudate.   Eyes: Conjunctivae are normal.   Neck: Carotid bruit is not present. No thyroid mass and no thyromegaly present.   Cardiovascular: Normal rate, regular rhythm and normal heart sounds.   No murmur heard.  Pulses:       Dorsalis pedis pulses are 2+ on the right side, and 2+ on the left side.   Pulmonary/Chest: Breath sounds normal. No respiratory distress. Right breast exhibits no mass. Left breast exhibits no mass.   Abdominal: Soft. Bowel sounds are normal. She exhibits no distension and no mass. There is no hepatosplenomegaly. There is no tenderness.   Genitourinary: Vagina normal and uterus normal. Pelvic exam was performed with patient supine. There is no rash or lesion on the right labia. There is no rash or lesion on the left labia. Cervix exhibits no motion tenderness and no discharge. Right adnexum displays no mass and no tenderness. Left adnexum displays no mass and no tenderness.   Musculoskeletal: Normal range of motion.   Lymphadenopathy:     She has no cervical adenopathy.   Neurological: She is alert and oriented to person, place, and time.   Skin: No rash noted.   Psychiatric: She has a normal mood and affect.   Vitals reviewed.        Assessment/Plan:  Gena Lui is a 63 y.o. female who presents today for :    Gena was seen today for annual exam.    Diagnoses and all orders for this " visit:    Encounter for routine gynecological examination with Papanicolaou smear of cervix  Comments:  Health maintenance reviewed:  Pap/HPV/STD screening today, mammogram ordered, otherwise up-to-date.  Advised on diet/exercise, eye/dental exams  Orders:  -     Comprehensive metabolic panel; Future  -     Lipid panel; Future  -     TSH; Future  -     T4, free; Future  -     Thyroid peroxidase antibody; Future  -     Liquid-based pap smear, screening  -     HPV High Risk Genotypes, PCR  -     C. trachomatis/N. gonorrhoeae by AMP DNA  -     HIV 1 / 2 ANTIBODY; Future  -     RPR; Future  -     Hepatitis B surface antigen; Future  -     Mammo Digital Screening Bilat; Future  -     VAGINOSIS SCREEN BY DNA PROBE    BMI 22.0-22.9, adult    Benign essential hypertension  Comments:  Blood pressure is controlled on lisinopril 10 mg daily  Orders:  -     Comprehensive metabolic panel; Future  -     Lipid panel; Future  -     TSH; Future    Mixed hyperlipidemia  Comments:  Patient inconsistently taking Lipitor 40 mg lately, likely contributing to significant increase in triglycerides to the 800s.  Resume medication & recheck 3 mo  Orders:  -     Comprehensive metabolic panel; Future  -     Lipid panel; Future    History of stroke  Comments:  No new neurologic complaints, good blood pressure control stressed, advised on importance of compliance with statin and aspirin daily    Tobacco abuse  Comments:  Now open to smoking cessation services.  Referral placed  Orders:  -     Ambulatory referral to Smoking Cessation Program    History of alcohol abuse  Comments:  stickin gto just 1 3 oz glass of wine nightly     History of seizures  Comments:  Significant improvement in seizure control since resuming Keppra 500 mg twice daily, no recent episodes    Long-term use of aspirin therapy    Anxiety and depression  Comments:  trial of gettting back on PRozac daily and recheck labs like TSH in 8-12 weeks then reassess    Chronic  bilateral low back pain without sciatica    Postmenopausal osteoporosis  Comments:  Now on Prolia, calcium/vitamin-D, recheck bone density next year.    Vitamin D deficiency  Comments:  Vitamin-D level normalized on weekly 22873 IU supplement- advised to continue for the time being given significant osteoporosis and vitamin-D at low normal    Abnormal TSH  -     TSH; Future  -     T4, free; Future  -     Thyroid peroxidase antibody; Future    Hypothyroidism, unspecified type  -     TSH; Future  -     T4, free; Future  -     Thyroid peroxidase antibody; Future    H/O umbilical hernia repair    Medication management  -     Comprehensive metabolic panel; Future  -     Lipid panel; Future  -     TSH; Future  -     T4, free; Future  -     Thyroid peroxidase antibody; Future    Screen for STD (sexually transmitted disease)  -     C. trachomatis/N. gonorrhoeae by AMP DNA  -     HIV 1 / 2 ANTIBODY; Future  -     RPR; Future  -     Hepatitis B surface antigen; Future  -     VAGINOSIS SCREEN BY DNA PROBE    Encounter for screening mammogram for breast cancer  -     Mammo Digital Screening Bilat; Future    Subacute vaginitis  -     C. trachomatis/N. gonorrhoeae by AMP DNA  -     VAGINOSIS SCREEN BY DNA PROBE    Screening for HIV (human immunodeficiency virus)  -     HIV 1 / 2 ANTIBODY; Future    Seasonal allergic rhinitis, unspecified trigger    Other orders  -     fluticasone (FLONASE) 50 mcg/actuation nasal spray; 2 sprays (100 mcg total) by Each Nare route once daily.            ICD-10-CM ICD-9-CM    1. Encounter for routine gynecological examination with Papanicolaou smear of cervix Z01.419 V72.31 Comprehensive metabolic panel     V76.2 Lipid panel      TSH      T4, free      Thyroid peroxidase antibody      Liquid-based pap smear, screening      HPV High Risk Genotypes, PCR      C. trachomatis/N. gonorrhoeae by AMP DNA      HIV 1 / 2 ANTIBODY      RPR      Hepatitis B surface antigen      Mammo Digital Screening Bilat       VAGINOSIS SCREEN BY DNA PROBE    [facility] reviewed:  Pap/HPV/STD screening today, mammogram ordered, otherwise up-to-date.  Advised on diet/exercise, eye/dental exams   2. BMI 22.0-22.9, adult Z68.22 V85.1    3. Benign essential hypertension I10 401.1 Comprehensive metabolic panel      Lipid panel      TSH    Blood pressure is controlled on lisinopril 10 mg daily   4. Mixed hyperlipidemia E78.2 272.2 Comprehensive metabolic panel      Lipid panel    Patient inconsistently taking Lipitor 40 mg lately, likely contributing to significant increase in triglycerides to the 800s.  Resume medication & recheck 3 mo   5. History of stroke Z86.73 V12.54     No new neurologic complaints, good blood pressure control stressed, advised on importance of compliance with statin and aspirin daily   6. Tobacco abuse Z72.0 305.1 Ambulatory referral to Smoking Cessation Program    Now open to smoking cessation services.  Referral placed   7. History of alcohol abuse Z87.898 305.03     stickin gto just 1 3 oz glass of wine nightly    8. History of seizures Z87.898 V12.49     Significant improvement in seizure control since resuming Keppra 500 mg twice daily, no recent episodes   9. Long-term use of aspirin therapy Z79.82 V58.66    10. Anxiety and depression F41.9 300.00     F32.9 311     trial of gettting back on PRozac daily and recheck labs like TSH in 8-12 weeks then reassess   11. Chronic bilateral low back pain without sciatica M54.5 724.2     G89.29 338.29    12. Postmenopausal osteoporosis M81.0 733.01     Now on Prolia, calcium/vitamin-D, recheck bone density next year.   13. Vitamin D deficiency E55.9 268.9     Vitamin-D level normalized on weekly 85248 IU supplement- advised to continue for the time being given significant osteoporosis and vitamin-D at low normal   14. Abnormal TSH R79.89 790.6 TSH      T4, free      Thyroid peroxidase antibody   15. Hypothyroidism, unspecified type E03.9 244.9 TSH      T4, free       Thyroid peroxidase antibody   16. H/O umbilical hernia repair Z98.890 V15.29     Z87.19     17. Medication management Z79.899 V58.69 Comprehensive metabolic panel      Lipid panel      TSH      T4, free      Thyroid peroxidase antibody   18. Screen for STD (sexually transmitted disease) Z11.3 V74.5 C. trachomatis/N. gonorrhoeae by AMP DNA      HIV 1 / 2 ANTIBODY      RPR      Hepatitis B surface antigen      VAGINOSIS SCREEN BY DNA PROBE   19. Encounter for screening mammogram for breast cancer Z12.31 V76.12 Mammo Digital Screening Bilat   20. Subacute vaginitis N76.1 616.10 C. trachomatis/N. gonorrhoeae by AMP DNA      VAGINOSIS SCREEN BY DNA PROBE   21. Screening for HIV (human immunodeficiency virus) Z11.4 V73.89 HIV 1 / 2 ANTIBODY   22. Seasonal allergic rhinitis, unspecified trigger J30.2 477.9        Patient Instructions   Take all medications as prescribed every day and will reassess how you are doing in about 8-12 weeks with labs prior.  Will be assessing for normalization of thyroid function, improvement in mood with use of daily Prozac, improvement in triglycerides with Lipitor compliance.        Follow up in about 3 months (around 7/10/2019) for to follow up on lab results, return as needed for new concerns.

## 2019-04-10 NOTE — PATIENT INSTRUCTIONS
Take all medications as prescribed every day and will reassess how you are doing in about 8-12 weeks with labs prior.  Will be assessing for normalization of thyroid function, improvement in mood with use of daily Prozac, improvement in triglycerides with Lipitor compliance.

## 2019-04-11 LAB
C TRACH DNA SPEC QL NAA+PROBE: NOT DETECTED
N GONORRHOEA DNA SPEC QL NAA+PROBE: NOT DETECTED

## 2019-04-15 ENCOUNTER — HOSPITAL ENCOUNTER (OUTPATIENT)
Dept: RADIOLOGY | Facility: HOSPITAL | Age: 64
Discharge: HOME | End: 2019-04-15
Attending: FAMILY MEDICINE
Payer: MEDICARE

## 2019-04-15 DIAGNOSIS — Z12.31 ENCOUNTER FOR SCREENING MAMMOGRAM FOR BREAST CANCER: ICD-10-CM

## 2019-04-15 DIAGNOSIS — Z01.419 ENCOUNTER FOR ROUTINE GYNECOLOGICAL EXAMINATION WITH PAPANICOLAOU SMEAR OF CERVIX: ICD-10-CM

## 2019-04-15 LAB
HPV HR 12 DNA CVX QL NAA+PROBE: NEGATIVE
HPV16 AG SPEC QL: NEGATIVE
HPV18 DNA SPEC QL NAA+PROBE: POSITIVE

## 2019-04-15 PROCEDURE — 77067 SCR MAMMO BI INCL CAD: CPT | Mod: 26,,, | Performed by: RADIOLOGY

## 2019-04-15 PROCEDURE — 77063 BREAST TOMOSYNTHESIS BI: CPT | Mod: 26,,, | Performed by: RADIOLOGY

## 2019-04-15 PROCEDURE — 77067 SCR MAMMO BI INCL CAD: CPT | Mod: TC

## 2019-04-15 PROCEDURE — 77067 MAMMO DIGITAL SCREENING BILAT WITH TOMOSYNTHESIS_CAD: ICD-10-PCS | Mod: 26,,, | Performed by: RADIOLOGY

## 2019-04-15 PROCEDURE — 77063 MAMMO DIGITAL SCREENING BILAT WITH TOMOSYNTHESIS_CAD: ICD-10-PCS | Mod: 26,,, | Performed by: RADIOLOGY

## 2019-04-16 ENCOUNTER — PES CALL (OUTPATIENT)
Dept: ADMINISTRATIVE | Facility: CLINIC | Age: 64
End: 2019-04-16

## 2019-04-16 ENCOUNTER — TELEPHONE (OUTPATIENT)
Dept: FAMILY MEDICINE | Facility: CLINIC | Age: 64
End: 2019-04-16

## 2019-04-16 PROBLEM — R87.810 PAPANICOLAOU SMEAR OF CERVIX WITH POSITIVE HIGH RISK HUMAN PAPILLOMA VIRUS (HPV) TEST: Status: ACTIVE | Noted: 2019-04-16

## 2019-04-16 NOTE — TELEPHONE ENCOUNTER
"Called pt to review Dr. Bagley, left message requesting a call back.    "Please notify that while the Pap cells were normal, she does carry the HPV virus.  The strain that was isolated is a high risk strain for causing cellular changes associated with cervical cancer.  We will definitely need to repeat her Pap next year to see if she has cleared the virus.  If she does not clear it, then she will need to see gynecology for a biopsy, thank you"  "

## 2019-04-16 NOTE — TELEPHONE ENCOUNTER
----- Message from Cathleen Ragland MA sent at 4/16/2019  3:48 PM CDT -----      ----- Message -----  From: Rachelle Flores MD  Sent: 4/16/2019   1:28 PM  To: Cathleen Ragland MA    Please notify that while the Pap cells were normal, she does carry the HPV virus.  The strain that was isolated is a high risk strain for causing cellular changes associated with cervical cancer.  We will definitely need to repeat her Pap next year to see if she has cleared the virus.  If she does not clear it, then she will need to see gynecology for a biopsy, thank you

## 2019-04-23 ENCOUNTER — TELEPHONE (OUTPATIENT)
Dept: SMOKING CESSATION | Facility: CLINIC | Age: 64
End: 2019-04-23

## 2019-04-23 LAB
BACTERIAL VAGINOSIS DNA: DETECTED
CANDIDA GLABRATA DNA: NOT DETECTED
CANDIDA KRUSEI DNA: NOT DETECTED
CANDIDA RRNA VAG QL PROBE: DETECTED
T VAGINALIS RRNA GENITAL QL PROBE: NOT DETECTED

## 2019-04-25 ENCOUNTER — TELEPHONE (OUTPATIENT)
Dept: FAMILY MEDICINE | Facility: CLINIC | Age: 64
End: 2019-04-25

## 2019-04-25 DIAGNOSIS — B37.31 VAGINAL YEAST INFECTION: ICD-10-CM

## 2019-04-25 DIAGNOSIS — N76.0 BACTERIAL VAGINOSIS: ICD-10-CM

## 2019-04-25 DIAGNOSIS — B96.89 BACTERIAL VAGINOSIS: ICD-10-CM

## 2019-04-25 RX ORDER — FLUCONAZOLE 150 MG/1
150 TABLET ORAL ONCE
Qty: 1 TABLET | Refills: 1 | Status: SHIPPED | OUTPATIENT
Start: 2019-04-25 | End: 2019-04-27

## 2019-04-25 RX ORDER — METRONIDAZOLE 500 MG/1
500 TABLET ORAL EVERY 12 HOURS
Qty: 14 TABLET | Refills: 0 | Status: SHIPPED | OUTPATIENT
Start: 2019-04-25 | End: 2019-05-03

## 2019-04-25 NOTE — TELEPHONE ENCOUNTER
Patient advised that her affirm did come back showing BV and yeast infection.  Advised that these are bacterial imbalances and NOT stds.  Patient advised that prescription was sent to pharmacy.  Patient verbalized udnerstanding

## 2019-04-25 NOTE — TELEPHONE ENCOUNTER
----- Message from Lizeth Trinh sent at 4/25/2019  3:11 PM CDT -----  No. [phone]   Patient returned your call.

## 2019-04-25 NOTE — TELEPHONE ENCOUNTER
Please notify that Affirm shows both BV and yeast infection    I have sent diflucan pill x1 for yeast and Flagyl for 7 days to treat BV-- both bacterial imbalances and not STDs-- std testing negative. RXs sent to [facility] thanks

## 2019-04-29 ENCOUNTER — CLINICAL SUPPORT (OUTPATIENT)
Dept: SMOKING CESSATION | Facility: CLINIC | Age: 64
End: 2019-04-29
Payer: COMMERCIAL

## 2019-04-29 VITALS
HEART RATE: 93 BPM | DIASTOLIC BLOOD PRESSURE: 84 MMHG | WEIGHT: 119 LBS | HEIGHT: 59 IN | SYSTOLIC BLOOD PRESSURE: 125 MMHG | BODY MASS INDEX: 23.99 KG/M2

## 2019-04-29 DIAGNOSIS — F17.210 MODERATE CIGARETTE SMOKER (10-19 PER DAY): Primary | ICD-10-CM

## 2019-04-29 PROCEDURE — 99404 PREV MED CNSL INDIV APPRX 60: CPT | Mod: S$GLB,,,

## 2019-04-29 PROCEDURE — 99999 PR PBB SHADOW E&M-EST. PATIENT-LVL II: ICD-10-PCS | Mod: PBBFAC,,,

## 2019-04-29 PROCEDURE — 99404 PR PREVENT COUNSEL,INDIV,60 MIN: ICD-10-PCS | Mod: S$GLB,,,

## 2019-04-29 PROCEDURE — 99999 PR PBB SHADOW E&M-EST. PATIENT-LVL II: CPT | Mod: PBBFAC,,,

## 2019-04-29 RX ORDER — IBUPROFEN 200 MG
1 TABLET ORAL DAILY
Qty: 14 PATCH | Refills: 0 | Status: SHIPPED | OUTPATIENT
Start: 2019-04-29 | End: 2019-09-06

## 2019-04-29 RX ORDER — DM/P-EPHED/ACETAMINOPH/DOXYLAM 30-7.5/3
2 LIQUID (ML) ORAL
Qty: 108 LOZENGE | Refills: 0 | Status: SHIPPED | OUTPATIENT
Start: 2019-04-29 | End: 2019-09-06

## 2019-04-29 NOTE — Clinical Note
CESD of 9 is preceived as no mental distress or depression at this time. FTND of 3 indicates a low level of tobacco dependency. CO 12 ppm.

## 2019-05-06 ENCOUNTER — CLINICAL SUPPORT (OUTPATIENT)
Dept: SMOKING CESSATION | Facility: CLINIC | Age: 64
End: 2019-05-06
Payer: COMMERCIAL

## 2019-05-06 DIAGNOSIS — F17.210 CIGARETTE NICOTINE DEPENDENCE, UNCOMPLICATED: Primary | ICD-10-CM

## 2019-05-06 PROCEDURE — 99999 PR PBB SHADOW E&M-EST. PATIENT-LVL I: CPT | Mod: PBBFAC,,,

## 2019-05-06 PROCEDURE — 99404 PREV MED CNSL INDIV APPRX 60: CPT | Mod: S$GLB,,,

## 2019-05-06 PROCEDURE — 99999 PR PBB SHADOW E&M-EST. PATIENT-LVL I: ICD-10-PCS | Mod: PBBFAC,,,

## 2019-05-06 PROCEDURE — 99404 PR PREVENT COUNSEL,INDIV,60 MIN: ICD-10-PCS | Mod: S$GLB,,,

## 2019-05-06 NOTE — Clinical Note
Just a note to advise how the patient is progressing in the tobacco cessation program.  Patient states she's tobacco free as of 5/5/19. She reports to not using the 14 mg patches or 2 mg lozenges. Patient has both medications if she needs it she will use, but feels like she can do it on her own. Commended patient on her diligence. Patient states she's using her fidgets and they are helping.  She is watching not to snack too much. CO 1 ppm (<6 is non-smoker) Completion of TCRS (Tobacco Cessation Rating Scale) reviewed strategies, cues, and triggers. Introduced the negative impact of tobacco on health, the health advantages of discontinuing the use of tobacco, time line improved health changes after a quit, withdrawal issues to expect from nicotine and habit, and ways to achieve the goal of a quit. The patient denies any abnormal behavioral or mental changes at this time. The patient will continue with group therapy sessions and medication monitoring by CTTS.

## 2019-05-06 NOTE — PROGRESS NOTES
Individual Follow-Up Form    5/6/2019    Quit Date: 5/5/19    Clinical Status of Patient: Outpatient    Length of Service: 60 minutes    Continuing Medication: No    Other Medications:    Target Symptoms: Withdrawal and medication side effects. The following were  rated moderate (3) to severe (4) on TCRS:  · Moderate (3): Desire or Crave Tobacco  · Severe (4): None    Comments: Patient states she's tobacco free as of 5/5/19. She reports to not using the 14 mg patches or 2 mg lozenges. Patient has both medications if she needs it she will use, but feels like she can do it on her own. Commended patient on her diligence. Patient states she's using her fidgets and they are helping.  She is watching not to snack too much. CO 1 ppm (<6 is non-smoker) Completion of TCRS (Tobacco Cessation Rating Scale) reviewed strategies, cues, and triggers. Introduced the negative impact of tobacco on health, the health advantages of discontinuing the use of tobacco, time line improved health changes after a quit, withdrawal issues to expect from nicotine and habit, and ways to achieve the goal of a quit. The patient denies any abnormal behavioral or mental changes at this time. The patient will continue with group therapy sessions and medication monitoring by CTTS. Prescribed medication management will be by physician.     Diagnosis: F17.210    Next Visit: 1 week

## 2019-05-13 ENCOUNTER — CLINICAL SUPPORT (OUTPATIENT)
Dept: SMOKING CESSATION | Facility: CLINIC | Age: 64
End: 2019-05-13
Payer: COMMERCIAL

## 2019-05-13 DIAGNOSIS — F17.210 CIGARETTE NICOTINE DEPENDENCE, UNCOMPLICATED: Primary | ICD-10-CM

## 2019-05-13 PROCEDURE — 99999 PR PBB SHADOW E&M-EST. PATIENT-LVL I: CPT | Mod: PBBFAC,,,

## 2019-05-13 PROCEDURE — 99999 PR PBB SHADOW E&M-EST. PATIENT-LVL I: ICD-10-PCS | Mod: PBBFAC,,,

## 2019-05-13 PROCEDURE — 99404 PR PREVENT COUNSEL,INDIV,60 MIN: ICD-10-PCS | Mod: S$GLB,,,

## 2019-05-13 PROCEDURE — 99404 PREV MED CNSL INDIV APPRX 60: CPT | Mod: S$GLB,,,

## 2019-05-13 NOTE — Clinical Note
Just a note to advise how the patient is progressing in the tobacco cessation program.  Patient remains tobacco free as of 5/5/2019. The patient is not on any cessation medications currently. completion of TCRS (Tobacco Cessation Rating Scale) reviewed strategies, habitual behavior, high risks situations, understanding urges and cravings, stress and relaxation with open discussion and additional interventions, Introduced lapses, relapses, understanding them and analyzing the situation of a lapse, conflict issues that may be linked to a lapse. The patient denies any abnormal behavioral or mental changes at this time. The patient will continue with group therapy sessions and medication monitoring by CTTS. Prescribed medication management will be by physician. CO 0 ppm (6< is a non-smoker) Patient to follow up in 2 weeks.

## 2019-05-13 NOTE — PROGRESS NOTES
Individual Follow-Up Form    5/13/2019    Quit Date: 5/5/2019    Clinical Status of Patient: Outpatient    Length of Service: 60 minutes    Continuing Medication: no    Other Medications:      Target Symptoms: Withdrawal and medication side effects. The following were  rated moderate (3) to severe (4) on TCRS:  · Moderate (3): Desire or Crave Tobacco   · Severe (4): None    Comments: Patient remains tobacco free as of 5/5/2019. The patient is not on any cessation medications currently. completion of TCRS (Tobacco Cessation Rating Scale) reviewed strategies, habitual behavior, high risks situations, understanding urges and cravings, stress and relaxation with open discussion and additional interventions, Introduced lapses, relapses, understanding them and analyzing the situation of a lapse, conflict issues that may be linked to a lapse. The patient denies any abnormal behavioral or mental changes at this time. The patient will continue with group therapy sessions and medication monitoring by CTTS. Prescribed medication management will be by physician. CO 0 ppm (6< is a non-smoker) Patient to follow up in 2 weeks.     Diagnosis: F17.210    Next Visit: 2 weeks

## 2019-05-15 RX ORDER — ALPRAZOLAM 0.5 MG/1
TABLET ORAL
Qty: 30 TABLET | Refills: 5 | Status: SHIPPED | OUTPATIENT
Start: 2019-05-15 | End: 2019-12-27

## 2019-06-06 ENCOUNTER — TELEPHONE (OUTPATIENT)
Dept: SMOKING CESSATION | Facility: CLINIC | Age: 64
End: 2019-06-06

## 2019-06-06 NOTE — TELEPHONE ENCOUNTER
Left a message regarding a missed appointment and gave my  Number to call and reschedule. [phone].

## 2019-06-10 ENCOUNTER — LAB VISIT (OUTPATIENT)
Dept: LAB | Facility: HOSPITAL | Age: 64
End: 2019-06-10
Attending: FAMILY MEDICINE
Payer: MEDICARE

## 2019-06-10 DIAGNOSIS — Z11.4 SCREENING FOR HIV (HUMAN IMMUNODEFICIENCY VIRUS): ICD-10-CM

## 2019-06-10 DIAGNOSIS — Z79.899 MEDICATION MANAGEMENT: ICD-10-CM

## 2019-06-10 DIAGNOSIS — E03.9 HYPOTHYROIDISM, UNSPECIFIED TYPE: ICD-10-CM

## 2019-06-10 DIAGNOSIS — Z11.3 SCREEN FOR STD (SEXUALLY TRANSMITTED DISEASE): ICD-10-CM

## 2019-06-10 DIAGNOSIS — Z01.419 ENCOUNTER FOR ROUTINE GYNECOLOGICAL EXAMINATION WITH PAPANICOLAOU SMEAR OF CERVIX: ICD-10-CM

## 2019-06-10 DIAGNOSIS — R79.89 ABNORMAL TSH: ICD-10-CM

## 2019-06-10 DIAGNOSIS — E78.2 MIXED HYPERLIPIDEMIA: ICD-10-CM

## 2019-06-10 DIAGNOSIS — I10 BENIGN ESSENTIAL HYPERTENSION: ICD-10-CM

## 2019-06-10 LAB
ALBUMIN SERPL BCP-MCNC: 4 G/DL (ref 3.5–5.2)
ALP SERPL-CCNC: 103 U/L (ref 55–135)
ALT SERPL W/O P-5'-P-CCNC: 50 U/L (ref 10–44)
ANION GAP SERPL CALC-SCNC: 10 MMOL/L (ref 8–16)
AST SERPL-CCNC: 38 U/L (ref 10–40)
BILIRUB SERPL-MCNC: 0.6 MG/DL (ref 0.1–1)
BUN SERPL-MCNC: 23 MG/DL (ref 8–23)
CALCIUM SERPL-MCNC: 10.3 MG/DL (ref 8.7–10.5)
CHLORIDE SERPL-SCNC: 104 MMOL/L (ref 95–110)
CHOLEST SERPL-MCNC: 209 MG/DL (ref 120–199)
CHOLEST/HDLC SERPL: 5.8 {RATIO} (ref 2–5)
CO2 SERPL-SCNC: 24 MMOL/L (ref 23–29)
CREAT SERPL-MCNC: 1 MG/DL (ref 0.5–1.4)
EST. GFR  (AFRICAN AMERICAN): >60 ML/MIN/1.73 M^2
EST. GFR  (NON AFRICAN AMERICAN): >60 ML/MIN/1.73 M^2
GLUCOSE SERPL-MCNC: 94 MG/DL (ref 70–110)
HBV SURFACE AG SERPL QL IA: NEGATIVE
HDLC SERPL-MCNC: 36 MG/DL (ref 40–75)
HDLC SERPL: 17.2 % (ref 20–50)
HIV 1+2 AB+HIV1 P24 AG SERPL QL IA: NEGATIVE
LDLC SERPL CALC-MCNC: ABNORMAL MG/DL (ref 63–159)
NONHDLC SERPL-MCNC: 173 MG/DL
POTASSIUM SERPL-SCNC: 4.4 MMOL/L (ref 3.5–5.1)
PROT SERPL-MCNC: 7.9 G/DL (ref 6–8.4)
SODIUM SERPL-SCNC: 138 MMOL/L (ref 136–145)
T4 FREE SERPL-MCNC: 0.77 NG/DL (ref 0.71–1.51)
THYROPEROXIDASE IGG SERPL-ACNC: <6 IU/ML
TRIGL SERPL-MCNC: 588 MG/DL (ref 30–150)
TSH SERPL DL<=0.005 MIU/L-ACNC: 5.72 UIU/ML (ref 0.4–4)

## 2019-06-10 PROCEDURE — 80053 COMPREHEN METABOLIC PANEL: CPT

## 2019-06-10 PROCEDURE — 86376 MICROSOMAL ANTIBODY EACH: CPT

## 2019-06-10 PROCEDURE — 36415 COLL VENOUS BLD VENIPUNCTURE: CPT

## 2019-06-10 PROCEDURE — 84443 ASSAY THYROID STIM HORMONE: CPT

## 2019-06-10 PROCEDURE — 86592 SYPHILIS TEST NON-TREP QUAL: CPT

## 2019-06-10 PROCEDURE — 87340 HEPATITIS B SURFACE AG IA: CPT

## 2019-06-10 PROCEDURE — 86703 HIV-1/HIV-2 1 RESULT ANTBDY: CPT

## 2019-06-10 PROCEDURE — 84439 ASSAY OF FREE THYROXINE: CPT

## 2019-06-10 PROCEDURE — 80061 LIPID PANEL: CPT

## 2019-06-11 LAB — RPR SER QL: NORMAL

## 2019-06-14 ENCOUNTER — TELEPHONE (OUTPATIENT)
Dept: FAMILY MEDICINE | Facility: CLINIC | Age: 64
End: 2019-06-14

## 2019-06-14 NOTE — TELEPHONE ENCOUNTER
----- Message from Rachelle Flores MD sent at 6/13/2019  9:42 PM CDT -----  Please notify that labs are stable to improved from previous except for a mild elevation in 1 of the liver enzymes-- this can be related to poor diet/ alcohol/ medications/ a virus/ basically many potential causes. Because the elevation is mild we will just plan to order a recheck to see if it improves-- she has a follow up with me in a few weeks.  Her triglycerides are still elevated at 588 but this is much better than the 897 they previously were a couple of months ago. Also, thyroid levels are improved. She should try to eat healthy and get some exercise like walking and I will discuss results with her at her upcoming office visit. Otherwise her results look good.

## 2019-06-14 NOTE — TELEPHONE ENCOUNTER
"Called to let pt know "Please notify that labs are stable to improved from previous except for a mild elevation in 1 of the liver enzymes-- this can be related to poor diet/ alcohol/ medications/ a virus/ basically many potential causes. Because the elevation is mild we will just plan to order a recheck to see if it improves-- she has a follow up with me in a few weeks.  Her triglycerides are still elevated at 588 but this is much better than the 897 they previously were a couple of months ago. Also, thyroid levels are improved. She should try to eat healthy and get some exercise like walking and I will discuss results with her at her upcoming office visit. Otherwise her results look good." Left message requesting a call back.  "

## 2019-06-18 ENCOUNTER — CLINICAL SUPPORT (OUTPATIENT)
Dept: SMOKING CESSATION | Facility: CLINIC | Age: 64
End: 2019-06-18
Payer: COMMERCIAL

## 2019-06-18 ENCOUNTER — TELEPHONE (OUTPATIENT)
Dept: SMOKING CESSATION | Facility: CLINIC | Age: 64
End: 2019-06-18

## 2019-06-18 DIAGNOSIS — F17.210 CIGARETTE NICOTINE DEPENDENCE, UNCOMPLICATED: Primary | ICD-10-CM

## 2019-06-18 PROCEDURE — 99999 PR PBB SHADOW E&M-EST. PATIENT-LVL I: CPT | Mod: PBBFAC,,,

## 2019-06-18 PROCEDURE — 99407 BEHAV CHNG SMOKING > 10 MIN: CPT | Mod: S$GLB,,,

## 2019-06-18 PROCEDURE — 99999 PR PBB SHADOW E&M-EST. PATIENT-LVL I: ICD-10-PCS | Mod: PBBFAC,,,

## 2019-06-18 PROCEDURE — 99407 PR TOBACCO USE CESSATION INTENSIVE >10 MINUTES: ICD-10-PCS | Mod: S$GLB,,,

## 2019-06-18 NOTE — TELEPHONE ENCOUNTER
Left a message regarding a missed appointment and gave my number to call and reschedule [phone]

## 2019-08-27 ENCOUNTER — TELEPHONE (OUTPATIENT)
Dept: FAMILY MEDICINE | Facility: CLINIC | Age: 64
End: 2019-08-27

## 2019-08-27 NOTE — TELEPHONE ENCOUNTER
----- Message from Brynn Oliveira sent at 8/27/2019 11:32 AM CDT -----  Contact: Pt  Pt missed a call from the Dr's Office and would like a return call from the nurse at [phone]

## 2019-08-27 NOTE — TELEPHONE ENCOUNTER
Returned pt phone call, scheduled her for September 6th at 9:20AM.       Pt also stated that she stopped taking her Remeron, she said she weaned herself off of it but it is making her sleep too much, she stated she is also missing days, she said she thought that today was Monday and on her way here thinking it was her appt day her granddaughter corrected her and let her know that today is Tuesday. Do you have any recommendations?

## 2019-08-27 NOTE — TELEPHONE ENCOUNTER
----- Message from Shruthi Chang sent at 8/27/2019 10:30 AM CDT -----  Contact: patient  Type:  Sooner Apoointment Request    Caller is requesting a sooner appointment.  Caller declined first available appointment listed below.  Caller will not accept being placed on the waitlist and is requesting a message be sent to doctor.  Name of Caller: Gena   When is the first available appointment? 09/25/19  Symptoms: possible seizure activity/forgot yesterday's appointment  Would the patient rather a call back or a [facility]?  Call back  Best Call Back Number: [phone]  Additional Information: not sure if she has had one but needs to be seen as soon as possible

## 2019-08-27 NOTE — TELEPHONE ENCOUNTER
Called pt to get her an appt scheduled, someone answered and hung up, called a second time, no answer left a message requesting a call back.

## 2019-08-28 ENCOUNTER — TELEPHONE (OUTPATIENT)
Dept: FAMILY MEDICINE | Facility: CLINIC | Age: 64
End: 2019-08-28

## 2019-08-28 NOTE — TELEPHONE ENCOUNTER
"Let 's discuss further at her upcoming office visit. If she feels that the REmeron helps regulate her sleep but it was "too much" she can try cutting the pill I prescribed in 1/2.   "

## 2019-08-28 NOTE — TELEPHONE ENCOUNTER
Returned pt phone call, pt stated that the only thing she felt like it was helping was to increase her appetite.  She stated that she will talk to dr tompkins at her appt coming up.

## 2019-09-06 ENCOUNTER — OFFICE VISIT (OUTPATIENT)
Dept: FAMILY MEDICINE | Facility: CLINIC | Age: 64
End: 2019-09-06
Payer: MEDICARE

## 2019-09-06 VITALS
WEIGHT: 123 LBS | HEART RATE: 80 BPM | DIASTOLIC BLOOD PRESSURE: 76 MMHG | TEMPERATURE: 98 F | HEIGHT: 59 IN | BODY MASS INDEX: 24.8 KG/M2 | SYSTOLIC BLOOD PRESSURE: 116 MMHG | OXYGEN SATURATION: 96 %

## 2019-09-06 DIAGNOSIS — F10.11 HISTORY OF ALCOHOL ABUSE: ICD-10-CM

## 2019-09-06 DIAGNOSIS — F41.9 ANXIETY AND DEPRESSION: ICD-10-CM

## 2019-09-06 DIAGNOSIS — E78.2 MIXED HYPERLIPIDEMIA: Primary | ICD-10-CM

## 2019-09-06 DIAGNOSIS — Z79.82 LONG-TERM USE OF ASPIRIN THERAPY: ICD-10-CM

## 2019-09-06 DIAGNOSIS — Z23 NEEDS FLU SHOT: ICD-10-CM

## 2019-09-06 DIAGNOSIS — I10 BENIGN ESSENTIAL HYPERTENSION: ICD-10-CM

## 2019-09-06 DIAGNOSIS — M81.0 POSTMENOPAUSAL OSTEOPOROSIS: ICD-10-CM

## 2019-09-06 DIAGNOSIS — Z87.891 FORMER SMOKER: ICD-10-CM

## 2019-09-06 DIAGNOSIS — R87.810 PAPANICOLAOU SMEAR OF CERVIX WITH POSITIVE HIGH RISK HUMAN PAPILLOMA VIRUS (HPV) TEST: ICD-10-CM

## 2019-09-06 DIAGNOSIS — Z86.73 HISTORY OF STROKE: ICD-10-CM

## 2019-09-06 DIAGNOSIS — Z87.898 HISTORY OF SEIZURES: ICD-10-CM

## 2019-09-06 DIAGNOSIS — F32.A ANXIETY AND DEPRESSION: ICD-10-CM

## 2019-09-06 DIAGNOSIS — Z79.899 MEDICATION MANAGEMENT: ICD-10-CM

## 2019-09-06 DIAGNOSIS — E55.9 VITAMIN D DEFICIENCY: ICD-10-CM

## 2019-09-06 DIAGNOSIS — R79.89 ABNORMAL TSH: ICD-10-CM

## 2019-09-06 PROCEDURE — 99214 PR OFFICE/OUTPT VISIT, EST, LEVL IV, 30-39 MIN: ICD-10-PCS | Mod: 25,S$GLB,, | Performed by: FAMILY MEDICINE

## 2019-09-06 PROCEDURE — 99214 OFFICE O/P EST MOD 30 MIN: CPT | Mod: 25,S$GLB,, | Performed by: FAMILY MEDICINE

## 2019-09-06 PROCEDURE — 99999 PR PBB SHADOW E&M-EST. PATIENT-LVL III: CPT | Mod: PBBFAC,,, | Performed by: FAMILY MEDICINE

## 2019-09-06 PROCEDURE — 3074F PR MOST RECENT SYSTOLIC BLOOD PRESSURE < 130 MM HG: ICD-10-PCS | Mod: CPTII,S$GLB,, | Performed by: FAMILY MEDICINE

## 2019-09-06 PROCEDURE — 99499 UNLISTED E&M SERVICE: CPT | Mod: S$GLB,,, | Performed by: FAMILY MEDICINE

## 2019-09-06 PROCEDURE — 3008F PR BODY MASS INDEX (BMI) DOCUMENTED: ICD-10-PCS | Mod: CPTII,S$GLB,, | Performed by: FAMILY MEDICINE

## 2019-09-06 PROCEDURE — 90686 IIV4 VACC NO PRSV 0.5 ML IM: CPT | Mod: S$GLB,,, | Performed by: FAMILY MEDICINE

## 2019-09-06 PROCEDURE — 3074F SYST BP LT 130 MM HG: CPT | Mod: CPTII,S$GLB,, | Performed by: FAMILY MEDICINE

## 2019-09-06 PROCEDURE — G0008 FLU VACCINE (QUAD) GREATER THAN OR EQUAL TO 3YO PRESERVATIVE FREE IM: ICD-10-PCS | Mod: S$GLB,,, | Performed by: FAMILY MEDICINE

## 2019-09-06 PROCEDURE — 3008F BODY MASS INDEX DOCD: CPT | Mod: CPTII,S$GLB,, | Performed by: FAMILY MEDICINE

## 2019-09-06 PROCEDURE — 90686 FLU VACCINE (QUAD) GREATER THAN OR EQUAL TO 3YO PRESERVATIVE FREE IM: ICD-10-PCS | Mod: S$GLB,,, | Performed by: FAMILY MEDICINE

## 2019-09-06 PROCEDURE — 99499 RISK ADDL DX/OHS AUDIT: ICD-10-PCS | Mod: S$GLB,,, | Performed by: FAMILY MEDICINE

## 2019-09-06 PROCEDURE — 3078F DIAST BP <80 MM HG: CPT | Mod: CPTII,S$GLB,, | Performed by: FAMILY MEDICINE

## 2019-09-06 PROCEDURE — G0008 ADMIN INFLUENZA VIRUS VAC: HCPCS | Mod: S$GLB,,, | Performed by: FAMILY MEDICINE

## 2019-09-06 PROCEDURE — 3078F PR MOST RECENT DIASTOLIC BLOOD PRESSURE < 80 MM HG: ICD-10-PCS | Mod: CPTII,S$GLB,, | Performed by: FAMILY MEDICINE

## 2019-09-06 PROCEDURE — 99999 PR PBB SHADOW E&M-EST. PATIENT-LVL III: ICD-10-PCS | Mod: PBBFAC,,, | Performed by: FAMILY MEDICINE

## 2019-09-06 RX ORDER — ATORVASTATIN CALCIUM 80 MG/1
80 TABLET, FILM COATED ORAL DAILY
Qty: 90 TABLET | Refills: 3 | Status: SHIPPED | OUTPATIENT
Start: 2019-09-06 | End: 2020-06-16 | Stop reason: SDUPTHER

## 2019-09-06 RX ORDER — BUPROPION HYDROCHLORIDE 150 MG/1
150 TABLET ORAL DAILY
Qty: 90 TABLET | Refills: 3 | Status: SHIPPED | OUTPATIENT
Start: 2019-09-06 | End: 2020-06-16 | Stop reason: ALTCHOICE

## 2019-09-06 NOTE — PROGRESS NOTES
Office Visit    Patient Name: Gena Lui    : 1955  MRN: [id number]    Subjective:  Gena is a 63 y.o. female who presents today for:    Follow-up    64 yo female patient of mine seen for annual exam  4/10/2019 here for follow up of  chronic conditions that include hypertension, hyperlipidemia with hypertriglyceridemia, history of seizures now controlled on Keppra, history of stroke, anxiety/depression, osteoporosis with vitamin-D deficiency.  She also has a heavy tobacco use history, and previous history of alcohol abuse-though the alcohol abuse has been much improved and she has not had any alcoholic drinks to speak of over the last couple of years-- only 3 oz glass of wine with dinner only, which she has actually recently cut out.  She worked with smoking cessation and her last cigarette was May 1, 2019.  She is struggling a bit with ongoing cessation-does have some recent cravings, especially since stopping Remeron.  She was previously on Remeron to help regulate her sleep and to stimulate her appetite, however, she was noticing that it was potentially making her a little bit more sedated and so she has weaned herself off of this.  She is now using melatonin to keep her on a regular sleep schedule and notes that it is working well-- sleeping and waking on a better schedule and with increased energy throughout the day.      Labs drawn prior to office visit on 6/10/2019 remarkable for TSH improved to 5.7 from 8.4, and - TPO antibodies, triglycerides improved from 897 to 588, with incalculable LDL, with more consistently taking her lipitor 40.   CMP with ALT of 50 but otherwise unremarkable.  STD screening was negative-performed in part due to the fact that she was unexpectedly positive for HPV on her recent Pap.   She is status post repair of umbilical hernia on 2019 and overall doing well with improved pain but she is struggling with irregular bowel movements-describes issues with alternating  diarrhea and constipation.  No significant level of abdominal pain. No blood in her stool.        She is currently living alone (previously was unable to and had to live with her kids) but close to her grandchildren at her home in [place].     Diet has been fair-- eating some fruits and veggies, avoiding fried foods.     She is out of her walking routine [place] and also the heat.     Mood is stable on the Prozac 40 and she has weaned herself off of Remeron as per above.     Her 1 acute complaint is that she has experienced 2 episodes of temporary confusion.  During these episodes she seems to missed days but she does not experience any neurologic deficits such as speech slurring or numbness/tingling/weakness of her extremities.  Her children have observed these episodes and she seems a bit out of it temporarily but they are not noticing any tonic clonic activity/over concern for breakthrough seizures.  She quickly has returned to baseline and these episodes have not persisted.  She is not currently following with Neurology and has overall been stable on twice daily Keppra.        Past Medical History  Past Medical History:   Diagnosis Date    Anxiety and depression 3/21/2018    Benign essential hypertension 3/21/2018    Chronic bilateral low back pain without sciatica 3/21/2018    History of stroke 3/21/2018    Memory loss     Mixed hyperlipidemia 3/21/2018    Seizures     Tobacco abuse 3/21/2018       Past Surgical History  Past Surgical History:   Procedure Laterality Date    ANTERIOR VAGINAL REPAIR      CATARACT EXTRACTION Right 10/16/2007    Dr. Ramsey //yag 5/1/18 Dr. Bentley    CATARACT EXTRACTION W/  INTRAOCULAR LENS IMPLANT Left 10/30/2007    Dr. Ramsey// mark 5/1/18 Dr. Bentley    CHOLECYSTECTOMY      COLONOSCOPY N/A 4/19/2018    [facility]    EYE SURGERY      REPAIR, HERNIA, UMBILICAL, AGE 5 YEARS OR OLDER,  open with/without mesh N/A 2019    [facility]    TONSILLECTOMY         Family History  Family History   Problem Relation Age of Onset    Blindness Neg Hx     Glaucoma Neg Hx     Macular degeneration Neg Hx     Retinal detachment Neg Hx        Social History  Social History     Socioeconomic History    Marital status:      Spouse name: Not on file    Number of children: Not on file    Years of education: Not on file    Highest education level: Not on file   Occupational History    Not on file   Social Needs    Financial resource strain: Not on file    Food insecurity:     Worry: Not on file     Inability: Not on file    Transportation needs:     Medical: Not on file     Non-medical: Not on file   Tobacco Use    Smoking status: Former Smoker     Types: Cigarettes     Last attempt to quit: 2019     Years since quittin.3    Smokeless tobacco: Never Used    Tobacco comment: 1 PPD   Substance and Sexual Activity    Alcohol use: Yes     Comment: 1-2 drinks daily    Drug use: Yes     Types: Marijuana     Comment: intermittent to take the edge off of her anxiety    Sexual activity: Never   Lifestyle    Physical activity:     Days per week: Not on file     Minutes per session: Not on file    Stress: Not on file   Relationships    Social connections:     Talks on phone: Not on file     Gets together: Not on file     Attends Yazidi service: Not on file     Active member of club or organization: Not on file     Attends meetings of clubs or organizations: Not on file     Relationship status: Not on file   Other Topics Concern    Not on file   Social History Narrative    Not on file       Current Medications  Medications reviewed and updated.     Allergies   Review of patient's allergies indicates:   Allergen Reactions    Versed [midazolam]      IV Versed altered mental status       Review of Systems (Pertinent positives)  Review of Systems  "  Constitutional: Positive for unexpected weight change (INTENTIONAL WEIGHT GAIN).   Respiratory: Positive for shortness of breath (stable, starting to improve with quitting smoking).    Cardiovascular: Positive for palpitations. Negative for chest pain.   Gastrointestinal: Positive for constipation and diarrhea.   Genitourinary: Negative for dysuria and hematuria.   Neurological: Positive for light-headedness. Negative for dizziness and syncope.   Psychiatric/Behavioral: Positive for confusion (question of seizure activity-- 2 episodes of confusion). The patient is nervous/anxious.        /76   Pulse 80   Temp 98 °F (36.7 °C)   Ht 4' 11" (1.499 m)   Wt 55.8 kg (123 lb)   LMP  (LMP Unknown)   SpO2 96%   BMI 24.84 kg/m²     Physical Exam   Constitutional: She is oriented to person, place, and time. She appears well-developed and well-nourished. No distress.   HENT:   Head: Normocephalic and atraumatic.   Eyes: Conjunctivae are normal.   Cardiovascular: Normal rate and regular rhythm.   Pulmonary/Chest: Effort normal and breath sounds normal.   Musculoskeletal: She exhibits no edema.   Neurological: She is alert and oriented to person, place, and time.   Skin: Skin is warm and dry.   Psychiatric: She has a normal mood and affect.   Vitals reviewed.        Assessment/Plan:  Gena Lui is a 63 y.o. female who presents today for :    Gena was seen today for follow-up.    Diagnoses and all orders for this visit:    Mixed hyperlipidemia  Comments:  Some improvement on Lipitor 40, increased Lipitor 80 and recheck lipids in 3 months.  Advised attention to low carb diet/exercise  Orders:  -     atorvastatin (LIPITOR) 80 MG tablet; Take 1 tablet (80 mg total) by mouth once daily.  -     Comprehensive metabolic panel; Future  -     Lipid panel; Future  -     TSH; Future    Benign essential hypertension  Comments:  Controlled on lisinopril 10 mg daily  Orders:  -     atorvastatin (LIPITOR) 80 MG tablet; Take 1 " tablet (80 mg total) by mouth once daily.  -     Comprehensive metabolic panel; Future  -     Lipid panel; Future  -     TSH; Future  -     CBC auto differential; Future    Anxiety and depression  Comments:  Mood overall stable on Prozac 40, adding Wellbutrin 150 XL to augment and help with smoking cessation  Orders:  -     buPROPion (WELLBUTRIN XL) 150 MG TB24 tablet; Take 1 tablet (150 mg total) by mouth once daily.    History of stroke  Comments:  Secondary prevention with daily high-dose statin and baby aspirin 81 mg    Long-term use of aspirin therapy    Former smoker  Comments:  Last cigarette 5/1/19. Struggling w/ continued cessation so will & trial of Wellbutrin 150 XL daily to see if this augments Prozac &helps w/ continued cessation  Orders:  -     buPROPion (WELLBUTRIN XL) 150 MG TB24 tablet; Take 1 tablet (150 mg total) by mouth once daily.    Vitamin D deficiency  Comments:  Previously normalized on prescription supplementation, recheck in 3 months with labs  Orders:  -     Vitamin D; Future    History of alcohol abuse  Comments:  Not drinking alcohol regularly at all and if so it is just a 3 oz glass of wine    Abnormal TSH  Comments:  TSH is normalizing and TPO antibodies are negative, continued monitoring, no need for medication at this time  Orders:  -     TSH; Future    Papanicolaou smear of cervix with positive high risk human papilloma virus (HPV) test    Postmenopausal osteoporosis    History of seizures  Comments:  Unclear if recent confusion episodes do represent some breakthrough seizure activity.  Continue Keppra and will refer to Neurology if they persist.    Medication management  -     Comprehensive metabolic panel; Future  -     Lipid panel; Future  -     TSH; Future  -     CBC auto differential; Future  -     Vitamin D; Future    Needs flu shot  -     Influenza - Quadrivalent (6 months+) (PF)            ICD-10-CM ICD-9-CM    1. Mixed hyperlipidemia E78.2 272.2 atorvastatin (LIPITOR) 80  MG tablet      Comprehensive metabolic panel      Lipid panel      TSH    Some improvement on Lipitor 40, increased Lipitor 80 and recheck lipids in 3 months.  Advised attention to low carb diet/exercise   2. Benign essential hypertension I10 401.1 atorvastatin (LIPITOR) 80 MG tablet      Comprehensive metabolic panel      Lipid panel      TSH      CBC auto differential    Controlled on lisinopril 10 mg daily   3. Anxiety and depression F41.9 300.00 buPROPion (WELLBUTRIN XL) 150 MG TB24 tablet    F32.9 311     Mood overall stable on Prozac 40, adding Wellbutrin 150 XL to augment and help with smoking cessation   4. History of stroke Z86.73 V12.54     Secondary prevention with daily high-dose statin and baby aspirin 81 mg   5. Long-term use of aspirin therapy Z79.82 V58.66    6. Former smoker Z87.891 V15.82 buPROPion (WELLBUTRIN XL) 150 MG TB24 tablet    Last cigarette 5/1/19. Struggling w/ continued cessation so will & trial of Wellbutrin 150 XL daily to see if this augments Prozac &helps w/ continued cessation   7. Vitamin D deficiency E55.9 268.9 Vitamin D    Previously normalized on prescription supplementation, recheck in 3 months with labs   8. History of alcohol abuse Z87.898 305.03     Not drinking alcohol regularly at all and if so it is just a 3 oz glass of wine   9. Abnormal TSH R79.89 790.6 TSH    TSH is normalizing and TPO antibodies are negative, continued monitoring, no need for medication at this time   10. Papanicolaou smear of cervix with positive high risk human papilloma virus (HPV) test R87.810 795.05    11. Postmenopausal osteoporosis M81.0 733.01    12. History of seizures Z87.898 V12.49     Unclear if recent confusion episodes do represent some breakthrough seizure activity.  Continue Keppra and will refer to Neurology if they persist.   13. Medication management Z79.899 V58.69 Comprehensive metabolic panel      Lipid panel      TSH      CBC auto differential      Vitamin D   14. Needs flu  shot Z23 V04.81 Influenza - Quadrivalent (6 months+) (PF)       Patient Instructions   TRIAL OF CITRUCEL TO HELP WITH BOWEL REGULATION:     CITRUCEL ORANGE POWDER (NOT SUGAR FREE AS ARTIFICIAL SWEETENERS CAN WORSEN GAS/BLOATING):  MIX 1 HEAPING TABLESPOON WITH 10 OZ OF WATER AND DRINK NIGHTLY AFTER DINNER. ADD A SECOND DOSE AFTER BREAKFAST IF NEEDED.     RESUME WALKING ROUTINE WHEN ABLE    LOOK OVER MEDITERRANEAN DIET HAND OUT AND TRY TO COOK LOW CARB      ADD WELLBUTRIN 150 XL IN THE MORNING WITH PROZAC TO HELP WITH CONTINUED SMOKING CESSATION    INCREASE LIPITOR TO 80 MG DAILY TO HELP FURTHER LOWER THE TRIGLYCERIDES     IF CONFUSION EPISODES CONTINUE TO REOCCUR-- WILL REFER TO NEUROLOGY TO DISCUSS POTENTIAL EEG FOR EVALUATION OF BREAKTHROUGH SEIZURE ACTIVITY          Follow up in about 3 months (around 12/6/2019) for to follow up on lab results.

## 2019-09-06 NOTE — PATIENT INSTRUCTIONS
TRIAL OF CITRUCEL TO HELP WITH BOWEL REGULATION:     CITRUCEL ORANGE POWDER (NOT SUGAR FREE AS ARTIFICIAL SWEETENERS CAN WORSEN GAS/BLOATING):  MIX 1 HEAPING TABLESPOON WITH 10 OZ OF WATER AND DRINK NIGHTLY AFTER DINNER. ADD A SECOND DOSE AFTER BREAKFAST IF NEEDED.     RESUME WALKING ROUTINE WHEN ABLE    LOOK OVER MEDITERRANEAN DIET HAND OUT AND TRY TO COOK LOW CARB      ADD WELLBUTRIN 150 XL IN THE MORNING WITH PROZAC TO HELP WITH CONTINUED SMOKING CESSATION    INCREASE LIPITOR TO 80 MG DAILY TO HELP FURTHER LOWER THE TRIGLYCERIDES     IF CONFUSION EPISODES CONTINUE TO REOCCUR-- WILL REFER TO NEUROLOGY TO DISCUSS POTENTIAL EEG FOR EVALUATION OF BREAKTHROUGH SEIZURE ACTIVITY

## 2019-11-13 ENCOUNTER — TELEPHONE (OUTPATIENT)
Dept: SMOKING CESSATION | Facility: CLINIC | Age: 64
End: 2019-11-13

## 2019-11-17 DIAGNOSIS — I10 BENIGN ESSENTIAL HYPERTENSION: ICD-10-CM

## 2019-11-17 RX ORDER — LISINOPRIL 10 MG/1
TABLET ORAL
Qty: 90 TABLET | Refills: 4 | Status: SHIPPED | OUTPATIENT
Start: 2019-11-17 | End: 2020-06-16 | Stop reason: SDUPTHER

## 2019-11-29 ENCOUNTER — LAB VISIT (OUTPATIENT)
Dept: LAB | Facility: HOSPITAL | Age: 64
End: 2019-11-29
Attending: FAMILY MEDICINE
Payer: MEDICARE

## 2019-11-29 DIAGNOSIS — E55.9 VITAMIN D DEFICIENCY: ICD-10-CM

## 2019-11-29 DIAGNOSIS — E78.2 MIXED HYPERLIPIDEMIA: ICD-10-CM

## 2019-11-29 DIAGNOSIS — Z79.899 MEDICATION MANAGEMENT: ICD-10-CM

## 2019-11-29 DIAGNOSIS — R79.89 ABNORMAL TSH: ICD-10-CM

## 2019-11-29 DIAGNOSIS — I10 BENIGN ESSENTIAL HYPERTENSION: ICD-10-CM

## 2019-11-29 LAB
25(OH)D3+25(OH)D2 SERPL-MCNC: 52 NG/ML (ref 30–96)
ALBUMIN SERPL BCP-MCNC: 3.9 G/DL (ref 3.5–5.2)
ALP SERPL-CCNC: 122 U/L (ref 55–135)
ALT SERPL W/O P-5'-P-CCNC: 21 U/L (ref 10–44)
ANION GAP SERPL CALC-SCNC: 12 MMOL/L (ref 8–16)
AST SERPL-CCNC: 19 U/L (ref 10–40)
BASOPHILS # BLD AUTO: 0.05 K/UL (ref 0–0.2)
BASOPHILS NFR BLD: 0.8 % (ref 0–1.9)
BILIRUB SERPL-MCNC: 0.8 MG/DL (ref 0.1–1)
BUN SERPL-MCNC: 13 MG/DL (ref 8–23)
CALCIUM SERPL-MCNC: 9.9 MG/DL (ref 8.7–10.5)
CHLORIDE SERPL-SCNC: 104 MMOL/L (ref 95–110)
CHOLEST SERPL-MCNC: 137 MG/DL (ref 120–199)
CHOLEST/HDLC SERPL: 4.2 {RATIO} (ref 2–5)
CO2 SERPL-SCNC: 25 MMOL/L (ref 23–29)
CREAT SERPL-MCNC: 0.9 MG/DL (ref 0.5–1.4)
DIFFERENTIAL METHOD: NORMAL
EOSINOPHIL # BLD AUTO: 0.1 K/UL (ref 0–0.5)
EOSINOPHIL NFR BLD: 1.9 % (ref 0–8)
ERYTHROCYTE [DISTWIDTH] IN BLOOD BY AUTOMATED COUNT: 13.4 % (ref 11.5–14.5)
EST. GFR  (AFRICAN AMERICAN): >60 ML/MIN/1.73 M^2
EST. GFR  (NON AFRICAN AMERICAN): >60 ML/MIN/1.73 M^2
GLUCOSE SERPL-MCNC: 96 MG/DL (ref 70–110)
HCT VFR BLD AUTO: 40.3 % (ref 37–48.5)
HDLC SERPL-MCNC: 33 MG/DL (ref 40–75)
HDLC SERPL: 24.1 % (ref 20–50)
HGB BLD-MCNC: 13 G/DL (ref 12–16)
LDLC SERPL CALC-MCNC: 61.2 MG/DL (ref 63–159)
LYMPHOCYTES # BLD AUTO: 1.5 K/UL (ref 1–4.8)
LYMPHOCYTES NFR BLD: 22.4 % (ref 18–48)
MCH RBC QN AUTO: 30.2 PG (ref 27–31)
MCHC RBC AUTO-ENTMCNC: 32.3 G/DL (ref 32–36)
MCV RBC AUTO: 94 FL (ref 82–98)
MONOCYTES # BLD AUTO: 0.6 K/UL (ref 0.3–1)
MONOCYTES NFR BLD: 8.7 % (ref 4–15)
NEUTROPHILS # BLD AUTO: 4.2 K/UL (ref 1.8–7.7)
NEUTROPHILS NFR BLD: 66.2 % (ref 38–73)
NONHDLC SERPL-MCNC: 104 MG/DL
PLATELET # BLD AUTO: 256 K/UL (ref 150–350)
PMV BLD AUTO: 10 FL (ref 9.2–12.9)
POTASSIUM SERPL-SCNC: 3.8 MMOL/L (ref 3.5–5.1)
PROT SERPL-MCNC: 7.4 G/DL (ref 6–8.4)
RBC # BLD AUTO: 4.3 M/UL (ref 4–5.4)
SODIUM SERPL-SCNC: 141 MMOL/L (ref 136–145)
TRIGL SERPL-MCNC: 214 MG/DL (ref 30–150)
TSH SERPL DL<=0.005 MIU/L-ACNC: 3.45 UIU/ML (ref 0.4–4)
WBC # BLD AUTO: 6.46 K/UL (ref 3.9–12.7)

## 2019-11-29 PROCEDURE — 82306 VITAMIN D 25 HYDROXY: CPT

## 2019-11-29 PROCEDURE — 80053 COMPREHEN METABOLIC PANEL: CPT

## 2019-11-29 PROCEDURE — 80061 LIPID PANEL: CPT

## 2019-11-29 PROCEDURE — 84443 ASSAY THYROID STIM HORMONE: CPT

## 2019-11-29 PROCEDURE — 85025 COMPLETE CBC W/AUTO DIFF WBC: CPT

## 2019-11-29 PROCEDURE — 36415 COLL VENOUS BLD VENIPUNCTURE: CPT

## 2019-12-09 ENCOUNTER — OFFICE VISIT (OUTPATIENT)
Dept: FAMILY MEDICINE | Facility: CLINIC | Age: 64
End: 2019-12-09
Payer: MEDICARE

## 2019-12-09 VITALS
OXYGEN SATURATION: 97 % | SYSTOLIC BLOOD PRESSURE: 100 MMHG | HEIGHT: 59 IN | BODY MASS INDEX: 22.98 KG/M2 | TEMPERATURE: 98 F | DIASTOLIC BLOOD PRESSURE: 74 MMHG | WEIGHT: 114 LBS | HEART RATE: 94 BPM

## 2019-12-09 DIAGNOSIS — Z87.891 FORMER SMOKER: ICD-10-CM

## 2019-12-09 DIAGNOSIS — R63.4 WEIGHT LOSS: ICD-10-CM

## 2019-12-09 DIAGNOSIS — F32.A ANXIETY AND DEPRESSION: ICD-10-CM

## 2019-12-09 DIAGNOSIS — Z79.899 MEDICATION MANAGEMENT: ICD-10-CM

## 2019-12-09 DIAGNOSIS — R53.1 WEAKNESS: ICD-10-CM

## 2019-12-09 DIAGNOSIS — E78.2 MIXED HYPERLIPIDEMIA: ICD-10-CM

## 2019-12-09 DIAGNOSIS — I10 BENIGN ESSENTIAL HYPERTENSION: Primary | ICD-10-CM

## 2019-12-09 DIAGNOSIS — R26.9 GAIT DISTURBANCE: ICD-10-CM

## 2019-12-09 DIAGNOSIS — R79.9 ABNORMAL BLOOD CHEMISTRY: ICD-10-CM

## 2019-12-09 DIAGNOSIS — R87.810 PAPANICOLAOU SMEAR OF CERVIX WITH POSITIVE HIGH RISK HUMAN PAPILLOMA VIRUS (HPV) TEST: ICD-10-CM

## 2019-12-09 DIAGNOSIS — F41.9 ANXIETY AND DEPRESSION: ICD-10-CM

## 2019-12-09 DIAGNOSIS — R29.6 FALLS FREQUENTLY: ICD-10-CM

## 2019-12-09 DIAGNOSIS — F10.11 HISTORY OF ALCOHOL ABUSE: ICD-10-CM

## 2019-12-09 DIAGNOSIS — Z79.82 LONG-TERM USE OF ASPIRIN THERAPY: ICD-10-CM

## 2019-12-09 DIAGNOSIS — Z87.898 HISTORY OF SEIZURES: ICD-10-CM

## 2019-12-09 DIAGNOSIS — Z86.73 HISTORY OF STROKE: ICD-10-CM

## 2019-12-09 PROCEDURE — 3078F DIAST BP <80 MM HG: CPT | Mod: CPTII,S$GLB,, | Performed by: FAMILY MEDICINE

## 2019-12-09 PROCEDURE — 3008F PR BODY MASS INDEX (BMI) DOCUMENTED: ICD-10-PCS | Mod: CPTII,S$GLB,, | Performed by: FAMILY MEDICINE

## 2019-12-09 PROCEDURE — 3074F SYST BP LT 130 MM HG: CPT | Mod: CPTII,S$GLB,, | Performed by: FAMILY MEDICINE

## 2019-12-09 PROCEDURE — 99499 UNLISTED E&M SERVICE: CPT | Mod: S$GLB,,, | Performed by: FAMILY MEDICINE

## 2019-12-09 PROCEDURE — 3074F PR MOST RECENT SYSTOLIC BLOOD PRESSURE < 130 MM HG: ICD-10-PCS | Mod: CPTII,S$GLB,, | Performed by: FAMILY MEDICINE

## 2019-12-09 PROCEDURE — 99214 OFFICE O/P EST MOD 30 MIN: CPT | Mod: S$GLB,,, | Performed by: FAMILY MEDICINE

## 2019-12-09 PROCEDURE — 99999 PR PBB SHADOW E&M-EST. PATIENT-LVL IV: CPT | Mod: PBBFAC,,, | Performed by: FAMILY MEDICINE

## 2019-12-09 PROCEDURE — 99499 RISK ADDL DX/OHS AUDIT: ICD-10-PCS | Mod: S$GLB,,, | Performed by: FAMILY MEDICINE

## 2019-12-09 PROCEDURE — 99214 PR OFFICE/OUTPT VISIT, EST, LEVL IV, 30-39 MIN: ICD-10-PCS | Mod: S$GLB,,, | Performed by: FAMILY MEDICINE

## 2019-12-09 PROCEDURE — 99999 PR PBB SHADOW E&M-EST. PATIENT-LVL IV: ICD-10-PCS | Mod: PBBFAC,,, | Performed by: FAMILY MEDICINE

## 2019-12-09 PROCEDURE — 3078F PR MOST RECENT DIASTOLIC BLOOD PRESSURE < 80 MM HG: ICD-10-PCS | Mod: CPTII,S$GLB,, | Performed by: FAMILY MEDICINE

## 2019-12-09 PROCEDURE — 3008F BODY MASS INDEX DOCD: CPT | Mod: CPTII,S$GLB,, | Performed by: FAMILY MEDICINE

## 2019-12-09 NOTE — PROGRESS NOTES
Office Visit    Patient Name: Gena Lui    : 1955  MRN: [id number]    Subjective:  Gena is a 64 y.o. female who presents today for:    Follow-up (falls)    64 yo female patient of mine seen for annual exam  4/10/2019 and most recently for follow up 2019 here for follow up of  chronic conditions that include hypertension, hyperlipidemia with hypertriglyceridemia, history of seizures now controlled on Keppra, history of stroke, anxiety/depression, osteoporosis with vitamin-D deficiency. History of heavy tobacco use now in smoking cessation program, and previous history of alcohol abuse-that is recently much improved and she has not had any alcoholic drinks to speak of over the last couple of years-- only 3 oz glass of wine with dinner only, which she has actually recently cut out.      She is working with smoking cessation, most recently seen 2019, and her last cigarette was May 1, 2019, with last time smoking marijuana about 1 month ago.  She is struggling a bit with ongoing cessation-does have some recent cravings, especially since stopping Remeron. She was previously on Remeron to help regulate her sleep and to stimulate her appetite, however, she was noticing that it was potentially making her a little bit more sedated and so she has weaned herself off of this.  Sleep is now improved with use of melatonin supplement nightly.  Her weight is down 10 lbs in the last 3 months.  she does feel that her appetite is about last since stopping the Remeron.  She is not having digestive issues or constitutional symptoms.  Her colonoscopy, Pap and breast cancer screening, are up-to-date.    Labs drawn prior to office visit on 2019 remarkable for improvement in triglycerides from 588->> 218 with increase in Lipitor from 40-80 mg daily, her LDL is 67.  Her LFTs and EGFR normal.  Her vitamin-D has remained normal at 52. TSH is 3.4 & previously( -) TPO antibodies.      She is currently  living alone (previously was unable to and had to live with her kids) but close to her grandchildren at her home in [place].      Diet has been fair-- eating some fruits and veggies, avoiding fried foods. Eating a balanced diet and trying to eat more consistently.      She is out of her walking routine [place].     Mood is stable on the Prozac 40 and she has weaned herself off of Remeron as per above.        Having some more frequent falls and balance concerns, but no focal neurologic deficits.  Feels deconditioned and like her balanace is off.  She has not had numbness, tingling, focal weakness, but on a couple of occasions when she has fallen she has had trouble getting herself up off the floor.  She is not having any new headaches or visual disturbance.  She notes she has been having some trouble walking but this could be because she is out of her routine.  She reports concerns for shuffling gait.      Past Medical History  Past Medical History:   Diagnosis Date    Anxiety and depression 3/21/2018    Benign essential hypertension 3/21/2018    Chronic bilateral low back pain without sciatica 3/21/2018    History of stroke 3/21/2018    Memory loss     Mixed hyperlipidemia 3/21/2018    Seizures     Tobacco abuse 3/21/2018       Past Surgical History  Past Surgical History:   Procedure Laterality Date    ANTERIOR VAGINAL REPAIR      CATARACT EXTRACTION Right 10/16/2007    Dr. Ramsey //yag 5/1/18 Dr. Bentley    CATARACT EXTRACTION W/  INTRAOCULAR LENS IMPLANT Left 10/30/2007    Dr. Ramsey// yag 5/1/18 Dr. Bentley    CHOLECYSTECTOMY      COLONOSCOPY N/A 4/19/2018    Procedure: COLONOSCOPY;  Surgeon: Yi Goncalves MD;  Location: [facility];  Service: Endoscopy;  Laterality: N/A;    EYE SURGERY      TONSILLECTOMY      UMBILICAL HERNIA REPAIR N/A 2/8/2019    Procedure: REPAIR, HERNIA, UMBILICAL, AGE 5 YEARS OR OLDER, open with/without mesh;  Surgeon: Magno TOLEDO  MD Tad;  Location: [address];  Service: General;  Laterality: N/A;       Family History  Family History   Problem Relation Age of Onset    Blindness Neg Hx     Glaucoma Neg Hx     Macular degeneration Neg Hx     Retinal detachment Neg Hx        Social History  Social History     Socioeconomic History    Marital status:      Spouse name: Not on file    Number of children: Not on file    Years of education: Not on file    Highest education level: Not on file   Occupational History    Not on file   Social Needs    Financial resource strain: Not on file    Food insecurity:     Worry: Not on file     Inability: Not on file    Transportation needs:     Medical: Not on file     Non-medical: Not on file   Tobacco Use    Smoking status: Former Smoker     Types: Cigarettes     Last attempt to quit: 2019     Years since quittin.5    Smokeless tobacco: Never Used    Tobacco comment: 1 PPD   Substance and Sexual Activity    Alcohol use: Yes     Comment: 1-2 drinks daily    Drug use: Yes     Types: Marijuana     Comment: intermittent to take the edge off of her anxiety    Sexual activity: Never   Lifestyle    Physical activity:     Days per week: Not on file     Minutes per session: Not on file    Stress: Not on file   Relationships    Social connections:     Talks on phone: Not on file     Gets together: Not on file     Attends Hindu service: Not on file     Active member of club or organization: Not on file     Attends meetings of clubs or organizations: Not on file     Relationship status: Not on file   Other Topics Concern    Not on file   Social History Narrative    Not on file       Current Medications  Medications reviewed and updated.     Allergies   Review of patient's allergies indicates:   Allergen Reactions    Versed [midazolam]      IV Versed altered mental status       Review of Systems (Pertinent positives)  Review of Systems   Constitutional: Negative for  "unexpected weight change.   Respiratory: Negative for cough, chest tightness and shortness of breath.    Cardiovascular: Negative for chest pain and palpitations.   Gastrointestinal: Positive for constipation (improved with water and high fiber diet).   Musculoskeletal: Positive for gait problem.   Neurological: Positive for weakness. Negative for dizziness, syncope and light-headedness.       /74 (BP Location: Left arm, Patient Position: Sitting, BP Method: Small (Manual))   Pulse 94   Temp 97.8 °F (36.6 °C) (Oral)   Ht 4' 11" (1.499 m)   Wt 51.7 kg (113 lb 15.7 oz)   LMP  (LMP Unknown)   SpO2 97%   BMI 23.02 kg/m²     Physical Exam   Constitutional: She is oriented to person, place, and time. She appears well-developed and well-nourished. No distress.   HENT:   Head: Normocephalic and atraumatic.   Eyes: Conjunctivae are normal.   Cardiovascular: Normal rate and regular rhythm.   Pulmonary/Chest: Effort normal and breath sounds normal.   Musculoskeletal: She exhibits no edema.   Neurological: She is alert and oriented to person, place, and time. She displays no tremor (no tremor at rest, has moderate finger to nose intention tremor). No cranial nerve deficit. She exhibits normal muscle tone. She displays a negative Romberg sign. Coordination and gait normal.   Skin: Skin is warm and dry.   Psychiatric: She has a normal mood and affect.   Vitals reviewed.        Assessment/Plan:  Gena Lui is a 64 y.o. female who presents today for :    Gena was seen today for follow-up.    Diagnoses and all orders for this visit:    Benign essential hypertension  Comments:  Controlled on lisinopril 10 mg daily.  Continue current regimen  Orders:  -     Hemoglobin A1c; Future  -     Comprehensive metabolic panel; Future  -     Lipid panel; Future  -     CBC auto differential; Future  -     Magnesium; Future    Mixed hyperlipidemia  Comments:  Significant improvement on Lipitor 80.  LDL is at goal, advised " attention to diet/exercise for continued triglyceride improvement  Orders:  -     Hemoglobin A1c; Future  -     Comprehensive metabolic panel; Future  -     Lipid panel; Future  -     TSH; Future    Anxiety and depression  Comments:  Mood overall stable on Prozac 40, has been trying Wellbutrin 150 XL to help with smoking cessation cravings    History of stroke  Comments:  Advise secondary prevention with aspirin, statin, blood pressure control.    Long-term use of aspirin therapy    Former smoker  Comments:  Continue Wellbutrin, smoking cessation follow-up.    History of seizures  Comments:  Stable on Keppra, may need to see Neurology if her falls/neurologic concerns persist.    History of alcohol abuse    Papanicolaou smear of cervix with positive high risk human papilloma virus (HPV) test  Comments:  HPV positive but normal cells on Pap earlier this year, will repeat yearly Pap in 6 months.  Gyn referral if HR HPV remains positive    Medication management  -     Hemoglobin A1c; Future  -     Comprehensive metabolic panel; Future  -     Lipid panel; Future  -     CBC auto differential; Future  -     Magnesium; Future  -     Vitamin B12; Future  -     Ferritin; Future  -     Iron and TIBC; Future  -     Vitamin D; Future  -     TSH; Future  -     Prealbumin; Future    Falls frequently  -     Ambulatory Referral to Physical/Occupational Therapy    Gait disturbance  Comments:  No tremor at rest, cogwheel rigidity or obvious defiticts on neurologic exam.  Trial of physical therapy, Neurology referral if ongoing concerns  Orders:  -     Ambulatory Referral to Physical/Occupational Therapy  -     Comprehensive metabolic panel; Future  -     CBC auto differential; Future  -     Vitamin B12; Future    Weight loss  Comments:  Could be because she is off of Remeron and eating a lot healthier/has cut out alcohol.  Up-to-date with age recommended cancer screenings, follow labs  Orders:  -     CBC auto differential; Future  -      Ferritin; Future  -     Iron and TIBC; Future  -     TSH; Future  -     Prealbumin; Future    Weakness  -     Hemoglobin A1c; Future  -     Comprehensive metabolic panel; Future  -     CBC auto differential; Future  -     Magnesium; Future  -     Vitamin B12; Future  -     Ferritin; Future  -     Iron and TIBC; Future  -     Vitamin D; Future  -     TSH; Future  -     Prealbumin; Future    Abnormal blood chemistry  -     Comprehensive metabolic panel; Future  -     Ferritin; Future  -     Iron and TIBC; Future            ICD-10-CM ICD-9-CM    1. Benign essential hypertension I10 401.1 Hemoglobin A1c      Comprehensive metabolic panel      Lipid panel      CBC auto differential      Magnesium    Controlled on lisinopril 10 mg daily.  Continue current regimen   2. Mixed hyperlipidemia E78.2 272.2 Hemoglobin A1c      Comprehensive metabolic panel      Lipid panel      TSH    Significant improvement on Lipitor 80.  LDL is at goal, advised attention to diet/exercise for continued triglyceride improvement   3. Anxiety and depression F41.9 300.00     F32.9 311     Mood overall stable on Prozac 40, has been trying Wellbutrin 150 XL to help with smoking cessation cravings   4. History of stroke Z86.73 V12.54     Advise secondary prevention with aspirin, statin, blood pressure control.   5. Long-term use of aspirin therapy Z79.82 V58.66    6. Former smoker Z87.891 V15.82     Continue Wellbutrin, smoking cessation follow-up.   7. History of seizures Z87.898 V12.49     Stable on Keppra, may need to see Neurology if her falls/neurologic concerns persist.   8. History of alcohol abuse F10.11 305.03    9. Papanicolaou smear of cervix with positive high risk human papilloma virus (HPV) test R87.810 795.05     HPV positive but normal cells on Pap earlier this year, will repeat yearly Pap in 6 months.  Gyn referral if HR HPV remains positive   10. Medication management Z79.899 V58.69 Hemoglobin A1c      Comprehensive metabolic  panel      Lipid panel      CBC auto differential      Magnesium      Vitamin B12      Ferritin      Iron and TIBC      Vitamin D      TSH      Prealbumin   11. Falls frequently R29.6 V15.88 Ambulatory Referral to Physical/Occupational Therapy   12. Gait disturbance R26.9 781.2 Ambulatory Referral to Physical/Occupational Therapy      Comprehensive metabolic panel      CBC auto differential      Vitamin B12    No tremor at rest, cogwheel rigidity or obvious defiticts on neurologic exam.  Trial of physical therapy, Neurology referral if ongoing concerns   13. Weight loss R63.4 783.21 CBC auto differential      Ferritin      Iron and TIBC      TSH      Prealbumin    Could be because she is off of Remeron and eating a lot healthier/has cut out alcohol.  Up-to-date with age recommended cancer screenings, follow labs   14. Weakness R53.1 780.79 Hemoglobin A1c      Comprehensive metabolic panel      CBC auto differential      Magnesium      Vitamin B12      Ferritin      Iron and TIBC      Vitamin D      TSH      Prealbumin   15. Abnormal blood chemistry R79.9 790.6 Comprehensive metabolic panel      Ferritin      Iron and TIBC       There are no Patient Instructions on file for this visit.      Follow up in about 6 months (around 6/9/2020) for to follow up on lab results, return as needed for new concerns.

## 2019-12-23 PROBLEM — Z74.09 IMPAIRED FUNCTIONAL MOBILITY, BALANCE, GAIT, AND ENDURANCE: Status: ACTIVE | Noted: 2019-12-23

## 2019-12-27 RX ORDER — ALPRAZOLAM 0.5 MG/1
TABLET ORAL
Qty: 30 TABLET | Refills: 5 | Status: SHIPPED | OUTPATIENT
Start: 2019-12-27 | End: 2020-06-16 | Stop reason: SDUPTHER

## 2019-12-27 NOTE — TELEPHONE ENCOUNTER
----- Message from Дмитрийnestor Angulot sent at 12/27/2019  1:35 PM CST -----  Contact: [phone] / dkga  Patient states that her medication for lisinopril 10 MG tablet is supposed to be changed to 20 MG, but is being told by the pharmacy it hasn't changed. Please Advise.

## 2019-12-27 NOTE — TELEPHONE ENCOUNTER
Called pt and let her know that her lisinopril hasn't changed to 20 that it is still 10. She verbalized understanding. She also had a question about having the abnormal blood chemical listed on her avs, I let her know that she does not have an abnormal blood chemical that she was checking labs associated with that prior to her next visit as a precaution, she also asked about the HPV virus that she was aware that you let her know that she carried it but she wanted to know if she should be worried. I let her know that Dr. Flores just said in the notes that we would repeat the pap in a year to see if it was still there and if it was we would refer her to gyno. She verbalized understanding.

## 2020-03-05 ENCOUNTER — TELEPHONE (OUTPATIENT)
Dept: FAMILY MEDICINE | Facility: CLINIC | Age: 65
End: 2020-03-05

## 2020-03-05 NOTE — TELEPHONE ENCOUNTER
----- Message from Rachelle Flores MD sent at 3/5/2020  2:42 PM CST -----  Contact: [phone]/self  Is there any way she can take a phone picture of the spot and attach it so I can see what the spot looks like and what type of referral (if any) she may need? That would help but if she can't do that I'll just do a general derm referral. Let me know-- RODRIGO tobin   ----- Message -----  From: Jannet Burrell MA  Sent: 3/5/2020  11:49 AM CST  To: Rachelle Flores MD        ----- Message -----  From: Roseanna Flores  Sent: 3/5/2020  11:41 AM CST  To: [facility] Staff    Type:  Patient Requesting Referral    Who Called:Self  Referral to What Specialty:Dermatology  Reason for Referral:dark spot on back   [facility]   Patient Requesting a Response?:Yes   Would the patient rather a call back or a response via [facility]?callback  Best Call Back Number:[phone]  Additional Information:N/A

## 2020-05-04 DIAGNOSIS — Z87.898 HISTORY OF SEIZURES: ICD-10-CM

## 2020-05-04 RX ORDER — LEVETIRACETAM 500 MG/1
TABLET ORAL
Qty: 180 TABLET | Refills: 4 | Status: SHIPPED | OUTPATIENT
Start: 2020-05-04 | End: 2021-08-11

## 2020-05-04 RX ORDER — ERGOCALCIFEROL 1.25 MG/1
CAPSULE ORAL
Qty: 12 CAPSULE | Refills: 6 | Status: SHIPPED | OUTPATIENT
Start: 2020-05-04 | End: 2021-06-18

## 2020-06-05 ENCOUNTER — TELEPHONE (OUTPATIENT)
Dept: FAMILY MEDICINE | Facility: CLINIC | Age: 65
End: 2020-06-05

## 2020-06-05 DIAGNOSIS — Z12.31 ENCOUNTER FOR SCREENING MAMMOGRAM FOR BREAST CANCER: Primary | ICD-10-CM

## 2020-06-05 NOTE — TELEPHONE ENCOUNTER
----- Message from Yesenia Nickerson sent at 6/5/2020 10:47 AM CDT -----  Contact: PT   PT is requesting an order for mammogram   She can be reached at [phone]    Thanks

## 2020-06-09 ENCOUNTER — LAB VISIT (OUTPATIENT)
Dept: LAB | Facility: HOSPITAL | Age: 65
End: 2020-06-09
Attending: FAMILY MEDICINE
Payer: MEDICARE

## 2020-06-09 DIAGNOSIS — E78.2 MIXED HYPERLIPIDEMIA: ICD-10-CM

## 2020-06-09 DIAGNOSIS — R63.4 WEIGHT LOSS: ICD-10-CM

## 2020-06-09 DIAGNOSIS — R53.1 WEAKNESS: ICD-10-CM

## 2020-06-09 DIAGNOSIS — R79.9 ABNORMAL BLOOD CHEMISTRY: ICD-10-CM

## 2020-06-09 DIAGNOSIS — I10 BENIGN ESSENTIAL HYPERTENSION: ICD-10-CM

## 2020-06-09 DIAGNOSIS — Z79.899 MEDICATION MANAGEMENT: ICD-10-CM

## 2020-06-09 DIAGNOSIS — R26.9 GAIT DISTURBANCE: ICD-10-CM

## 2020-06-09 LAB
25(OH)D3+25(OH)D2 SERPL-MCNC: 56 NG/ML (ref 30–96)
ALBUMIN SERPL BCP-MCNC: 3.6 G/DL (ref 3.5–5.2)
ALP SERPL-CCNC: 105 U/L (ref 55–135)
ALT SERPL W/O P-5'-P-CCNC: 22 U/L (ref 10–44)
ANION GAP SERPL CALC-SCNC: 7 MMOL/L (ref 8–16)
AST SERPL-CCNC: 23 U/L (ref 10–40)
BASOPHILS # BLD AUTO: 0.06 K/UL (ref 0–0.2)
BASOPHILS NFR BLD: 1 % (ref 0–1.9)
BILIRUB SERPL-MCNC: 0.6 MG/DL (ref 0.1–1)
BUN SERPL-MCNC: 7 MG/DL (ref 8–23)
CALCIUM SERPL-MCNC: 9.4 MG/DL (ref 8.7–10.5)
CHLORIDE SERPL-SCNC: 107 MMOL/L (ref 95–110)
CHOLEST SERPL-MCNC: 117 MG/DL (ref 120–199)
CHOLEST/HDLC SERPL: 3.7 {RATIO} (ref 2–5)
CO2 SERPL-SCNC: 26 MMOL/L (ref 23–29)
CREAT SERPL-MCNC: 0.7 MG/DL (ref 0.5–1.4)
DIFFERENTIAL METHOD: ABNORMAL
EOSINOPHIL # BLD AUTO: 0.1 K/UL (ref 0–0.5)
EOSINOPHIL NFR BLD: 2 % (ref 0–8)
ERYTHROCYTE [DISTWIDTH] IN BLOOD BY AUTOMATED COUNT: 13.2 % (ref 11.5–14.5)
EST. GFR  (AFRICAN AMERICAN): >60 ML/MIN/1.73 M^2
EST. GFR  (NON AFRICAN AMERICAN): >60 ML/MIN/1.73 M^2
ESTIMATED AVG GLUCOSE: 105 MG/DL (ref 68–131)
FERRITIN SERPL-MCNC: 174 NG/ML (ref 20–300)
GLUCOSE SERPL-MCNC: 99 MG/DL (ref 70–110)
HBA1C MFR BLD HPLC: 5.3 % (ref 4–5.6)
HCT VFR BLD AUTO: 37.2 % (ref 37–48.5)
HDLC SERPL-MCNC: 32 MG/DL (ref 40–75)
HDLC SERPL: 27.4 % (ref 20–50)
HGB BLD-MCNC: 11.9 G/DL (ref 12–16)
IMM GRANULOCYTES # BLD AUTO: 0.04 K/UL (ref 0–0.04)
IMM GRANULOCYTES NFR BLD AUTO: 0.7 % (ref 0–0.5)
IRON SERPL-MCNC: 52 UG/DL (ref 30–160)
LDLC SERPL CALC-MCNC: 42.4 MG/DL (ref 63–159)
LYMPHOCYTES # BLD AUTO: 1.6 K/UL (ref 1–4.8)
LYMPHOCYTES NFR BLD: 26.6 % (ref 18–48)
MAGNESIUM SERPL-MCNC: 1.6 MG/DL (ref 1.6–2.6)
MCH RBC QN AUTO: 30.1 PG (ref 27–31)
MCHC RBC AUTO-ENTMCNC: 32 G/DL (ref 32–36)
MCV RBC AUTO: 94 FL (ref 82–98)
MONOCYTES # BLD AUTO: 0.4 K/UL (ref 0.3–1)
MONOCYTES NFR BLD: 6.6 % (ref 4–15)
NEUTROPHILS # BLD AUTO: 3.9 K/UL (ref 1.8–7.7)
NEUTROPHILS NFR BLD: 63.1 % (ref 38–73)
NONHDLC SERPL-MCNC: 85 MG/DL
NRBC BLD-RTO: 0 /100 WBC
PLATELET # BLD AUTO: 247 K/UL (ref 150–350)
PMV BLD AUTO: 10.6 FL (ref 9.2–12.9)
POTASSIUM SERPL-SCNC: 4.1 MMOL/L (ref 3.5–5.1)
PREALB SERPL-MCNC: 18 MG/DL (ref 20–43)
PROT SERPL-MCNC: 6.8 G/DL (ref 6–8.4)
RBC # BLD AUTO: 3.95 M/UL (ref 4–5.4)
SATURATED IRON: 16 % (ref 20–50)
SODIUM SERPL-SCNC: 140 MMOL/L (ref 136–145)
T4 FREE SERPL-MCNC: 0.76 NG/DL (ref 0.71–1.51)
TOTAL IRON BINDING CAPACITY: 326 UG/DL (ref 250–450)
TRANSFERRIN SERPL-MCNC: 220 MG/DL (ref 200–375)
TRIGL SERPL-MCNC: 213 MG/DL (ref 30–150)
TSH SERPL DL<=0.005 MIU/L-ACNC: 4.19 UIU/ML (ref 0.4–4)
VIT B12 SERPL-MCNC: 409 PG/ML (ref 210–950)
WBC # BLD AUTO: 6.1 K/UL (ref 3.9–12.7)

## 2020-06-09 PROCEDURE — 82607 VITAMIN B-12: CPT

## 2020-06-09 PROCEDURE — 36415 COLL VENOUS BLD VENIPUNCTURE: CPT

## 2020-06-09 PROCEDURE — 84443 ASSAY THYROID STIM HORMONE: CPT

## 2020-06-09 PROCEDURE — 85025 COMPLETE CBC W/AUTO DIFF WBC: CPT

## 2020-06-09 PROCEDURE — 80053 COMPREHEN METABOLIC PANEL: CPT

## 2020-06-09 PROCEDURE — 83540 ASSAY OF IRON: CPT

## 2020-06-09 PROCEDURE — 82306 VITAMIN D 25 HYDROXY: CPT

## 2020-06-09 PROCEDURE — 82728 ASSAY OF FERRITIN: CPT

## 2020-06-09 PROCEDURE — 83036 HEMOGLOBIN GLYCOSYLATED A1C: CPT

## 2020-06-09 PROCEDURE — 83735 ASSAY OF MAGNESIUM: CPT

## 2020-06-09 PROCEDURE — 84134 ASSAY OF PREALBUMIN: CPT

## 2020-06-09 PROCEDURE — 84439 ASSAY OF FREE THYROXINE: CPT

## 2020-06-09 PROCEDURE — 80061 LIPID PANEL: CPT

## 2020-06-16 ENCOUNTER — OFFICE VISIT (OUTPATIENT)
Dept: FAMILY MEDICINE | Facility: CLINIC | Age: 65
End: 2020-06-16
Payer: MEDICARE

## 2020-06-16 VITALS
DIASTOLIC BLOOD PRESSURE: 76 MMHG | WEIGHT: 114.19 LBS | BODY MASS INDEX: 23.02 KG/M2 | HEART RATE: 75 BPM | SYSTOLIC BLOOD PRESSURE: 128 MMHG | OXYGEN SATURATION: 98 % | HEIGHT: 59 IN

## 2020-06-16 DIAGNOSIS — I10 BENIGN ESSENTIAL HYPERTENSION: ICD-10-CM

## 2020-06-16 DIAGNOSIS — F32.A ANXIETY AND DEPRESSION: ICD-10-CM

## 2020-06-16 DIAGNOSIS — Z01.419 ENCOUNTER FOR ROUTINE GYNECOLOGICAL EXAMINATION WITH PAPANICOLAOU SMEAR OF CERVIX: Primary | ICD-10-CM

## 2020-06-16 DIAGNOSIS — F41.9 ANXIETY AND DEPRESSION: ICD-10-CM

## 2020-06-16 DIAGNOSIS — Z87.891 FORMER SMOKER: ICD-10-CM

## 2020-06-16 DIAGNOSIS — R10.13 EPIGASTRIC PAIN: ICD-10-CM

## 2020-06-16 DIAGNOSIS — R87.810 PAPANICOLAOU SMEAR OF CERVIX WITH POSITIVE HIGH RISK HUMAN PAPILLOMA VIRUS (HPV) TEST: ICD-10-CM

## 2020-06-16 DIAGNOSIS — F10.11 HISTORY OF ALCOHOL ABUSE: ICD-10-CM

## 2020-06-16 DIAGNOSIS — D48.5 NEOPLASM OF UNCERTAIN BEHAVIOR OF SKIN: ICD-10-CM

## 2020-06-16 DIAGNOSIS — Z78.0 POSTMENOPAUSAL: ICD-10-CM

## 2020-06-16 DIAGNOSIS — M81.0 POSTMENOPAUSAL OSTEOPOROSIS: ICD-10-CM

## 2020-06-16 DIAGNOSIS — E55.9 VITAMIN D DEFICIENCY: ICD-10-CM

## 2020-06-16 DIAGNOSIS — E78.2 MIXED HYPERLIPIDEMIA: ICD-10-CM

## 2020-06-16 DIAGNOSIS — E44.1 PROTEIN-CALORIE MALNUTRITION, MILD: ICD-10-CM

## 2020-06-16 DIAGNOSIS — Z86.73 HISTORY OF STROKE: ICD-10-CM

## 2020-06-16 DIAGNOSIS — R09.82 POST-NASAL DRIP: ICD-10-CM

## 2020-06-16 DIAGNOSIS — Z87.898 HISTORY OF SEIZURES: ICD-10-CM

## 2020-06-16 DIAGNOSIS — Z01.00 ROUTINE EYE EXAM: ICD-10-CM

## 2020-06-16 PROCEDURE — 99214 PR OFFICE/OUTPT VISIT, EST, LEVL IV, 30-39 MIN: ICD-10-PCS | Mod: 25,S$GLB,, | Performed by: FAMILY MEDICINE

## 2020-06-16 PROCEDURE — 99499 RISK ADDL DX/OHS AUDIT: ICD-10-PCS | Mod: S$GLB,,, | Performed by: FAMILY MEDICINE

## 2020-06-16 PROCEDURE — 99214 OFFICE O/P EST MOD 30 MIN: CPT | Mod: 25,S$GLB,, | Performed by: FAMILY MEDICINE

## 2020-06-16 PROCEDURE — 3078F PR MOST RECENT DIASTOLIC BLOOD PRESSURE < 80 MM HG: ICD-10-PCS | Mod: CPTII,S$GLB,, | Performed by: FAMILY MEDICINE

## 2020-06-16 PROCEDURE — 87624 HPV HI-RISK TYP POOLED RSLT: CPT

## 2020-06-16 PROCEDURE — 3078F DIAST BP <80 MM HG: CPT | Mod: CPTII,S$GLB,, | Performed by: FAMILY MEDICINE

## 2020-06-16 PROCEDURE — 88175 CYTOPATH C/V AUTO FLUID REDO: CPT

## 2020-06-16 PROCEDURE — 99999 PR PBB SHADOW E&M-EST. PATIENT-LVL V: ICD-10-PCS | Mod: PBBFAC,,, | Performed by: FAMILY MEDICINE

## 2020-06-16 PROCEDURE — 99999 PR PBB SHADOW E&M-EST. PATIENT-LVL V: CPT | Mod: PBBFAC,,, | Performed by: FAMILY MEDICINE

## 2020-06-16 PROCEDURE — 3074F PR MOST RECENT SYSTOLIC BLOOD PRESSURE < 130 MM HG: ICD-10-PCS | Mod: CPTII,S$GLB,, | Performed by: FAMILY MEDICINE

## 2020-06-16 PROCEDURE — 99499 UNLISTED E&M SERVICE: CPT | Mod: S$GLB,,, | Performed by: FAMILY MEDICINE

## 2020-06-16 PROCEDURE — 3074F SYST BP LT 130 MM HG: CPT | Mod: CPTII,S$GLB,, | Performed by: FAMILY MEDICINE

## 2020-06-16 RX ORDER — ALPRAZOLAM 0.5 MG/1
TABLET ORAL
Qty: 30 TABLET | Refills: 5 | Status: SHIPPED | OUTPATIENT
Start: 2020-06-16 | End: 2020-07-14 | Stop reason: SDUPTHER

## 2020-06-16 RX ORDER — LEVOCETIRIZINE DIHYDROCHLORIDE 5 MG/1
5 TABLET, FILM COATED ORAL NIGHTLY
Qty: 30 TABLET | Refills: 11 | Status: SHIPPED | OUTPATIENT
Start: 2020-06-16 | End: 2021-02-22 | Stop reason: ALTCHOICE

## 2020-06-16 RX ORDER — LISINOPRIL 10 MG/1
10 TABLET ORAL DAILY
Qty: 90 TABLET | Refills: 4 | Status: SHIPPED | OUTPATIENT
Start: 2020-06-16 | End: 2020-08-07 | Stop reason: SDUPTHER

## 2020-06-16 RX ORDER — ATORVASTATIN CALCIUM 80 MG/1
80 TABLET, FILM COATED ORAL DAILY
Qty: 90 TABLET | Refills: 3 | Status: SHIPPED | OUTPATIENT
Start: 2020-06-16 | End: 2020-09-30 | Stop reason: SDUPTHER

## 2020-06-16 RX ORDER — OMEPRAZOLE 20 MG/1
20 CAPSULE, DELAYED RELEASE ORAL DAILY
Qty: 90 CAPSULE | Refills: 3 | Status: SHIPPED | OUTPATIENT
Start: 2020-06-16 | End: 2021-02-22 | Stop reason: ALTCHOICE

## 2020-06-16 NOTE — PROGRESS NOTES
Office Visit    Patient Name: Gena Lui    : 1955  MRN: [id number]    Subjective:  Gena is a 64 y.o. female who presents today for:    Follow-up and Gynecologic Exam    63 yo female patient of mine who presents today for annual exam and monitoring of chronic conditions that include hypertension, hyperlipidemia with hypertriglyceridemia improving with Lipitor), history of seizures now controlled on Keppra, history of stroke, anxiety/depression, osteoporosis with vitamin-D deficiency.    She also has a heavy tobacco use history, and previous history of alcohol abuse-though the alcohol abuse has been much improved and she has not had any alcoholic drinks to speak of over the last couple of years-- only 3 oz glass of wine with dinner only.   Smoking reduced to 5 cigarettes per day about-- working with smoking cessation.  SHE QUIT SINCE May 1, 2020.     Labs drawn prior to office visit on  remarkable for mildly elevated TSH of 4+ with normal free T4 and history of negative TPO antibody test, triglycerides stable at 213 w/ LDL 42 on lipitor 40,  Vitamin-D level has normalized 56 on weekly supplementation/mg & B12 normal, CBC, A1c, CMP unremarkable.   she has borderline anemia but iron studies are normal.  Pre albumin is mildly low at 18.   She is recently status post repair of umbilical hernia on 2019.      She is currently living alone but close to her grandchildren at her home in [facility].      She is postmenopausal.  She had a Pap test with me last year 04/10/2019 that showed normal cells but positive HPV virus so she is having a repeat Pap today.     They have been feeling overall well-- mood stable off of wellbutrin and prozac.  Still takes Xanax a few times a week as needed.  Watches how much she takes it to make sure it is not every day.  Feels that she does not need to be back on a daily SSRI.  Reports some irregular bowel movements with some abdominal distension and epigastric pain.  She  is status post hernia repair and feels that when she has to go to the bathroom she has some increased pressure in her periumbilical region.  Her bowel movements are not regular and she does suffer with some fairly frequent constipation.  She has a stool softener that she takes as needed but nothing daily.  Her diet is overall poor and she does not eat much fiber.  Her epigastric pain sometimes concerns her that it might be her heart.  Her exercise tolerance is overall stable-she can walk around the neighborhood without problems.  The pain is never severe.  She has an associated cough and postnasal drip-she describes the cough postnasal drip as clear discharge that she will sometimes cough up.  She is taking Flonase for allergies and this does not help the postnasal drainage, but she is not on a daily antihistamine.     General lifestyle habits are as follows:  Diet is described as poor-- eats generally only 1 meal per day, exercise is described as fair-- walking in the neighborhood pretty regularly, sleep is described as fair- off sleeping medications . Weight is improved to BMI of 23-- recently stable there.     Immunizations: TDaP 7/13/2014, FLU UTD, Pneumovax 23 10/5/2018(smoker), SHINGRIX completed 10/5/2018     Screening Tests: PAP/ HPV 4/10/2019- repeat today 6/16/20 2/2 HPV +, mammogram scheduled for 6/18/20, colonoscopy/19/2018-- repeat 5 years, hep C screening (-) 3/21/2018, DEXA  3/23/2018-- osteoporosis, VIT D stared and repeat 2 years (DECLINED PROLIA)-- ORDERED     Eye/Dental: Eye 6/5/2018 Dr Briceno optometry, Dental DUE and trying to schedule       Past Medical History  Past Medical History:   Diagnosis Date    Anxiety and depression 3/21/2018    Benign essential hypertension 3/21/2018    Chronic bilateral low back pain without sciatica 3/21/2018    History of stroke 3/21/2018    Memory loss     Mixed hyperlipidemia 3/21/2018    Seizures     Tobacco abuse 3/21/2018       Past Surgical  History  Past Surgical History:   Procedure Laterality Date    ANTERIOR VAGINAL REPAIR      CATARACT EXTRACTION Right 10/16/2007    Dr. Ramsey //y[facility] 18 Dr. Bentley    CATARACT EXTRACTION W/  INTRAOCULAR LENS IMPLANT Left 10/30/2007    Dr. Ramsey// yag 18 Dr. Bentley    CHOLECYSTECTOMY      COLONOSCOPY N/A 2018    Procedure: COLONOSCOPY;  Surgeon: Yi Goncalves MD;  [facility];  Service: Endoscopy;  Laterality: N/A;    EYE SURGERY      TONSILLECTOMY      UMBILICAL HERNIA REPAIR N/A 2019    Procedure: REPAIR, HERNIA, UMBILICAL, AGE 5 YEARS OR OLDER, open with/without mesh;  Surgeon: Magno Mishra MD;  Location: [facility] OR [address] [place] [address];  Service: General;  Laterality: N/A;       Family History  Family History   Problem Relation Age of Onset    Blindness Neg Hx     Glaucoma Neg Hx     Macular degeneration Neg Hx     Retinal detachment Neg Hx        Social History  Social History     Socioeconomic History    Marital status:      Spouse name: Not on file    Number of children: Not on file    Years of education: Not on file    Highest education level: Not on file   Occupational History    Not on file   Social Needs    Financial resource strain: Not on file    Food insecurity     Worry: Not on file     Inability: Not on file    Transportation needs     Medical: Not on file     Non-medical: Not on file   Tobacco Use    Smoking status: Former Smoker     Types: Cigarettes     Quit date: 2019     Years since quittin.1    Smokeless tobacco: Never Used    Tobacco comment: 1 PPD   Substance and Sexual Activity    Alcohol use: Yes     Comment: 1-2 drinks daily    Drug use: Yes     Types: Marijuana     Comment: intermittent to take the edge off of her anxiety    Sexual activity: Never   Lifestyle    Physical activity     Days per week: Not on file     Minutes per session: Not on file    Stress: Not on file   Relationships    Social connections     Talks on phone:  "Not on file     Gets together: Not on file     Attends Spiritism service: Not on file     Active member of club or organization: Not on file     Attends meetings of clubs or organizations: Not on file     Relationship status: Not on file   Other Topics Concern    Not on file   Social History Narrative    Not on file       Current Medications  Medications reviewed and updated.     Allergies   Review of patient's allergies indicates:   Allergen Reactions    Versed [midazolam]      IV Versed altered mental status       Review of Systems (Pertinent positives)  Review of Systems   Constitutional: Negative for appetite change (chronically poor appetite) and unexpected weight change.   HENT: Positive for hearing loss and postnasal drip.    Respiratory: Negative for shortness of breath.    Cardiovascular: Negative for chest pain and leg swelling.   Gastrointestinal: Positive for abdominal pain (epigastric and periumbilical) and constipation.   Genitourinary: Negative for difficulty urinating.   Skin: Negative for rash.   Allergic/Immunologic: Positive for environmental allergies.   Neurological: Negative for dizziness and light-headedness.   Psychiatric/Behavioral: The patient is nervous/anxious.        /76   Pulse 75   Ht 4' 11" (1.499 m)   Wt 51.8 kg (114 lb 3.2 oz)   LMP  (LMP Unknown)   SpO2 98%   BMI 23.07 kg/m²     Physical Exam  Vitals signs reviewed.   Constitutional:       General: She is not in acute distress.     Appearance: She is well-developed.   HENT:      Head: Normocephalic and atraumatic.      Right Ear: Ear canal normal. Tympanic membrane is not erythematous or bulging.      Left Ear: Ear canal normal. Tympanic membrane is not erythematous or bulging.      Mouth/Throat:      Pharynx: No oropharyngeal exudate.   Eyes:      Conjunctiva/sclera: Conjunctivae normal.   Neck:      Thyroid: No thyroid mass or thyromegaly.      Vascular: No carotid bruit.   Cardiovascular:      Rate and Rhythm: " Normal rate and regular rhythm.      Pulses:           Dorsalis pedis pulses are 2+ on the right side and 2+ on the left side.      Heart sounds: Normal heart sounds. No murmur.   Pulmonary:      Effort: No respiratory distress.      Breath sounds: Normal breath sounds.   Abdominal:      General: Bowel sounds are normal. There is no distension.      Palpations: Abdomen is soft. There is no mass.      Tenderness: There is abdominal tenderness in the epigastric area and periumbilical area. There is no guarding or rebound.      Hernia: A hernia (mid line hernia, s/p repair mild bulging of this area with valsalva) is present.   Genitourinary:     Exam position: Supine.      Labia:         Right: No rash or lesion.         Left: No rash or lesion.       Vagina: Normal.      Cervix: No cervical motion tenderness or discharge.      Adnexa:         Right: No mass or tenderness.          Left: No mass or tenderness.     Musculoskeletal: Normal range of motion.   Lymphadenopathy:      Cervical: No cervical adenopathy.   Skin:     Findings: No rash.   Neurological:      Mental Status: She is alert and oriented to person, place, and time.           Assessment/Plan:  Gena Lui is a 64 y.o. female who presents today for :    Gena was seen today for follow-up and gynecologic exam.    Diagnoses and all orders for this visit:    Encounter for routine gynecological examination with Papanicolaou smear of cervix  Comments:  HEALTH MAINTENANCE REVIEWED: PAP/HPV TODAY, IMMUNIZATIONS UTD, BONE DENSITY AND MAMMOGRAM PENDING, CSCOPE UTD. ADVISED ON DIET/EXERCISE/SLEEP, EYE/DENTAL  Orders:  -     Liquid-Based Pap Smear, Screening  -     HPV High Risk Genotypes, PCR  -     Vitamin D; Future  -     Lipid Panel; Future  -     Comprehensive metabolic panel; Future  -     CBC auto differential; Future  -     TSH; Future  -     Prealbumin; Future    Papanicolaou smear of cervix with positive high risk human papilloma virus (HPV)  test  Comments:  Pap with HPV repeated today secondary to positive HPV Pap test last year April 2019    Postmenopausal  -     DXA Bone Density Spine And Hip; Future    Postmenopausal osteoporosis  Comments:  Intolerant of bisphosphonates but declined Prolia as it is injection, taking calcium/vitamin-D, recheck DEXA-open to Prolia if no improvement  Orders:  -     DXA Bone Density Spine And Hip; Future  -     Vitamin D; Future    Routine eye exam  -     Ambulatory referral/consult to Optometry; Future    Benign essential hypertension  Comments:  Controlled on lisinopril 10 mg daily, ongoing monitoring  Orders:  -     Lipid Panel; Future  -     Comprehensive metabolic panel; Future  -     CBC auto differential; Future  -     TSH; Future  -     atorvastatin (LIPITOR) 80 MG tablet; Take 1 tablet (80 mg total) by mouth once daily.  -     lisinopriL 10 MG tablet; Take 1 tablet (10 mg total) by mouth once daily.    Mixed hyperlipidemia  Comments:  Significant improvement on Lipitor 80, LDL at goal, triglycerides mildly elevated in the 200s-discussed lifestyle changes to help with triglycerides  Orders:  -     Lipid Panel; Future  -     Comprehensive metabolic panel; Future  -     atorvastatin (LIPITOR) 80 MG tablet; Take 1 tablet (80 mg total) by mouth once daily.    Former smoker  Comments:  Quit May 2020, urged to continue complete cessation    History of alcohol abuse  Comments:  Now drinks only very sporadically, has not had a drink in the last week.    History of stroke  Comments:  Secondary prevention with aspirin, statin, blood pressure control, has quit smoking.    History of seizures  Comments:  Well controlled on Keppra, no acute concerns    Anxiety and depression  Comments:  Now off of daily Wellbutrin and Prozac, continues to take Xanax here and there, ongoing monitoring-restart SSRI if Xanax use increases  Orders:  -     ALPRAZolam (XANAX) 0.5 MG tablet; TAKE 1 TABLET BY MOUTH EVERY DAY AS NEEDED FOR INSOMNIA  OR ANXIETY (PANIC ATTACK)    Vitamin D deficiency  Comments:  Has normalized on weekly vitamin-D, ongoing monitoring  Orders:  -     Vitamin D; Future    Protein-calorie malnutrition, mild  Comments:  Counseled again on the importance of a balanced diet.  At this time since pre albumin is low, advised to add protein supplement shake between meals  Orders:  -     Prealbumin; Future    Post-nasal drip  Comments:  Add daily antihistamine such as Xyzal to her Flonase  Orders:  -     levocetirizine (XYZAL) 5 MG tablet; Take 1 tablet (5 mg total) by mouth every evening.    Epigastric pain  Comments:  Suspect GERD, trial of Prilosec 20 daily to see if her symptoms improve, this may also help her throat tickle/postnasal drip symptoms, notify if pain persists  Orders:  -     omeprazole (PRILOSEC) 20 MG capsule; Take 1 capsule (20 mg total) by mouth once daily.    Neoplasm of uncertain behavior of skin  Comments:  Now following with to me treat Dermatology, pathology is pending on some lesions that they excised for biopsy            ICD-10-CM ICD-9-CM    1. Encounter for routine gynecological examination with Papanicolaou smear of cervix  Z01.419 V72.31 Liquid-Based Pap Smear, Screening     V76.2 HPV High Risk Genotypes, PCR      Vitamin D      Lipid Panel      Comprehensive metabolic panel      CBC auto differential      TSH      Prealbumin    HEALTH MAINTENANCE REVIEWED: PAP/HPV TODAY, IMMUNIZATIONS UTD, BONE DENSITY AND MAMMOGRAM PENDING, CSCOPE UTD. ADVISED ON DIET/EXERCISE/SLEEP, EYE/DENTAL   2. Papanicolaou smear of cervix with positive high risk human papilloma virus (HPV) test  R87.810 795.05     Pap with HPV repeated today secondary to positive HPV Pap test last year April 2019   3. Postmenopausal  Z78.0 V49.81 DXA Bone Density Spine And Hip   4. Postmenopausal osteoporosis  M81.0 733.01 DXA Bone Density Spine And Hip      Vitamin D    Intolerant of bisphosphonates but declined Prolia as it is injection, taking  calcium/vitamin-D, recheck DEXA-open to Prolia if no improvement   5. Routine eye exam  Z01.00 V72.0 Ambulatory referral/consult to Optometry   6. Benign essential hypertension  I10 401.1 Lipid Panel      Comprehensive metabolic panel      CBC auto differential      TSH      atorvastatin (LIPITOR) 80 MG tablet      lisinopriL 10 MG tablet    Controlled on lisinopril 10 mg daily, ongoing monitoring   7. Mixed hyperlipidemia  E78.2 272.2 Lipid Panel      Comprehensive metabolic panel      atorvastatin (LIPITOR) 80 MG tablet    Significant improvement on Lipitor 80, LDL at goal, triglycerides mildly elevated in the 200s-discussed lifestyle changes to help with triglycerides   8. Former smoker  Z87.891 V15.82     Quit May 2020, urged to continue complete cessation   9. History of alcohol abuse  F10.11 305.03     Now drinks only very sporadically, has not had a drink in the last week.   10. History of stroke  Z86.73 V12.54     Secondary prevention with aspirin, statin, blood pressure control, has quit smoking.   11. History of seizures  Z87.898 V12.49     Well controlled on Keppra, no acute concerns   12. Anxiety and depression  F41.9 300.00 ALPRAZolam (XANAX) 0.5 MG tablet    F32.9 311     Now off of daily Wellbutrin and Prozac, continues to take Xanax here and there, ongoing monitoring-restart SSRI if Xanax use increases   13. Vitamin D deficiency  E55.9 268.9 Vitamin D    Has normalized on weekly vitamin-D, ongoing monitoring   14. Protein-calorie malnutrition, mild  E44.1 263.1 Prealbumin    Counseled again on the importance of a balanced diet.  At this time since pre albumin is low, advised to add protein supplement shake between meals   15. Post-nasal drip  R09.82 784.91 levocetirizine (XYZAL) 5 MG tablet    Add daily antihistamine such as Xyzal to her Flonase   16. Epigastric pain  R10.13 789.06 omeprazole (PRILOSEC) 20 MG capsule    Suspect GERD, trial of Prilosec 20 daily to see if her symptoms improve, this may  also help her throat tickle/postnasal drip symptoms, notify if pain persists   17. Neoplasm of uncertain behavior of skin  D48.5 238.2     Now following with to me treat Dermatology, pathology is pending on some lesions that they excised for biopsy       Patient Instructions   TRIAL OF BENE FIBER FIBER SUPPLEMENT DAILY WITH WATER    CONTINUE STOOL SOFTENER DAILY     START BOOST OR OTHER PROTEIN SUPPLEMENT SHAKE DAILY BETWEEN MEALS DUE TO POOR MEAL CONSISTENTCY AND TRY TO EAT MORE VARIETY AND CONSISTENCY.         Follow up in about 6 months (around 12/16/2020) for to follow up on lab results, return as needed for new concerns.

## 2020-06-16 NOTE — PATIENT INSTRUCTIONS
TRIAL OF BENE FIBER FIBER SUPPLEMENT DAILY WITH WATER    CONTINUE STOOL SOFTENER DAILY     START BOOST OR OTHER PROTEIN SUPPLEMENT SHAKE DAILY BETWEEN MEALS DUE TO POOR MEAL CONSISTENTCY AND TRY TO EAT MORE VARIETY AND CONSISTENCY.

## 2020-06-18 ENCOUNTER — HOSPITAL ENCOUNTER (OUTPATIENT)
Dept: RADIOLOGY | Facility: HOSPITAL | Age: 65
Discharge: HOME | End: 2020-06-18
Attending: FAMILY MEDICINE
Payer: MEDICARE

## 2020-06-18 DIAGNOSIS — Z78.0 POSTMENOPAUSAL: ICD-10-CM

## 2020-06-18 DIAGNOSIS — Z12.31 ENCOUNTER FOR SCREENING MAMMOGRAM FOR BREAST CANCER: ICD-10-CM

## 2020-06-18 DIAGNOSIS — M81.0 POSTMENOPAUSAL OSTEOPOROSIS: ICD-10-CM

## 2020-06-18 PROCEDURE — 77067 MAMMO DIGITAL SCREENING BILAT WITH TOMOSYNTHESIS_CAD: ICD-10-PCS | Mod: 26,,, | Performed by: RADIOLOGY

## 2020-06-18 PROCEDURE — 77080 DXA BONE DENSITY AXIAL: CPT | Mod: 26,,, | Performed by: RADIOLOGY

## 2020-06-18 PROCEDURE — 77063 BREAST TOMOSYNTHESIS BI: CPT | Mod: 26,,, | Performed by: RADIOLOGY

## 2020-06-18 PROCEDURE — 77080 DEXA BONE DENSITY SPINE HIP: ICD-10-PCS | Mod: 26,,, | Performed by: RADIOLOGY

## 2020-06-18 PROCEDURE — 77067 SCR MAMMO BI INCL CAD: CPT | Mod: TC

## 2020-06-18 PROCEDURE — 77080 DXA BONE DENSITY AXIAL: CPT | Mod: TC

## 2020-06-18 PROCEDURE — 77067 SCR MAMMO BI INCL CAD: CPT | Mod: 26,,, | Performed by: RADIOLOGY

## 2020-06-18 PROCEDURE — 77063 MAMMO DIGITAL SCREENING BILAT WITH TOMOSYNTHESIS_CAD: ICD-10-PCS | Mod: 26,,, | Performed by: RADIOLOGY

## 2020-06-20 ENCOUNTER — TELEPHONE (OUTPATIENT)
Dept: FAMILY MEDICINE | Facility: CLINIC | Age: 65
End: 2020-06-20

## 2020-06-20 DIAGNOSIS — M81.0 POSTMENOPAUSAL OSTEOPOROSIS: ICD-10-CM

## 2020-06-22 ENCOUNTER — TELEPHONE (OUTPATIENT)
Dept: FAMILY MEDICINE | Facility: CLINIC | Age: 65
End: 2020-06-22

## 2020-06-22 LAB
FINAL PATHOLOGIC DIAGNOSIS: NORMAL
Lab: NORMAL

## 2020-06-22 NOTE — TELEPHONE ENCOUNTER
Called patient to notify that--    Please notify that her bone density has improved a little with calcium and vitamin D. She should continue these along with regular walking to continue to optimize her bone density and we will recheck it in 2 years. LFM     Patient verbalized understanding.

## 2020-06-23 ENCOUNTER — TELEPHONE (OUTPATIENT)
Dept: FAMILY MEDICINE | Facility: CLINIC | Age: 65
End: 2020-06-23

## 2020-06-23 LAB
HPV HR 12 DNA SPEC QL NAA+PROBE: NEGATIVE
HPV16 AG SPEC QL: NEGATIVE
HPV18 DNA SPEC QL NAA+PROBE: NEGATIVE

## 2020-06-23 NOTE — TELEPHONE ENCOUNTER
Called to notify that--    Please notify that repeat Pap this year shows clearance of the HPV.  She is no longer testing positive for the HPV virus and her Pap is normal.  She does not require any ongoing Pap testing, [facility]    Patient verbalized understanding.

## 2020-06-26 ENCOUNTER — TELEPHONE (OUTPATIENT)
Dept: FAMILY MEDICINE | Facility: CLINIC | Age: 65
End: 2020-06-26

## 2020-06-26 LAB
HCV AB SER-ACNC: NEGATIVE
HIV 1/O/2 ABS, QUAL: NON REACTIVE
SARS-COV-2 ANTIBODIES: NEGATIVE

## 2020-06-29 NOTE — TELEPHONE ENCOUNTER
----- Message from Bijan Herrera sent at 6/26/2020  9:47 AM CDT -----  Type:  Needs Medical Advice    Who Called: self  Reason:Has a question about her baby aspirin. Found out she has a form of skin cancer. Surgery is July 8th and they dont want her on it for some time. Needs to find out if it is ok if she goes off of it for at least 2 weeks   Would the patient rather a call back or a response via [facility]? callback  Best Call Back Number: [phone]  Additional Information:none    
Informed pt that she is fine for her to stop her baby aspirin for her skin cancer procedure-she should resume it as soon as possible when the procedure is complete and she is not having any bleeding. Pt verbalized understanding.   
It is fine for her to stop her baby aspirin for her skin cancer procedure-she should resume it as soon as possible when the procedure is complete and she is not having any bleeding. RODRIGO  
Patient recently found out that she has skin cancer and is having a procedure.  She was advised to stay of of baby aspirin for 2 weeks.  Patient is wanting to make sure that you are ok with her holding her baby aspirin for two weeks.  
surgery

## 2020-07-06 ENCOUNTER — TELEPHONE (OUTPATIENT)
Dept: FAMILY MEDICINE | Facility: CLINIC | Age: 65
End: 2020-07-06

## 2020-07-06 NOTE — TELEPHONE ENCOUNTER
Spoke to pt and states that her daughter told her she has pink eye and a stye. Informed pt that she would have to come in for an appt. Pt was offered an appt, but declined. Stated that she had old medication and she has been using with warm compress.

## 2020-07-06 NOTE — TELEPHONE ENCOUNTER
----- Message from Angi Interiano sent at 7/6/2020  8:36 AM CDT -----  Contact: [phone]/Self  Type:  Needs Medical Advice    Who Called: Pt  Symptoms (please be specific): Pink eye    How long has patient had these symptoms:  Yesterday   Pharmacy name and phone #:  -  Would the patient rather a call back or a response via [facility]? Call back   Best Call Back Number: [phone]  Additional Information:

## 2020-07-14 DIAGNOSIS — F32.A ANXIETY AND DEPRESSION: ICD-10-CM

## 2020-07-14 DIAGNOSIS — F41.9 ANXIETY AND DEPRESSION: ICD-10-CM

## 2020-07-14 RX ORDER — ALPRAZOLAM 0.5 MG/1
TABLET ORAL
Qty: 30 TABLET | Refills: 5 | Status: SHIPPED | OUTPATIENT
Start: 2020-07-14 | End: 2021-01-22 | Stop reason: SDUPTHER

## 2020-07-14 NOTE — TELEPHONE ENCOUNTER
----- Message from Bijan Herrera sent at 7/14/2020 10:20 AM CDT -----  Type:  Needs Medical Advice    Who Called: self  Reason:having trouble getting her medication refilled   Would the patient rather a call back or a response via [facility]? callback  Best Call Back Number: [phone]  Additional Information:

## 2020-07-14 NOTE — TELEPHONE ENCOUNTER
----- Message from Angi Interiano sent at 7/14/2020  4:35 PM CDT -----  Contact: [phone]/Self  Type:  Patient Returning Call    Who Called:Pt  Who Left Message for Patient: Clarita   Does the patient know what this is regarding?:medication   Would the patient rather a call back or a response via [facility]? Call back   Best Call Back Number:[phone]  Additional Information:

## 2020-08-07 DIAGNOSIS — I10 BENIGN ESSENTIAL HYPERTENSION: ICD-10-CM

## 2020-08-07 RX ORDER — LISINOPRIL 10 MG/1
10 TABLET ORAL DAILY
Qty: 90 TABLET | Refills: 4 | Status: SHIPPED | OUTPATIENT
Start: 2020-08-07 | End: 2021-08-23 | Stop reason: SDUPTHER

## 2020-08-07 NOTE — TELEPHONE ENCOUNTER
----- Message from Дмитрий Majano sent at 8/7/2020  3:16 PM CDT -----  Contact: [phone] / self  Patient would like a refill for the medication lisinopriL 10 MG tablet, Pharmacy is [facility] [address] Patient states she is completely out. Please Advise.

## 2020-09-30 DIAGNOSIS — I10 BENIGN ESSENTIAL HYPERTENSION: ICD-10-CM

## 2020-09-30 DIAGNOSIS — E78.2 MIXED HYPERLIPIDEMIA: ICD-10-CM

## 2020-09-30 RX ORDER — ATORVASTATIN CALCIUM 80 MG/1
80 TABLET, FILM COATED ORAL DAILY
Qty: 90 TABLET | Refills: 3 | Status: SHIPPED | OUTPATIENT
Start: 2020-09-30 | End: 2021-08-23 | Stop reason: SDUPTHER

## 2020-09-30 NOTE — TELEPHONE ENCOUNTER
----- Message from Stephanie Melendez sent at 9/30/2020  9:42 AM CDT -----  Patient is following up on a refill request for atorvastatin (LIPITOR) 80 MG tablet. [facility] [address]. Please call and advise.

## 2020-12-22 ENCOUNTER — TELEPHONE (OUTPATIENT)
Dept: FAMILY MEDICINE | Facility: CLINIC | Age: 65
End: 2020-12-22

## 2020-12-22 NOTE — TELEPHONE ENCOUNTER
----- Message from Clarita Frazier sent at 12/22/2020  8:37 AM CST -----  Contact: patient  Type:  Needs Medical Advice    Who Called: Patient  Symptoms (please be specific):  possible infection   How long has patient had these symptoms:   2 weeks   Pharmacy name and phone #:   [id number] [place] - [address] [phone] (Phone)  [phone] (Fax)  Would the patient rather a call back or a response via [facility]?  Call   Best Call Back Number:  [phone]  Additional Information:

## 2020-12-24 ENCOUNTER — OFFICE VISIT (OUTPATIENT)
Dept: FAMILY MEDICINE | Facility: CLINIC | Age: 65
End: 2020-12-24
Payer: MEDICARE

## 2020-12-24 VITALS
HEIGHT: 59 IN | DIASTOLIC BLOOD PRESSURE: 82 MMHG | SYSTOLIC BLOOD PRESSURE: 134 MMHG | HEART RATE: 85 BPM | WEIGHT: 102.5 LBS | BODY MASS INDEX: 20.66 KG/M2 | OXYGEN SATURATION: 98 % | TEMPERATURE: 99 F

## 2020-12-24 DIAGNOSIS — C44.320 SQUAMOUS CELL CARCINOMA, FACE: ICD-10-CM

## 2020-12-24 DIAGNOSIS — F32.A ANXIETY AND DEPRESSION: ICD-10-CM

## 2020-12-24 DIAGNOSIS — Z11.4 ENCOUNTER FOR SCREENING FOR HUMAN IMMUNODEFICIENCY VIRUS (HIV): ICD-10-CM

## 2020-12-24 DIAGNOSIS — N89.8 VAGINAL DISCHARGE: Primary | ICD-10-CM

## 2020-12-24 DIAGNOSIS — Z11.59 ENCOUNTER FOR HEPATITIS C SCREENING TEST FOR LOW RISK PATIENT: ICD-10-CM

## 2020-12-24 DIAGNOSIS — F41.9 ANXIETY AND DEPRESSION: ICD-10-CM

## 2020-12-24 DIAGNOSIS — R30.0 DYSURIA: ICD-10-CM

## 2020-12-24 LAB
BILIRUB SERPL-MCNC: NEGATIVE MG/DL
BLOOD URINE, POC: ABNORMAL
CLARITY, POC UA: CLEAR
COLOR, POC UA: YELLOW
GLUCOSE UR QL STRIP: NORMAL
KETONES UR QL STRIP: NEGATIVE
LEUKOCYTE ESTERASE URINE, POC: ABNORMAL
NITRITE, POC UA: NEGATIVE
PH, POC UA: 7
PROTEIN, POC: ABNORMAL
SPECIFIC GRAVITY, POC UA: 1.01
UROBILINOGEN, POC UA: NORMAL

## 2020-12-24 PROCEDURE — 3079F DIAST BP 80-89 MM HG: CPT | Mod: CPTII,S$GLB,, | Performed by: INTERNAL MEDICINE

## 2020-12-24 PROCEDURE — 99999 PR PBB SHADOW E&M-EST. PATIENT-LVL III: CPT | Mod: PBBFAC,,, | Performed by: INTERNAL MEDICINE

## 2020-12-24 PROCEDURE — 99499 UNLISTED E&M SERVICE: CPT | Mod: S$GLB,,, | Performed by: INTERNAL MEDICINE

## 2020-12-24 PROCEDURE — 87624 HPV HI-RISK TYP POOLED RSLT: CPT

## 2020-12-24 PROCEDURE — 99214 PR OFFICE/OUTPT VISIT, EST, LEVL IV, 30-39 MIN: ICD-10-PCS | Mod: 25,S$GLB,, | Performed by: INTERNAL MEDICINE

## 2020-12-24 PROCEDURE — 3075F PR MOST RECENT SYSTOLIC BLOOD PRESS GE 130-139MM HG: ICD-10-PCS | Mod: CPTII,S$GLB,, | Performed by: INTERNAL MEDICINE

## 2020-12-24 PROCEDURE — 81002 POCT URINE DIPSTICK WITHOUT MICROSCOPE: ICD-10-PCS | Mod: S$GLB,,, | Performed by: INTERNAL MEDICINE

## 2020-12-24 PROCEDURE — 99999 PR PBB SHADOW E&M-EST. PATIENT-LVL III: ICD-10-PCS | Mod: PBBFAC,,, | Performed by: INTERNAL MEDICINE

## 2020-12-24 PROCEDURE — 3075F SYST BP GE 130 - 139MM HG: CPT | Mod: CPTII,S$GLB,, | Performed by: INTERNAL MEDICINE

## 2020-12-24 PROCEDURE — 87491 CHLMYD TRACH DNA AMP PROBE: CPT

## 2020-12-24 PROCEDURE — 99499 RISK ADDL DX/OHS AUDIT: ICD-10-PCS | Mod: S$GLB,,, | Performed by: INTERNAL MEDICINE

## 2020-12-24 PROCEDURE — 81002 URINALYSIS NONAUTO W/O SCOPE: CPT | Mod: S$GLB,,, | Performed by: INTERNAL MEDICINE

## 2020-12-24 PROCEDURE — 3008F BODY MASS INDEX DOCD: CPT | Mod: CPTII,S$GLB,, | Performed by: INTERNAL MEDICINE

## 2020-12-24 PROCEDURE — 3008F PR BODY MASS INDEX (BMI) DOCUMENTED: ICD-10-PCS | Mod: CPTII,S$GLB,, | Performed by: INTERNAL MEDICINE

## 2020-12-24 PROCEDURE — 99214 OFFICE O/P EST MOD 30 MIN: CPT | Mod: 25,S$GLB,, | Performed by: INTERNAL MEDICINE

## 2020-12-24 PROCEDURE — 3079F PR MOST RECENT DIASTOLIC BLOOD PRESSURE 80-89 MM HG: ICD-10-PCS | Mod: CPTII,S$GLB,, | Performed by: INTERNAL MEDICINE

## 2020-12-24 RX ORDER — CEFTRIAXONE 250 MG/1
250 INJECTION, POWDER, FOR SOLUTION INTRAMUSCULAR; INTRAVENOUS
Status: DISCONTINUED | OUTPATIENT
Start: 2020-12-24 | End: 2022-01-30

## 2020-12-24 RX ORDER — AZITHROMYCIN 500 MG/1
1000 TABLET, FILM COATED ORAL ONCE
Qty: 2 TABLET | Refills: 0 | Status: SHIPPED | OUTPATIENT
Start: 2020-12-24 | End: 2020-12-24

## 2020-12-24 NOTE — PROGRESS NOTES
Subjective:       Patient ID: Gena Lui is a 65 y.o. female.    Chief Complaint: Vaginal Discharge      This is a 65-year-old female with anxiety and depression, hypertension, hyperlipidemia, and has been treated for squamous cell carcinoma of the face.  The patient reports that after not having sexual intercourse for a while with her boyfriend a couple of weeks ago they has excellent at that time she reports she has some pain and notice some spotting that she has been told is normal.  In the last few days has been noticing a whitish sticky discharge and due to her treatment for the squamous cell carcinoma she thought could be associated to [place].  She had a prescription of Monistat to use after she completes antibiotics and steroids but became concerned when before it was time to using Monistat she started having itching and white discharge was turning green.  She called her dermatologist who advised not to use the Monistat and be evaluated.  Reports some occasional dysuria but no fever or chills, urine looks normal.  Denies changes in bowel habits and some mild low abdominal pain that improves with bowel movement.  She mentioned concerns to her boyfriend of possible complications from her cancer treatment and the boyfriend voiced he knew people that got treatment for STDs without being tested. She took that remark as a suggestion that STD could be a possibility.  Now she is very anxious that she may have an STD and her boyfriend is not being honest.  She recalls he had occasional urinary discomfort.    Review of Systems   Constitutional: Positive for chills (She is usually cold and has been getting chills for a long time.). Negative for appetite change, fatigue and fever.   HENT: Positive for hearing loss. Negative for sore throat.    Eyes: Negative for visual disturbance.   Respiratory: Negative for cough and shortness of breath.    Cardiovascular: Negative for chest pain, palpitations and leg swelling.    Gastrointestinal: Positive for abdominal pain (Low abdominal discomfort). Negative for change in bowel habit (Sometimes needing fiber or stool soft), nausea, vomiting and change in bowel habit (Sometimes needing fiber or stool soft).   Genitourinary: Positive for dyspareunia, dysuria and vaginal discharge. Negative for bladder incontinence and difficulty urinating.   Neurological: Negative for dizziness, weakness, light-headedness, numbness and headaches.   Hematological: Does not bruise/bleed easily.   Psychiatric/Behavioral: Negative for sleep disturbance and suicidal ideas. The patient is nervous/anxious.       Past Medical History:   Diagnosis Date    Anxiety and depression 3/21/2018    Benign essential hypertension 3/21/2018    Chronic bilateral low back pain without sciatica 3/21/2018    History of stroke 3/21/2018    Memory loss     Mixed hyperlipidemia 3/21/2018    Seizures     Tobacco abuse 3/21/2018       Past Surgical History:   Procedure Laterality Date    ANTERIOR VAGINAL REPAIR      CATARACT EXTRACTION Right 10/16/2007    Dr. Ramsey //yag 5/1/18 Dr. Bentley    CATARACT EXTRACTION W/  INTRAOCULAR LENS IMPLANT Left 10/30/2007    Dr. Ramsey// yag 5/1/18 Dr. Bentley    CHOLECYSTECTOMY      COLONOSCOPY N/A 4/19/2018    Procedure: COLONOSCOPY;  Surgeon: Yi Goncalves MD;  Location: [facility];  Service: Endoscopy;  Laterality: N/A;    EYE SURGERY      TONSILLECTOMY      UMBILICAL HERNIA REPAIR N/A 2/8/2019    Procedure: REPAIR, HERNIA, UMBILICAL, AGE 5 YEARS OR OLDER, open with/without mesh;  Surgeon: Magno Mishra MD;  Location: [facility] OR [address] [place] [address];  Service: General;  Laterality: N/A;       Family History   Problem Relation Age of Onset    Blindness Neg Hx     Glaucoma Neg Hx     Macular degeneration Neg Hx     Retinal detachment Neg Hx        Social History     Socioeconomic History    Marital status:      Spouse name: Not on file    Number of children: Not on file     Years of education: Not on file    Highest education level: Not on file   Occupational History    Not on file   Social Needs    Financial resource strain: Not on file    Food insecurity     Worry: Not on file     Inability: Not on file    Transportation needs     Medical: Not on file     Non-medical: Not on file   Tobacco Use    Smoking status: Former Smoker     Types: Cigarettes     Quit date: 2019     Years since quittin.6    Smokeless tobacco: Never Used    Tobacco comment: 1 PPD   Substance and Sexual Activity    Alcohol use: Yes     Comment: 1-2 drinks daily    Drug use: Yes     Types: Marijuana     Comment: intermittent to take the edge off of her anxiety    Sexual activity: Never   Lifestyle    Physical activity     Days per week: Not on file     Minutes per session: Not on file    Stress: Not on file   Relationships    Social connections     Talks on phone: Not on file     Gets together: Not on file     Attends Orthodox service: Not on file     Active member of club or organization: Not on file     Attends meetings of clubs or organizations: Not on file     Relationship status: Not on file   Other Topics Concern    Not on file   Social History Narrative    Not on file       Current Outpatient Medications   Medication Sig Dispense Refill    ALPRAZolam (XANAX) 0.5 MG tablet TAKE 1 TABLET BY MOUTH EVERY DAY AS NEEDED FOR INSOMNIA OR ANXIETY (PANIC ATTACK) 30 tablet 5    aspirin 81 MG Chew Take 81 mg by mouth every evening.       atorvastatin (LIPITOR) 80 MG tablet Take 1 tablet (80 mg total) by mouth once daily. 90 tablet 3    ergocalciferol (ERGOCALCIFEROL) 50,000 unit Cap TAKE ONE CAPSULE BY MOUTH ONE TIME PER WEEK 12 capsule 6    ibuprofen (ADVIL,MOTRIN) 800 MG tablet       levETIRAcetam (KEPPRA) 500 MG Tab TAKE 1 TABLET BY MOUTH TWICE A  tablet 4    lisinopriL 10 MG tablet Take 1 tablet (10 mg total) by mouth once daily. 90 tablet 4    azithromycin (ZITHROMAX) 500 MG  "tablet Take 2 tablets (1,000 mg total) by mouth once. for 1 dose 2 tablet 0    fluticasone (FLONASE) 50 mcg/actuation nasal spray 2 sprays (100 mcg total) by Each Nare route once daily. (Patient not taking: Reported on 12/24/2020) 16 g 11    levocetirizine (XYZAL) 5 MG tablet Take 1 tablet (5 mg total) by mouth every evening. (Patient not taking: Reported on 12/24/2020) 30 tablet 11    omeprazole (PRILOSEC) 20 MG capsule Take 1 capsule (20 mg total) by mouth once daily. (Patient not taking: Reported on 12/24/2020) 90 capsule 3     Current Facility-Administered Medications   Medication Dose Route Frequency Provider Last Rate Last Dose    cefTRIAXone injection 250 mg  250 mg Intramuscular 1 time in Clinic/HOD Mica Soliman MD           Review of patient's allergies indicates:   Allergen Reactions    Versed [midazolam]      IV Versed altered mental status         Objective:       Last 3 sets of Vitals    Vitals - 1 value per visit 12/9/2019 6/16/2020 12/24/2020   SYSTOLIC 100 128 134   DIASTOLIC 74 76 82   PULSE 94 75 85   TEMPERATURE 97.8 - 98.5   RESPIRATIONS - - -   SPO2 97 98 98   Weight (lb) 113.98 114.2 102.51   Weight (kg) 51.7 51.8 46.5   HEIGHT 4' 11" 4' 11" 4' 11"   BODY MASS INDEX 23.02 23.07 20.71   VISIT REPORT - - -   Pain Score  0 0 -   Physical Exam  Constitutional:       Appearance: Normal appearance.      Comments: She is then and anxious   HENT:      Head: Normocephalic.      Nose: Nose normal.      Mouth/Throat:      Mouth: Mucous membranes are moist.   Eyes:      General: No scleral icterus.     Extraocular Movements: Extraocular movements intact.      Conjunctiva/sclera: Conjunctivae normal.      Pupils: Pupils are equal, round, and reactive to light.   Neck:      Musculoskeletal: Neck supple.      Vascular: No carotid bruit.   Cardiovascular:      Rate and Rhythm: Normal rate and regular rhythm.      Pulses: Normal pulses.      Heart sounds: Normal heart sounds.   Pulmonary:      Effort: " Pulmonary effort is normal.      Breath sounds: Normal breath sounds.   Abdominal:      General: Bowel sounds are normal. There is no distension.      Palpations: Abdomen is soft. There is no mass.      Tenderness: There is no abdominal tenderness.      Hernia: A hernia (Reducible abdominal/incisional hernia) is present.   Genitourinary:     General: Normal vulva.      Vagina: Vaginal discharge (Mild erythematous vaginal with thick yellowish to mildly greenish secretions.) present.   Musculoskeletal: Normal range of motion.         General: No swelling.   Lymphadenopathy:      Cervical: No cervical adenopathy.   Skin:     General: Skin is warm.   Neurological:      General: No focal deficit present.      Mental Status: She is alert and oriented to person, place, and time.   Psychiatric:      Comments: Very anxious and with crying spells          Assessment:       1. Vaginal discharge    2. Dysuria    3. Anxiety and depression    4. Squamous cell carcinoma, face    5. Encounter for hepatitis C screening test for low risk patient    6. Encounter for screening for human immunodeficiency virus (HIV)        Plan:       Gena was seen today for vaginal discharge.    Diagnoses and all orders for this visit:    Vaginal discharge  -     patient presenting vaginal discharge in history concerning of sexual transmitted disease.  Will test for STDs and treat empirically.  Advised to refrain of sexual intercourse while workup in progress and if boyfriend has symptoms to go get evaluated and treated.  -     CHLAMYDIA TRACHOMATIS RNA, TMA  -     Urine culture; Future  -     Urinalysis  -     CBC Auto Differential; Future  -     Comprehensive Metabolic Panel; Future  -     POCT Vaginosis Screen by DNA Probe (Affirm); Future  -     Vaginosis Screen by DNA Probe  -     HPV High Risk Genotypes, PCR    Dysuria  -     Urine culture; Future  -     Urinalysis- leuk present in dipstick, follow cultures.  Used is possible but not finding  changes to suggest candidiasis.  -     CBC Auto Differential; Future  -     Comprehensive Metabolic Panel; Future  -     POCT URINE DIPSTICK WITHOUT MICROSCOPE    Anxiety and depression.  Continue same treatment.  Reassured as she is getting treatments and will follow cultures/test to optimize medications.    Squamous cell carcinoma, face- follows with dermatology.    Encounter for hepatitis C screening test for low risk patient  -     Hepatitis C Antibody; Future    Encounter for screening for human immunodeficiency virus (HIV)  -     HIV 1/2 Ag/Ab (4th Gen); Future    Other orders  -     cefTRIAXone injection 250 mg  -     azithromycin (ZITHROMAX) 500 MG tablet; Take 2 tablets (1,000 mg total) by mouth once. for 1 dose      RTC in 2 weeks

## 2020-12-26 LAB
C TRACH DNA SPEC QL NAA+PROBE: NOT DETECTED
N GONORRHOEA DNA SPEC QL NAA+PROBE: NOT DETECTED

## 2020-12-28 ENCOUNTER — TELEPHONE (OUTPATIENT)
Dept: FAMILY MEDICINE | Facility: CLINIC | Age: 65
End: 2020-12-28

## 2020-12-28 NOTE — TELEPHONE ENCOUNTER
----- Message from Bijan Herrera sent at 12/28/2020  9:45 AM CST -----  Type:  Needs Medical Advice    Who Called: self  Reason:needs a follow up visit for around 1/7  Would the patient rather a call back or a response via [facility]? call  Best Call Back Number: [phone]  Additional Information: none

## 2020-12-30 ENCOUNTER — TELEPHONE (OUTPATIENT)
Dept: FAMILY MEDICINE | Facility: CLINIC | Age: 65
End: 2020-12-30

## 2020-12-30 RX ORDER — METRONIDAZOLE 7.5 MG/G
1 GEL VAGINAL DAILY
Qty: 70 G | Refills: 1 | Status: SHIPPED | OUTPATIENT
Start: 2020-12-30 | End: 2021-04-21 | Stop reason: ALTCHOICE

## 2020-12-31 NOTE — TELEPHONE ENCOUNTER
The patient was call with update on test results.  Test for chlamydia and gonorrhea were negative, culture was negative, but the test for bacterial vaginosis had to be canceled.  The patient reports she is feeling better but still having some discharge.  I will order Metrogel for 5 days and will further evaluate on her follow-up appointment.

## 2021-01-11 ENCOUNTER — TELEPHONE (OUTPATIENT)
Dept: FAMILY MEDICINE | Facility: CLINIC | Age: 66
End: 2021-01-11

## 2021-01-22 DIAGNOSIS — F32.A ANXIETY AND DEPRESSION: ICD-10-CM

## 2021-01-22 DIAGNOSIS — F41.9 ANXIETY AND DEPRESSION: ICD-10-CM

## 2021-01-23 RX ORDER — ALPRAZOLAM 0.5 MG/1
TABLET ORAL
Qty: 30 TABLET | Refills: 5 | Status: SHIPPED | OUTPATIENT
Start: 2021-01-23 | End: 2021-08-11

## 2021-02-22 ENCOUNTER — TELEPHONE (OUTPATIENT)
Dept: FAMILY MEDICINE | Facility: CLINIC | Age: 66
End: 2021-02-22

## 2021-02-22 ENCOUNTER — LAB VISIT (OUTPATIENT)
Dept: LAB | Facility: HOSPITAL | Age: 66
End: 2021-02-22
Attending: FAMILY MEDICINE
Payer: MEDICARE

## 2021-02-22 ENCOUNTER — OFFICE VISIT (OUTPATIENT)
Dept: FAMILY MEDICINE | Facility: CLINIC | Age: 66
End: 2021-02-22
Payer: MEDICARE

## 2021-02-22 VITALS
HEART RATE: 87 BPM | WEIGHT: 97.25 LBS | DIASTOLIC BLOOD PRESSURE: 64 MMHG | HEIGHT: 59 IN | SYSTOLIC BLOOD PRESSURE: 112 MMHG | OXYGEN SATURATION: 98 % | BODY MASS INDEX: 19.6 KG/M2

## 2021-02-22 DIAGNOSIS — I10 BENIGN ESSENTIAL HYPERTENSION: ICD-10-CM

## 2021-02-22 DIAGNOSIS — E78.2 MIXED HYPERLIPIDEMIA: ICD-10-CM

## 2021-02-22 DIAGNOSIS — F32.A ANXIETY AND DEPRESSION: ICD-10-CM

## 2021-02-22 DIAGNOSIS — F41.9 ANXIETY AND DEPRESSION: ICD-10-CM

## 2021-02-22 DIAGNOSIS — Z79.82 LONG-TERM USE OF ASPIRIN THERAPY: ICD-10-CM

## 2021-02-22 DIAGNOSIS — F33.0 MAJOR DEPRESSIVE DISORDER, RECURRENT, MILD: ICD-10-CM

## 2021-02-22 DIAGNOSIS — Z79.899 MEDICATION MANAGEMENT: ICD-10-CM

## 2021-02-22 DIAGNOSIS — Z87.898 HISTORY OF SEIZURES: ICD-10-CM

## 2021-02-22 DIAGNOSIS — E55.9 VITAMIN D DEFICIENCY: ICD-10-CM

## 2021-02-22 DIAGNOSIS — M81.0 POSTMENOPAUSAL OSTEOPOROSIS: ICD-10-CM

## 2021-02-22 DIAGNOSIS — Z01.419 ENCOUNTER FOR ROUTINE GYNECOLOGICAL EXAMINATION WITH PAPANICOLAOU SMEAR OF CERVIX: ICD-10-CM

## 2021-02-22 DIAGNOSIS — D04.9 SQUAMOUS CELL CARCINOMA IN SITU (SCCIS) OF SKIN: ICD-10-CM

## 2021-02-22 DIAGNOSIS — E44.1 PROTEIN-CALORIE MALNUTRITION, MILD: ICD-10-CM

## 2021-02-22 DIAGNOSIS — Z23 HIGH PRIORITY FOR COVID-19 VIRUS VACCINATION: ICD-10-CM

## 2021-02-22 DIAGNOSIS — R79.89 ABNORMAL TSH: ICD-10-CM

## 2021-02-22 DIAGNOSIS — F10.11 HISTORY OF ALCOHOL ABUSE: ICD-10-CM

## 2021-02-22 DIAGNOSIS — Z87.891 FORMER SMOKER: ICD-10-CM

## 2021-02-22 DIAGNOSIS — Z00.00 ROUTINE GENERAL MEDICAL EXAMINATION AT A HEALTH CARE FACILITY: Primary | ICD-10-CM

## 2021-02-22 DIAGNOSIS — K42.9 UMBILICAL HERNIA WITHOUT OBSTRUCTION AND WITHOUT GANGRENE: ICD-10-CM

## 2021-02-22 DIAGNOSIS — Z86.73 HISTORY OF STROKE: ICD-10-CM

## 2021-02-22 PROBLEM — R87.810 PAPANICOLAOU SMEAR OF CERVIX WITH POSITIVE HIGH RISK HUMAN PAPILLOMA VIRUS (HPV) TEST: Status: RESOLVED | Noted: 2019-04-16 | Resolved: 2021-02-22

## 2021-02-22 LAB
ALBUMIN SERPL BCP-MCNC: 4.9 G/DL (ref 3.5–5.2)
ALP SERPL-CCNC: 100 U/L (ref 55–135)
ALT SERPL W/O P-5'-P-CCNC: 26 U/L (ref 10–44)
ANION GAP SERPL CALC-SCNC: 11 MMOL/L (ref 8–16)
AST SERPL-CCNC: 29 U/L (ref 10–40)
BASOPHILS # BLD AUTO: 0.06 K/UL (ref 0–0.2)
BASOPHILS NFR BLD: 0.6 % (ref 0–1.9)
BILIRUB SERPL-MCNC: 0.8 MG/DL (ref 0.1–1)
BUN SERPL-MCNC: 13 MG/DL (ref 8–23)
CALCIUM SERPL-MCNC: 10.3 MG/DL (ref 8.7–10.5)
CHLORIDE SERPL-SCNC: 104 MMOL/L (ref 95–110)
CHOLEST SERPL-MCNC: 134 MG/DL (ref 120–199)
CHOLEST/HDLC SERPL: 2.5 {RATIO} (ref 2–5)
CO2 SERPL-SCNC: 27 MMOL/L (ref 23–29)
CREAT SERPL-MCNC: 0.8 MG/DL (ref 0.5–1.4)
DIFFERENTIAL METHOD: ABNORMAL
EOSINOPHIL # BLD AUTO: 0.1 K/UL (ref 0–0.5)
EOSINOPHIL NFR BLD: 0.5 % (ref 0–8)
ERYTHROCYTE [DISTWIDTH] IN BLOOD BY AUTOMATED COUNT: 13.4 % (ref 11.5–14.5)
EST. GFR  (AFRICAN AMERICAN): >60 ML/MIN/1.73 M^2
EST. GFR  (NON AFRICAN AMERICAN): >60 ML/MIN/1.73 M^2
GLUCOSE SERPL-MCNC: 97 MG/DL (ref 70–110)
HCT VFR BLD AUTO: 43.5 % (ref 37–48.5)
HDLC SERPL-MCNC: 53 MG/DL (ref 40–75)
HDLC SERPL: 39.6 % (ref 20–50)
HGB BLD-MCNC: 14.4 G/DL (ref 12–16)
IMM GRANULOCYTES # BLD AUTO: 0.04 K/UL (ref 0–0.04)
IMM GRANULOCYTES NFR BLD AUTO: 0.4 % (ref 0–0.5)
LDLC SERPL CALC-MCNC: 58.4 MG/DL (ref 63–159)
LYMPHOCYTES # BLD AUTO: 2 K/UL (ref 1–4.8)
LYMPHOCYTES NFR BLD: 19.1 % (ref 18–48)
MCH RBC QN AUTO: 31.4 PG (ref 27–31)
MCHC RBC AUTO-ENTMCNC: 33.1 G/DL (ref 32–36)
MCV RBC AUTO: 95 FL (ref 82–98)
MONOCYTES # BLD AUTO: 0.7 K/UL (ref 0.3–1)
MONOCYTES NFR BLD: 6.2 % (ref 4–15)
NEUTROPHILS # BLD AUTO: 7.7 K/UL (ref 1.8–7.7)
NEUTROPHILS NFR BLD: 73.2 % (ref 38–73)
NONHDLC SERPL-MCNC: 81 MG/DL
NRBC BLD-RTO: 0 /100 WBC
PLATELET # BLD AUTO: 255 K/UL (ref 150–350)
PMV BLD AUTO: 10.8 FL (ref 9.2–12.9)
POTASSIUM SERPL-SCNC: 3.7 MMOL/L (ref 3.5–5.1)
PREALB SERPL-MCNC: 28 MG/DL (ref 20–43)
PROT SERPL-MCNC: 8.1 G/DL (ref 6–8.4)
RBC # BLD AUTO: 4.58 M/UL (ref 4–5.4)
SODIUM SERPL-SCNC: 142 MMOL/L (ref 136–145)
TRIGL SERPL-MCNC: 113 MG/DL (ref 30–150)
TSH SERPL DL<=0.005 MIU/L-ACNC: 2.05 UIU/ML (ref 0.4–4)
WBC # BLD AUTO: 10.5 K/UL (ref 3.9–12.7)

## 2021-02-22 PROCEDURE — 3074F PR MOST RECENT SYSTOLIC BLOOD PRESSURE < 130 MM HG: ICD-10-PCS | Mod: CPTII,S$GLB,, | Performed by: FAMILY MEDICINE

## 2021-02-22 PROCEDURE — 82306 VITAMIN D 25 HYDROXY: CPT

## 2021-02-22 PROCEDURE — 3008F BODY MASS INDEX DOCD: CPT | Mod: CPTII,S$GLB,, | Performed by: FAMILY MEDICINE

## 2021-02-22 PROCEDURE — 1125F AMNT PAIN NOTED PAIN PRSNT: CPT | Mod: S$GLB,,, | Performed by: FAMILY MEDICINE

## 2021-02-22 PROCEDURE — 99999 PR PBB SHADOW E&M-EST. PATIENT-LVL IV: CPT | Mod: PBBFAC,,, | Performed by: FAMILY MEDICINE

## 2021-02-22 PROCEDURE — 84443 ASSAY THYROID STIM HORMONE: CPT

## 2021-02-22 PROCEDURE — 84134 ASSAY OF PREALBUMIN: CPT

## 2021-02-22 PROCEDURE — 80053 COMPREHEN METABOLIC PANEL: CPT

## 2021-02-22 PROCEDURE — 3008F PR BODY MASS INDEX (BMI) DOCUMENTED: ICD-10-PCS | Mod: CPTII,S$GLB,, | Performed by: FAMILY MEDICINE

## 2021-02-22 PROCEDURE — 99214 OFFICE O/P EST MOD 30 MIN: CPT | Mod: S$GLB,,, | Performed by: FAMILY MEDICINE

## 2021-02-22 PROCEDURE — 99999 PR PBB SHADOW E&M-EST. PATIENT-LVL IV: ICD-10-PCS | Mod: PBBFAC,,, | Performed by: FAMILY MEDICINE

## 2021-02-22 PROCEDURE — 36415 COLL VENOUS BLD VENIPUNCTURE: CPT

## 2021-02-22 PROCEDURE — 80061 LIPID PANEL: CPT

## 2021-02-22 PROCEDURE — 3074F SYST BP LT 130 MM HG: CPT | Mod: CPTII,S$GLB,, | Performed by: FAMILY MEDICINE

## 2021-02-22 PROCEDURE — 99499 UNLISTED E&M SERVICE: CPT | Mod: S$GLB,,, | Performed by: FAMILY MEDICINE

## 2021-02-22 PROCEDURE — 85025 COMPLETE CBC W/AUTO DIFF WBC: CPT

## 2021-02-22 PROCEDURE — 1125F PR PAIN SEVERITY QUANTIFIED, PAIN PRESENT: ICD-10-PCS | Mod: S$GLB,,, | Performed by: FAMILY MEDICINE

## 2021-02-22 PROCEDURE — 99214 PR OFFICE/OUTPT VISIT, EST, LEVL IV, 30-39 MIN: ICD-10-PCS | Mod: S$GLB,,, | Performed by: FAMILY MEDICINE

## 2021-02-22 PROCEDURE — 3078F PR MOST RECENT DIASTOLIC BLOOD PRESSURE < 80 MM HG: ICD-10-PCS | Mod: CPTII,S$GLB,, | Performed by: FAMILY MEDICINE

## 2021-02-22 PROCEDURE — 99499 RISK ADDL DX/OHS AUDIT: ICD-10-PCS | Mod: S$GLB,,, | Performed by: FAMILY MEDICINE

## 2021-02-22 PROCEDURE — 3078F DIAST BP <80 MM HG: CPT | Mod: CPTII,S$GLB,, | Performed by: FAMILY MEDICINE

## 2021-02-22 RX ORDER — BUPROPION HYDROCHLORIDE 150 MG/1
150 TABLET ORAL DAILY
Qty: 90 TABLET | Refills: 4 | Status: SHIPPED | OUTPATIENT
Start: 2021-02-22 | End: 2021-04-21

## 2021-02-22 RX ORDER — FLUOXETINE HYDROCHLORIDE 20 MG/1
20 CAPSULE ORAL DAILY
Qty: 90 CAPSULE | Refills: 4 | Status: SHIPPED | OUTPATIENT
Start: 2021-02-22 | End: 2021-04-21 | Stop reason: ALTCHOICE

## 2021-02-23 LAB — 25(OH)D3+25(OH)D2 SERPL-MCNC: 77 NG/ML (ref 30–96)

## 2021-02-25 ENCOUNTER — OFFICE VISIT (OUTPATIENT)
Dept: SURGERY | Facility: CLINIC | Age: 66
End: 2021-02-25
Payer: MEDICARE

## 2021-02-25 ENCOUNTER — TELEPHONE (OUTPATIENT)
Dept: FAMILY MEDICINE | Facility: CLINIC | Age: 66
End: 2021-02-25

## 2021-02-25 VITALS
BODY MASS INDEX: 19.88 KG/M2 | SYSTOLIC BLOOD PRESSURE: 106 MMHG | DIASTOLIC BLOOD PRESSURE: 69 MMHG | WEIGHT: 98.63 LBS | HEIGHT: 59 IN | HEART RATE: 83 BPM

## 2021-02-25 DIAGNOSIS — K42.9 RECURRENT UMBILICAL HERNIA: Primary | ICD-10-CM

## 2021-02-25 DIAGNOSIS — Z01.818 PRE-OP TESTING: ICD-10-CM

## 2021-02-25 PROCEDURE — 99213 PR OFFICE/OUTPT VISIT, EST, LEVL III, 20-29 MIN: ICD-10-PCS | Mod: S$GLB,,, | Performed by: SURGERY

## 2021-02-25 PROCEDURE — 99213 OFFICE O/P EST LOW 20 MIN: CPT | Mod: S$GLB,,, | Performed by: SURGERY

## 2021-02-25 PROCEDURE — 3074F PR MOST RECENT SYSTOLIC BLOOD PRESSURE < 130 MM HG: ICD-10-PCS | Mod: CPTII,S$GLB,, | Performed by: SURGERY

## 2021-02-25 PROCEDURE — 99999 PR PBB SHADOW E&M-EST. PATIENT-LVL V: ICD-10-PCS | Mod: PBBFAC,,, | Performed by: SURGERY

## 2021-02-25 PROCEDURE — 3008F BODY MASS INDEX DOCD: CPT | Mod: CPTII,S$GLB,, | Performed by: SURGERY

## 2021-02-25 PROCEDURE — 99999 PR PBB SHADOW E&M-EST. PATIENT-LVL V: CPT | Mod: PBBFAC,,, | Performed by: SURGERY

## 2021-02-25 PROCEDURE — 3078F DIAST BP <80 MM HG: CPT | Mod: CPTII,S$GLB,, | Performed by: SURGERY

## 2021-02-25 PROCEDURE — 3074F SYST BP LT 130 MM HG: CPT | Mod: CPTII,S$GLB,, | Performed by: SURGERY

## 2021-02-25 PROCEDURE — 1126F PR PAIN SEVERITY QUANTIFIED, NO PAIN PRESENT: ICD-10-PCS | Mod: S$GLB,,, | Performed by: SURGERY

## 2021-02-25 PROCEDURE — 3008F PR BODY MASS INDEX (BMI) DOCUMENTED: ICD-10-PCS | Mod: CPTII,S$GLB,, | Performed by: SURGERY

## 2021-02-25 PROCEDURE — 3078F PR MOST RECENT DIASTOLIC BLOOD PRESSURE < 80 MM HG: ICD-10-PCS | Mod: CPTII,S$GLB,, | Performed by: SURGERY

## 2021-02-25 PROCEDURE — 1126F AMNT PAIN NOTED NONE PRSNT: CPT | Mod: S$GLB,,, | Performed by: SURGERY

## 2021-02-25 RX ORDER — SODIUM CHLORIDE 9 MG/ML
INJECTION, SOLUTION INTRAVENOUS CONTINUOUS
Status: CANCELLED | OUTPATIENT
Start: 2021-02-25

## 2021-03-01 ENCOUNTER — PATIENT OUTREACH (OUTPATIENT)
Dept: ADMINISTRATIVE | Facility: HOSPITAL | Age: 66
End: 2021-03-01

## 2021-03-05 ENCOUNTER — TELEPHONE (OUTPATIENT)
Dept: SURGERY | Facility: CLINIC | Age: 66
End: 2021-03-05

## 2021-04-21 ENCOUNTER — OFFICE VISIT (OUTPATIENT)
Dept: FAMILY MEDICINE | Facility: CLINIC | Age: 66
End: 2021-04-21
Payer: MEDICARE

## 2021-04-21 VITALS
OXYGEN SATURATION: 96 % | BODY MASS INDEX: 19.47 KG/M2 | HEART RATE: 98 BPM | SYSTOLIC BLOOD PRESSURE: 112 MMHG | DIASTOLIC BLOOD PRESSURE: 70 MMHG | HEIGHT: 59 IN | WEIGHT: 96.56 LBS

## 2021-04-21 DIAGNOSIS — F41.9 ANXIETY AND DEPRESSION: ICD-10-CM

## 2021-04-21 DIAGNOSIS — I10 BENIGN ESSENTIAL HYPERTENSION: Primary | ICD-10-CM

## 2021-04-21 DIAGNOSIS — Z98.890 H/O UMBILICAL HERNIA REPAIR: ICD-10-CM

## 2021-04-21 DIAGNOSIS — Z87.891 FORMER SMOKER: ICD-10-CM

## 2021-04-21 DIAGNOSIS — F32.A ANXIETY AND DEPRESSION: ICD-10-CM

## 2021-04-21 DIAGNOSIS — Z87.19 H/O UMBILICAL HERNIA REPAIR: ICD-10-CM

## 2021-04-21 PROCEDURE — 99999 PR PBB SHADOW E&M-EST. PATIENT-LVL III: ICD-10-PCS | Mod: PBBFAC,,, | Performed by: FAMILY MEDICINE

## 2021-04-21 PROCEDURE — 1125F AMNT PAIN NOTED PAIN PRSNT: CPT | Mod: S$GLB,,, | Performed by: FAMILY MEDICINE

## 2021-04-21 PROCEDURE — 99214 OFFICE O/P EST MOD 30 MIN: CPT | Mod: S$GLB,,, | Performed by: FAMILY MEDICINE

## 2021-04-21 PROCEDURE — 3008F PR BODY MASS INDEX (BMI) DOCUMENTED: ICD-10-PCS | Mod: CPTII,S$GLB,, | Performed by: FAMILY MEDICINE

## 2021-04-21 PROCEDURE — 99214 PR OFFICE/OUTPT VISIT, EST, LEVL IV, 30-39 MIN: ICD-10-PCS | Mod: S$GLB,,, | Performed by: FAMILY MEDICINE

## 2021-04-21 PROCEDURE — 99999 PR PBB SHADOW E&M-EST. PATIENT-LVL III: CPT | Mod: PBBFAC,,, | Performed by: FAMILY MEDICINE

## 2021-04-21 PROCEDURE — 99499 RISK ADDL DX/OHS AUDIT: ICD-10-PCS | Mod: S$GLB,,, | Performed by: FAMILY MEDICINE

## 2021-04-21 PROCEDURE — 3008F BODY MASS INDEX DOCD: CPT | Mod: CPTII,S$GLB,, | Performed by: FAMILY MEDICINE

## 2021-04-21 PROCEDURE — 1125F PR PAIN SEVERITY QUANTIFIED, PAIN PRESENT: ICD-10-PCS | Mod: S$GLB,,, | Performed by: FAMILY MEDICINE

## 2021-04-21 PROCEDURE — 99499 UNLISTED E&M SERVICE: CPT | Mod: S$GLB,,, | Performed by: FAMILY MEDICINE

## 2021-04-21 RX ORDER — FLUOXETINE 10 MG/1
10 CAPSULE ORAL NIGHTLY
Qty: 90 CAPSULE | Refills: 3 | Status: SHIPPED | OUTPATIENT
Start: 2021-04-21 | End: 2021-06-16

## 2021-06-16 ENCOUNTER — OFFICE VISIT (OUTPATIENT)
Dept: FAMILY MEDICINE | Facility: CLINIC | Age: 66
End: 2021-06-16
Payer: MEDICARE

## 2021-06-16 ENCOUNTER — TELEPHONE (OUTPATIENT)
Dept: FAMILY MEDICINE | Facility: CLINIC | Age: 66
End: 2021-06-16

## 2021-06-16 VITALS
HEIGHT: 59 IN | OXYGEN SATURATION: 98 % | DIASTOLIC BLOOD PRESSURE: 82 MMHG | WEIGHT: 95.88 LBS | BODY MASS INDEX: 19.33 KG/M2 | HEART RATE: 72 BPM | SYSTOLIC BLOOD PRESSURE: 116 MMHG

## 2021-06-16 DIAGNOSIS — Z79.899 MEDICATION MANAGEMENT: ICD-10-CM

## 2021-06-16 DIAGNOSIS — Z12.31 ENCOUNTER FOR SCREENING MAMMOGRAM FOR BREAST CANCER: ICD-10-CM

## 2021-06-16 DIAGNOSIS — K43.2 INCISIONAL HERNIA, WITHOUT OBSTRUCTION OR GANGRENE: ICD-10-CM

## 2021-06-16 DIAGNOSIS — I10 BENIGN ESSENTIAL HYPERTENSION: Primary | ICD-10-CM

## 2021-06-16 DIAGNOSIS — F10.11 HISTORY OF ALCOHOL ABUSE: ICD-10-CM

## 2021-06-16 DIAGNOSIS — Z79.82 LONG-TERM USE OF ASPIRIN THERAPY: ICD-10-CM

## 2021-06-16 DIAGNOSIS — E78.2 MIXED HYPERLIPIDEMIA: ICD-10-CM

## 2021-06-16 DIAGNOSIS — F32.A ANXIETY AND DEPRESSION: ICD-10-CM

## 2021-06-16 DIAGNOSIS — Z86.73 HISTORY OF STROKE: ICD-10-CM

## 2021-06-16 DIAGNOSIS — F41.9 ANXIETY AND DEPRESSION: ICD-10-CM

## 2021-06-16 DIAGNOSIS — E44.1 PROTEIN-CALORIE MALNUTRITION, MILD: ICD-10-CM

## 2021-06-16 PROCEDURE — 3079F PR MOST RECENT DIASTOLIC BLOOD PRESSURE 80-89 MM HG: ICD-10-PCS | Mod: CPTII,S$GLB,, | Performed by: FAMILY MEDICINE

## 2021-06-16 PROCEDURE — 3074F PR MOST RECENT SYSTOLIC BLOOD PRESSURE < 130 MM HG: ICD-10-PCS | Mod: CPTII,S$GLB,, | Performed by: FAMILY MEDICINE

## 2021-06-16 PROCEDURE — 1126F AMNT PAIN NOTED NONE PRSNT: CPT | Mod: S$GLB,,, | Performed by: FAMILY MEDICINE

## 2021-06-16 PROCEDURE — 3008F BODY MASS INDEX DOCD: CPT | Mod: CPTII,S$GLB,, | Performed by: FAMILY MEDICINE

## 2021-06-16 PROCEDURE — 99999 PR PBB SHADOW E&M-EST. PATIENT-LVL IV: ICD-10-PCS | Mod: PBBFAC,,, | Performed by: FAMILY MEDICINE

## 2021-06-16 PROCEDURE — 99214 PR OFFICE/OUTPT VISIT, EST, LEVL IV, 30-39 MIN: ICD-10-PCS | Mod: S$GLB,,, | Performed by: FAMILY MEDICINE

## 2021-06-16 PROCEDURE — 3079F DIAST BP 80-89 MM HG: CPT | Mod: CPTII,S$GLB,, | Performed by: FAMILY MEDICINE

## 2021-06-16 PROCEDURE — 99214 OFFICE O/P EST MOD 30 MIN: CPT | Mod: S$GLB,,, | Performed by: FAMILY MEDICINE

## 2021-06-16 PROCEDURE — 99999 PR PBB SHADOW E&M-EST. PATIENT-LVL IV: CPT | Mod: PBBFAC,,, | Performed by: FAMILY MEDICINE

## 2021-06-16 PROCEDURE — 3074F SYST BP LT 130 MM HG: CPT | Mod: CPTII,S$GLB,, | Performed by: FAMILY MEDICINE

## 2021-06-16 PROCEDURE — 3008F PR BODY MASS INDEX (BMI) DOCUMENTED: ICD-10-PCS | Mod: CPTII,S$GLB,, | Performed by: FAMILY MEDICINE

## 2021-06-16 PROCEDURE — 1126F PR PAIN SEVERITY QUANTIFIED, NO PAIN PRESENT: ICD-10-PCS | Mod: S$GLB,,, | Performed by: FAMILY MEDICINE

## 2021-06-18 ENCOUNTER — OFFICE VISIT (OUTPATIENT)
Dept: SURGERY | Facility: CLINIC | Age: 66
End: 2021-06-18
Payer: MEDICARE

## 2021-06-18 VITALS
HEART RATE: 82 BPM | SYSTOLIC BLOOD PRESSURE: 113 MMHG | HEIGHT: 59 IN | DIASTOLIC BLOOD PRESSURE: 73 MMHG | WEIGHT: 95.25 LBS | BODY MASS INDEX: 19.2 KG/M2

## 2021-06-18 DIAGNOSIS — K43.2 INCISIONAL HERNIA, WITHOUT OBSTRUCTION OR GANGRENE: Primary | ICD-10-CM

## 2021-06-18 PROCEDURE — 3008F BODY MASS INDEX DOCD: CPT | Mod: CPTII,S$GLB,, | Performed by: SURGERY

## 2021-06-18 PROCEDURE — 1126F AMNT PAIN NOTED NONE PRSNT: CPT | Mod: S$GLB,,, | Performed by: SURGERY

## 2021-06-18 PROCEDURE — 3008F PR BODY MASS INDEX (BMI) DOCUMENTED: ICD-10-PCS | Mod: CPTII,S$GLB,, | Performed by: SURGERY

## 2021-06-18 PROCEDURE — 1126F PR PAIN SEVERITY QUANTIFIED, NO PAIN PRESENT: ICD-10-PCS | Mod: S$GLB,,, | Performed by: SURGERY

## 2021-06-18 PROCEDURE — 99999 PR PBB SHADOW E&M-EST. PATIENT-LVL V: ICD-10-PCS | Mod: PBBFAC,,, | Performed by: SURGERY

## 2021-06-18 PROCEDURE — 99999 PR PBB SHADOW E&M-EST. PATIENT-LVL V: CPT | Mod: PBBFAC,,, | Performed by: SURGERY

## 2021-06-18 PROCEDURE — 99213 PR OFFICE/OUTPT VISIT, EST, LEVL III, 20-29 MIN: ICD-10-PCS | Mod: S$GLB,,, | Performed by: SURGERY

## 2021-06-18 PROCEDURE — 99213 OFFICE O/P EST LOW 20 MIN: CPT | Mod: S$GLB,,, | Performed by: SURGERY

## 2021-06-18 RX ORDER — SODIUM CHLORIDE 9 MG/ML
INJECTION, SOLUTION INTRAVENOUS CONTINUOUS
Status: CANCELLED | OUTPATIENT
Start: 2021-06-18

## 2021-06-22 ENCOUNTER — ANESTHESIA EVENT (OUTPATIENT)
Dept: SURGERY | Facility: HOSPITAL | Age: 66
End: 2021-06-22
Payer: MEDICARE

## 2021-06-24 ENCOUNTER — HOSPITAL ENCOUNTER (OUTPATIENT)
Dept: RADIOLOGY | Facility: HOSPITAL | Age: 66
Discharge: HOME | End: 2021-06-24
Attending: FAMILY MEDICINE
Payer: MEDICARE

## 2021-06-24 DIAGNOSIS — Z12.31 ENCOUNTER FOR SCREENING MAMMOGRAM FOR BREAST CANCER: ICD-10-CM

## 2021-06-24 PROCEDURE — 77067 MAMMO DIGITAL SCREENING BILAT WITH TOMO: ICD-10-PCS | Mod: 26,,, | Performed by: RADIOLOGY

## 2021-06-24 PROCEDURE — 77067 SCR MAMMO BI INCL CAD: CPT | Mod: TC

## 2021-06-24 PROCEDURE — 77067 SCR MAMMO BI INCL CAD: CPT | Mod: 26,,, | Performed by: RADIOLOGY

## 2021-06-24 PROCEDURE — 77063 BREAST TOMOSYNTHESIS BI: CPT | Mod: 26,,, | Performed by: RADIOLOGY

## 2021-06-24 PROCEDURE — 77063 MAMMO DIGITAL SCREENING BILAT WITH TOMO: ICD-10-PCS | Mod: 26,,, | Performed by: RADIOLOGY

## 2021-06-28 ENCOUNTER — ANESTHESIA (OUTPATIENT)
Dept: SURGERY | Facility: HOSPITAL | Age: 66
End: 2021-06-28
Payer: MEDICARE

## 2021-06-28 ENCOUNTER — HOSPITAL ENCOUNTER (OUTPATIENT)
Facility: HOSPITAL | Age: 66
Discharge: HOME | End: 2021-06-28
Attending: SURGERY | Admitting: SURGERY
Payer: MEDICARE

## 2021-06-28 VITALS
OXYGEN SATURATION: 97 % | HEART RATE: 74 BPM | WEIGHT: 94 LBS | BODY MASS INDEX: 18.95 KG/M2 | SYSTOLIC BLOOD PRESSURE: 125 MMHG | TEMPERATURE: 98 F | RESPIRATION RATE: 16 BRPM | DIASTOLIC BLOOD PRESSURE: 68 MMHG | HEIGHT: 59 IN

## 2021-06-28 DIAGNOSIS — Z01.818 PREOP TESTING: ICD-10-CM

## 2021-06-28 DIAGNOSIS — K43.2 INCISIONAL HERNIA, WITHOUT OBSTRUCTION OR GANGRENE: Primary | ICD-10-CM

## 2021-06-28 PROCEDURE — 93005 ELECTROCARDIOGRAM TRACING: CPT | Mod: 59

## 2021-06-28 PROCEDURE — 49656 PR LAP, RECURRENT INCISIONAL HERNIA REPAIR,REDUCIBLE: ICD-10-PCS | Mod: ,,, | Performed by: SURGERY

## 2021-06-28 PROCEDURE — 63600175 PHARM REV CODE 636 W HCPCS

## 2021-06-28 PROCEDURE — 37000009 HC ANESTHESIA EA ADD 15 MINS: Performed by: SURGERY

## 2021-06-28 PROCEDURE — 63600175 PHARM REV CODE 636 W HCPCS: Performed by: SURGERY

## 2021-06-28 PROCEDURE — 71000033 HC RECOVERY, INTIAL HOUR: Performed by: SURGERY

## 2021-06-28 PROCEDURE — 63600175 PHARM REV CODE 636 W HCPCS: Performed by: ANESTHESIOLOGY

## 2021-06-28 PROCEDURE — 93010 ELECTROCARDIOGRAM REPORT: CPT | Mod: ,,, | Performed by: INTERNAL MEDICINE

## 2021-06-28 PROCEDURE — 93010 EKG 12-LEAD: ICD-10-PCS | Mod: ,,, | Performed by: INTERNAL MEDICINE

## 2021-06-28 PROCEDURE — 37000008 HC ANESTHESIA 1ST 15 MINUTES: Performed by: SURGERY

## 2021-06-28 PROCEDURE — 25000003 PHARM REV CODE 250: Performed by: NURSE ANESTHETIST, CERTIFIED REGISTERED

## 2021-06-28 PROCEDURE — 36000710: Performed by: SURGERY

## 2021-06-28 PROCEDURE — 36000711: Performed by: SURGERY

## 2021-06-28 PROCEDURE — 63600175 PHARM REV CODE 636 W HCPCS: Performed by: NURSE ANESTHETIST, CERTIFIED REGISTERED

## 2021-06-28 PROCEDURE — C1781 MESH (IMPLANTABLE): HCPCS | Performed by: SURGERY

## 2021-06-28 PROCEDURE — 25000003 PHARM REV CODE 250: Performed by: SURGERY

## 2021-06-28 PROCEDURE — 49656 PR LAP, RECURRENT INCISIONAL HERNIA REPAIR,REDUCIBLE: CPT | Mod: ,,, | Performed by: SURGERY

## 2021-06-28 PROCEDURE — 71000015 HC POSTOP RECOV 1ST HR: Performed by: SURGERY

## 2021-06-28 DEVICE — MESH SYMBOTEX HERN RND 9CM: Type: IMPLANTABLE DEVICE | Site: ABDOMEN | Status: FUNCTIONAL

## 2021-06-28 RX ORDER — ONDANSETRON 2 MG/ML
INJECTION INTRAMUSCULAR; INTRAVENOUS
Status: DISCONTINUED | OUTPATIENT
Start: 2021-06-28 | End: 2021-06-28

## 2021-06-28 RX ORDER — SODIUM CHLORIDE 0.9 % (FLUSH) 0.9 %
10 SYRINGE (ML) INJECTION
Status: DISCONTINUED | OUTPATIENT
Start: 2021-06-28 | End: 2021-06-28 | Stop reason: HOSPADM

## 2021-06-28 RX ORDER — CEFAZOLIN SODIUM 2 G/50ML
2 SOLUTION INTRAVENOUS
Status: COMPLETED | OUTPATIENT
Start: 2021-06-28 | End: 2021-06-28

## 2021-06-28 RX ORDER — LIDOCAINE HYDROCHLORIDE 20 MG/ML
INJECTION INTRAVENOUS
Status: DISCONTINUED | OUTPATIENT
Start: 2021-06-28 | End: 2021-06-28

## 2021-06-28 RX ORDER — HYDROMORPHONE HYDROCHLORIDE 2 MG/ML
0.5 INJECTION, SOLUTION INTRAMUSCULAR; INTRAVENOUS; SUBCUTANEOUS EVERY 5 MIN PRN
Status: DISCONTINUED | OUTPATIENT
Start: 2021-06-28 | End: 2021-06-28 | Stop reason: HOSPADM

## 2021-06-28 RX ORDER — DEXAMETHASONE SODIUM PHOSPHATE 4 MG/ML
INJECTION, SOLUTION INTRA-ARTICULAR; INTRALESIONAL; INTRAMUSCULAR; INTRAVENOUS; SOFT TISSUE
Status: DISCONTINUED | OUTPATIENT
Start: 2021-06-28 | End: 2021-06-28

## 2021-06-28 RX ORDER — ROCURONIUM BROMIDE 10 MG/ML
INJECTION, SOLUTION INTRAVENOUS
Status: DISCONTINUED | OUTPATIENT
Start: 2021-06-28 | End: 2021-06-28

## 2021-06-28 RX ORDER — OXYCODONE HYDROCHLORIDE 5 MG/1
5 TABLET ORAL ONCE
Status: COMPLETED | OUTPATIENT
Start: 2021-06-28 | End: 2021-06-28

## 2021-06-28 RX ORDER — FENTANYL CITRATE 50 UG/ML
INJECTION, SOLUTION INTRAMUSCULAR; INTRAVENOUS
Status: DISCONTINUED | OUTPATIENT
Start: 2021-06-28 | End: 2021-06-28

## 2021-06-28 RX ORDER — EPHEDRINE SULFATE 50 MG/ML
INJECTION, SOLUTION INTRAVENOUS
Status: DISCONTINUED | OUTPATIENT
Start: 2021-06-28 | End: 2021-06-28

## 2021-06-28 RX ORDER — BUPIVACAINE HYDROCHLORIDE 5 MG/ML
INJECTION, SOLUTION PERINEURAL
Status: DISCONTINUED | OUTPATIENT
Start: 2021-06-28 | End: 2021-06-28 | Stop reason: HOSPADM

## 2021-06-28 RX ORDER — IBUPROFEN 200 MG
600 TABLET ORAL EVERY 8 HOURS
Refills: 0 | COMMUNITY
Start: 2021-06-28 | End: 2021-07-01

## 2021-06-28 RX ORDER — ONDANSETRON 2 MG/ML
4 INJECTION INTRAMUSCULAR; INTRAVENOUS ONCE AS NEEDED
Status: DISCONTINUED | OUTPATIENT
Start: 2021-06-28 | End: 2021-06-28 | Stop reason: HOSPADM

## 2021-06-28 RX ORDER — SODIUM CHLORIDE 9 MG/ML
INJECTION, SOLUTION INTRAVENOUS CONTINUOUS
Status: DISCONTINUED | OUTPATIENT
Start: 2021-06-28 | End: 2021-06-28 | Stop reason: HOSPADM

## 2021-06-28 RX ORDER — HYDROMORPHONE HYDROCHLORIDE 2 MG/ML
INJECTION, SOLUTION INTRAMUSCULAR; INTRAVENOUS; SUBCUTANEOUS
Status: COMPLETED
Start: 2021-06-28 | End: 2021-06-28

## 2021-06-28 RX ORDER — NEOSTIGMINE METHYLSULFATE 1 MG/ML
INJECTION, SOLUTION INTRAVENOUS
Status: DISCONTINUED | OUTPATIENT
Start: 2021-06-28 | End: 2021-06-28

## 2021-06-28 RX ORDER — OXYCODONE HYDROCHLORIDE 5 MG/1
5 TABLET ORAL EVERY 6 HOURS PRN
Qty: 20 TABLET | Refills: 0 | Status: SHIPPED | OUTPATIENT
Start: 2021-06-28 | End: 2021-07-05

## 2021-06-28 RX ORDER — ACETAMINOPHEN 10 MG/ML
INJECTION, SOLUTION INTRAVENOUS
Status: DISCONTINUED | OUTPATIENT
Start: 2021-06-28 | End: 2021-06-28

## 2021-06-28 RX ORDER — PROPOFOL 10 MG/ML
VIAL (ML) INTRAVENOUS
Status: DISCONTINUED | OUTPATIENT
Start: 2021-06-28 | End: 2021-06-28

## 2021-06-28 RX ORDER — KETOROLAC TROMETHAMINE 30 MG/ML
INJECTION, SOLUTION INTRAMUSCULAR; INTRAVENOUS
Status: DISCONTINUED | OUTPATIENT
Start: 2021-06-28 | End: 2021-06-28

## 2021-06-28 RX ORDER — ACETAMINOPHEN 500 MG
1000 TABLET ORAL EVERY 8 HOURS
Refills: 0 | COMMUNITY
Start: 2021-06-28 | End: 2021-07-01

## 2021-06-28 RX ADMIN — EPHEDRINE SULFATE 5 MG: 50 INJECTION, SOLUTION INTRAMUSCULAR; INTRAVENOUS; SUBCUTANEOUS at 02:06

## 2021-06-28 RX ADMIN — NEOSTIGMINE METHYLSULFATE 3 MG: 1 INJECTION INTRAVENOUS at 03:06

## 2021-06-28 RX ADMIN — ONDANSETRON 8 MG: 2 INJECTION, SOLUTION INTRAMUSCULAR; INTRAVENOUS at 03:06

## 2021-06-28 RX ADMIN — HYDROMORPHONE HYDROCHLORIDE 0.5 MG: 2 INJECTION INTRAMUSCULAR; INTRAVENOUS; SUBCUTANEOUS at 04:06

## 2021-06-28 RX ADMIN — FENTANYL CITRATE 50 MCG: 50 INJECTION, SOLUTION INTRAMUSCULAR; INTRAVENOUS at 02:06

## 2021-06-28 RX ADMIN — FENTANYL CITRATE 100 MCG: 50 INJECTION, SOLUTION INTRAMUSCULAR; INTRAVENOUS at 02:06

## 2021-06-28 RX ADMIN — PROPOFOL 150 MG: 10 INJECTION, EMULSION INTRAVENOUS at 02:06

## 2021-06-28 RX ADMIN — CEFAZOLIN SODIUM 2 G: 2 SOLUTION INTRAVENOUS at 02:06

## 2021-06-28 RX ADMIN — SODIUM CHLORIDE, SODIUM LACTATE, POTASSIUM CHLORIDE, AND CALCIUM CHLORIDE: .6; .31; .03; .02 INJECTION, SOLUTION INTRAVENOUS at 01:06

## 2021-06-28 RX ADMIN — FENTANYL CITRATE 25 MCG: 50 INJECTION, SOLUTION INTRAMUSCULAR; INTRAVENOUS at 04:06

## 2021-06-28 RX ADMIN — LIDOCAINE HYDROCHLORIDE 100 MG: 20 INJECTION, SOLUTION INTRAVENOUS at 02:06

## 2021-06-28 RX ADMIN — FENTANYL CITRATE 25 MCG: 50 INJECTION, SOLUTION INTRAMUSCULAR; INTRAVENOUS at 03:06

## 2021-06-28 RX ADMIN — KETOROLAC TROMETHAMINE 30 MG: 30 INJECTION, SOLUTION INTRAMUSCULAR; INTRAVENOUS at 03:06

## 2021-06-28 RX ADMIN — OXYCODONE 5 MG: 5 TABLET ORAL at 05:06

## 2021-06-28 RX ADMIN — ACETAMINOPHEN 1000 MG: 10 INJECTION, SOLUTION INTRAVENOUS at 02:06

## 2021-06-28 RX ADMIN — ROCURONIUM BROMIDE 50 MG: 10 INJECTION, SOLUTION INTRAVENOUS at 02:06

## 2021-06-28 RX ADMIN — SODIUM CHLORIDE, SODIUM LACTATE, POTASSIUM CHLORIDE, AND CALCIUM CHLORIDE: .6; .31; .03; .02 INJECTION, SOLUTION INTRAVENOUS at 03:06

## 2021-06-28 RX ADMIN — GLYCOPYRROLATE 0.4 MG: 0.2 INJECTION, SOLUTION INTRAMUSCULAR; INTRAVITREAL at 03:06

## 2021-06-28 RX ADMIN — FENTANYL CITRATE 50 MCG: 50 INJECTION, SOLUTION INTRAMUSCULAR; INTRAVENOUS at 03:06

## 2021-06-28 RX ADMIN — PROPOFOL 50 MG: 10 INJECTION, EMULSION INTRAVENOUS at 03:06

## 2021-06-28 RX ADMIN — DEXAMETHASONE SODIUM PHOSPHATE 8 MG: 4 INJECTION, SOLUTION INTRA-ARTICULAR; INTRALESIONAL; INTRAMUSCULAR; INTRAVENOUS; SOFT TISSUE at 02:06

## 2021-07-02 ENCOUNTER — TELEPHONE (OUTPATIENT)
Dept: SURGERY | Facility: CLINIC | Age: 66
End: 2021-07-02

## 2021-07-16 ENCOUNTER — OFFICE VISIT (OUTPATIENT)
Dept: SURGERY | Facility: CLINIC | Age: 66
End: 2021-07-16
Payer: MEDICARE

## 2021-07-16 ENCOUNTER — HOSPITAL ENCOUNTER (OUTPATIENT)
Dept: RADIOLOGY | Facility: HOSPITAL | Age: 66
Discharge: HOME | End: 2021-07-16
Attending: FAMILY MEDICINE
Payer: MEDICARE

## 2021-07-16 VITALS
BODY MASS INDEX: 19.2 KG/M2 | OXYGEN SATURATION: 99 % | HEART RATE: 78 BPM | DIASTOLIC BLOOD PRESSURE: 74 MMHG | SYSTOLIC BLOOD PRESSURE: 131 MMHG | WEIGHT: 95.25 LBS | HEIGHT: 59 IN | TEMPERATURE: 98 F

## 2021-07-16 DIAGNOSIS — Z87.19 S/P REPAIR OF VENTRAL HERNIA: Primary | ICD-10-CM

## 2021-07-16 DIAGNOSIS — R92.8 ABNORMAL MAMMOGRAM: ICD-10-CM

## 2021-07-16 DIAGNOSIS — Z98.890 S/P REPAIR OF VENTRAL HERNIA: Primary | ICD-10-CM

## 2021-07-16 PROCEDURE — 77061 MAMMO DIGITAL DIAGNOSTIC LEFT WITH TOMO: ICD-10-PCS | Mod: 26,LT,, | Performed by: RADIOLOGY

## 2021-07-16 PROCEDURE — 76642 ULTRASOUND BREAST LIMITED: CPT | Mod: 26,LT,, | Performed by: RADIOLOGY

## 2021-07-16 PROCEDURE — 1126F PR PAIN SEVERITY QUANTIFIED, NO PAIN PRESENT: ICD-10-PCS | Mod: S$GLB,,, | Performed by: SURGERY

## 2021-07-16 PROCEDURE — 3008F PR BODY MASS INDEX (BMI) DOCUMENTED: ICD-10-PCS | Mod: CPTII,S$GLB,, | Performed by: SURGERY

## 2021-07-16 PROCEDURE — 99999 PR PBB SHADOW E&M-EST. PATIENT-LVL III: CPT | Mod: PBBFAC,,, | Performed by: SURGERY

## 2021-07-16 PROCEDURE — 99024 POSTOP FOLLOW-UP VISIT: CPT | Mod: S$GLB,,, | Performed by: SURGERY

## 2021-07-16 PROCEDURE — 76642 ULTRASOUND BREAST LIMITED: CPT | Mod: TC,LT

## 2021-07-16 PROCEDURE — 77065 MAMMO DIGITAL DIAGNOSTIC LEFT WITH TOMO: ICD-10-PCS | Mod: 26,LT,, | Performed by: RADIOLOGY

## 2021-07-16 PROCEDURE — 77061 BREAST TOMOSYNTHESIS UNI: CPT | Mod: 26,LT,, | Performed by: RADIOLOGY

## 2021-07-16 PROCEDURE — 99024 PR POST-OP FOLLOW-UP VISIT: ICD-10-PCS | Mod: S$GLB,,, | Performed by: SURGERY

## 2021-07-16 PROCEDURE — 3008F BODY MASS INDEX DOCD: CPT | Mod: CPTII,S$GLB,, | Performed by: SURGERY

## 2021-07-16 PROCEDURE — 76642 US BREAST LEFT LIMITED: ICD-10-PCS | Mod: 26,LT,, | Performed by: RADIOLOGY

## 2021-07-16 PROCEDURE — 77065 DX MAMMO INCL CAD UNI: CPT | Mod: 26,LT,, | Performed by: RADIOLOGY

## 2021-07-16 PROCEDURE — 1126F AMNT PAIN NOTED NONE PRSNT: CPT | Mod: S$GLB,,, | Performed by: SURGERY

## 2021-07-16 PROCEDURE — 77061 BREAST TOMOSYNTHESIS UNI: CPT | Mod: TC,LT

## 2021-07-16 PROCEDURE — 99999 PR PBB SHADOW E&M-EST. PATIENT-LVL III: ICD-10-PCS | Mod: PBBFAC,,, | Performed by: SURGERY

## 2021-08-17 ENCOUNTER — NURSE TRIAGE (OUTPATIENT)
Dept: ADMINISTRATIVE | Facility: CLINIC | Age: 66
End: 2021-08-17

## 2021-08-17 ENCOUNTER — TELEPHONE (OUTPATIENT)
Dept: FAMILY MEDICINE | Facility: CLINIC | Age: 66
End: 2021-08-17

## 2021-08-23 ENCOUNTER — OFFICE VISIT (OUTPATIENT)
Dept: FAMILY MEDICINE | Facility: CLINIC | Age: 66
End: 2021-08-23
Payer: MEDICARE

## 2021-08-23 VITALS
WEIGHT: 92.56 LBS | BODY MASS INDEX: 18.66 KG/M2 | DIASTOLIC BLOOD PRESSURE: 70 MMHG | OXYGEN SATURATION: 98 % | HEIGHT: 59 IN | SYSTOLIC BLOOD PRESSURE: 110 MMHG | HEART RATE: 100 BPM | TEMPERATURE: 99 F

## 2021-08-23 DIAGNOSIS — F32.A ANXIETY AND DEPRESSION: ICD-10-CM

## 2021-08-23 DIAGNOSIS — I10 BENIGN ESSENTIAL HYPERTENSION: Primary | ICD-10-CM

## 2021-08-23 DIAGNOSIS — Z86.73 HISTORY OF STROKE: ICD-10-CM

## 2021-08-23 DIAGNOSIS — I10 BENIGN ESSENTIAL HYPERTENSION: ICD-10-CM

## 2021-08-23 DIAGNOSIS — Z79.82 LONG-TERM USE OF ASPIRIN THERAPY: ICD-10-CM

## 2021-08-23 DIAGNOSIS — E78.2 MIXED HYPERLIPIDEMIA: ICD-10-CM

## 2021-08-23 DIAGNOSIS — F41.9 ANXIETY AND DEPRESSION: ICD-10-CM

## 2021-08-23 PROCEDURE — 99999 PR PBB SHADOW E&M-EST. PATIENT-LVL IV: ICD-10-PCS | Mod: PBBFAC,,, | Performed by: FAMILY MEDICINE

## 2021-08-23 PROCEDURE — 3008F PR BODY MASS INDEX (BMI) DOCUMENTED: ICD-10-PCS | Mod: CPTII,S$GLB,, | Performed by: FAMILY MEDICINE

## 2021-08-23 PROCEDURE — 1126F PR PAIN SEVERITY QUANTIFIED, NO PAIN PRESENT: ICD-10-PCS | Mod: CPTII,S$GLB,, | Performed by: FAMILY MEDICINE

## 2021-08-23 PROCEDURE — 99215 OFFICE O/P EST HI 40 MIN: CPT | Mod: S$GLB,,, | Performed by: FAMILY MEDICINE

## 2021-08-23 PROCEDURE — 1159F MED LIST DOCD IN RCRD: CPT | Mod: CPTII,S$GLB,, | Performed by: FAMILY MEDICINE

## 2021-08-23 PROCEDURE — 3078F DIAST BP <80 MM HG: CPT | Mod: CPTII,S$GLB,, | Performed by: FAMILY MEDICINE

## 2021-08-23 PROCEDURE — 3078F PR MOST RECENT DIASTOLIC BLOOD PRESSURE < 80 MM HG: ICD-10-PCS | Mod: CPTII,S$GLB,, | Performed by: FAMILY MEDICINE

## 2021-08-23 PROCEDURE — 1160F PR REVIEW ALL MEDS BY PRESCRIBER/CLIN PHARMACIST DOCUMENTED: ICD-10-PCS | Mod: CPTII,S$GLB,, | Performed by: FAMILY MEDICINE

## 2021-08-23 PROCEDURE — 1160F RVW MEDS BY RX/DR IN RCRD: CPT | Mod: CPTII,S$GLB,, | Performed by: FAMILY MEDICINE

## 2021-08-23 PROCEDURE — 1126F AMNT PAIN NOTED NONE PRSNT: CPT | Mod: CPTII,S$GLB,, | Performed by: FAMILY MEDICINE

## 2021-08-23 PROCEDURE — 99215 PR OFFICE/OUTPT VISIT, EST, LEVL V, 40-54 MIN: ICD-10-PCS | Mod: S$GLB,,, | Performed by: FAMILY MEDICINE

## 2021-08-23 PROCEDURE — 3074F SYST BP LT 130 MM HG: CPT | Mod: CPTII,S$GLB,, | Performed by: FAMILY MEDICINE

## 2021-08-23 PROCEDURE — 3074F PR MOST RECENT SYSTOLIC BLOOD PRESSURE < 130 MM HG: ICD-10-PCS | Mod: CPTII,S$GLB,, | Performed by: FAMILY MEDICINE

## 2021-08-23 PROCEDURE — 3008F BODY MASS INDEX DOCD: CPT | Mod: CPTII,S$GLB,, | Performed by: FAMILY MEDICINE

## 2021-08-23 PROCEDURE — 99999 PR PBB SHADOW E&M-EST. PATIENT-LVL IV: CPT | Mod: PBBFAC,,, | Performed by: FAMILY MEDICINE

## 2021-08-23 PROCEDURE — 1159F PR MEDICATION LIST DOCUMENTED IN MEDICAL RECORD: ICD-10-PCS | Mod: CPTII,S$GLB,, | Performed by: FAMILY MEDICINE

## 2021-08-23 RX ORDER — ATORVASTATIN CALCIUM 80 MG/1
80 TABLET, FILM COATED ORAL DAILY
Qty: 90 TABLET | Refills: 3 | Status: SHIPPED | OUTPATIENT
Start: 2021-08-23 | End: 2021-10-01 | Stop reason: SDUPTHER

## 2021-08-23 RX ORDER — LISINOPRIL 10 MG/1
10 TABLET ORAL 2 TIMES DAILY
Qty: 90 TABLET | Refills: 4 | Status: SHIPPED | OUTPATIENT
Start: 2021-08-23 | End: 2022-01-31 | Stop reason: SDUPTHER

## 2021-09-13 ENCOUNTER — LAB VISIT (OUTPATIENT)
Dept: LAB | Facility: HOSPITAL | Age: 66
End: 2021-09-13
Attending: FAMILY MEDICINE
Payer: MEDICARE

## 2021-09-13 DIAGNOSIS — Z79.899 MEDICATION MANAGEMENT: ICD-10-CM

## 2021-09-13 DIAGNOSIS — E44.1 PROTEIN-CALORIE MALNUTRITION, MILD: ICD-10-CM

## 2021-09-13 DIAGNOSIS — E78.2 MIXED HYPERLIPIDEMIA: ICD-10-CM

## 2021-09-13 LAB
25(OH)D3+25(OH)D2 SERPL-MCNC: 66 NG/ML (ref 30–96)
ALBUMIN SERPL BCP-MCNC: 4.2 G/DL (ref 3.5–5.2)
ALP SERPL-CCNC: 92 U/L (ref 55–135)
ALT SERPL W/O P-5'-P-CCNC: 14 U/L (ref 10–44)
ANION GAP SERPL CALC-SCNC: 11 MMOL/L (ref 8–16)
AST SERPL-CCNC: 26 U/L (ref 10–40)
BASOPHILS # BLD AUTO: 0.08 K/UL (ref 0–0.2)
BASOPHILS NFR BLD: 1 % (ref 0–1.9)
BILIRUB SERPL-MCNC: 0.6 MG/DL (ref 0.1–1)
BUN SERPL-MCNC: 6 MG/DL (ref 8–23)
CALCIUM SERPL-MCNC: 9.8 MG/DL (ref 8.7–10.5)
CHLORIDE SERPL-SCNC: 105 MMOL/L (ref 95–110)
CHOLEST SERPL-MCNC: 120 MG/DL (ref 120–199)
CHOLEST/HDLC SERPL: 2 {RATIO} (ref 2–5)
CO2 SERPL-SCNC: 25 MMOL/L (ref 23–29)
CREAT SERPL-MCNC: 0.8 MG/DL (ref 0.5–1.4)
DIFFERENTIAL METHOD: NORMAL
EOSINOPHIL # BLD AUTO: 0.5 K/UL (ref 0–0.5)
EOSINOPHIL NFR BLD: 6.1 % (ref 0–8)
ERYTHROCYTE [DISTWIDTH] IN BLOOD BY AUTOMATED COUNT: 13.2 % (ref 11.5–14.5)
EST. GFR  (AFRICAN AMERICAN): >60 ML/MIN/1.73 M^2
EST. GFR  (NON AFRICAN AMERICAN): >60 ML/MIN/1.73 M^2
GLUCOSE SERPL-MCNC: 102 MG/DL (ref 70–110)
HCT VFR BLD AUTO: 40.9 % (ref 37–48.5)
HDLC SERPL-MCNC: 60 MG/DL (ref 40–75)
HDLC SERPL: 50 % (ref 20–50)
HGB BLD-MCNC: 13.4 G/DL (ref 12–16)
IMM GRANULOCYTES # BLD AUTO: 0.04 K/UL (ref 0–0.04)
IMM GRANULOCYTES NFR BLD AUTO: 0.5 % (ref 0–0.5)
LDLC SERPL CALC-MCNC: 36.4 MG/DL (ref 63–159)
LYMPHOCYTES # BLD AUTO: 1.9 K/UL (ref 1–4.8)
LYMPHOCYTES NFR BLD: 25.2 % (ref 18–48)
MAGNESIUM SERPL-MCNC: 1.5 MG/DL (ref 1.6–2.6)
MCH RBC QN AUTO: 30.9 PG (ref 27–31)
MCHC RBC AUTO-ENTMCNC: 32.8 G/DL (ref 32–36)
MCV RBC AUTO: 95 FL (ref 82–98)
MONOCYTES # BLD AUTO: 0.6 K/UL (ref 0.3–1)
MONOCYTES NFR BLD: 7.4 % (ref 4–15)
NEUTROPHILS # BLD AUTO: 4.6 K/UL (ref 1.8–7.7)
NEUTROPHILS NFR BLD: 59.8 % (ref 38–73)
NONHDLC SERPL-MCNC: 60 MG/DL
NRBC BLD-RTO: 0 /100 WBC
PLATELET # BLD AUTO: 241 K/UL (ref 150–450)
PMV BLD AUTO: 10 FL (ref 9.2–12.9)
POTASSIUM SERPL-SCNC: 4.3 MMOL/L (ref 3.5–5.1)
PREALB SERPL-MCNC: 25 MG/DL (ref 20–43)
PROT SERPL-MCNC: 7.6 G/DL (ref 6–8.4)
RBC # BLD AUTO: 4.33 M/UL (ref 4–5.4)
SODIUM SERPL-SCNC: 141 MMOL/L (ref 136–145)
T4 FREE SERPL-MCNC: 0.9 NG/DL (ref 0.71–1.51)
TRIGL SERPL-MCNC: 118 MG/DL (ref 30–150)
TSH SERPL DL<=0.005 MIU/L-ACNC: 4.81 UIU/ML (ref 0.4–4)
VIT B12 SERPL-MCNC: 491 PG/ML (ref 210–950)
WBC # BLD AUTO: 7.69 K/UL (ref 3.9–12.7)

## 2021-09-13 PROCEDURE — 80053 COMPREHEN METABOLIC PANEL: CPT | Performed by: FAMILY MEDICINE

## 2021-09-13 PROCEDURE — 82607 VITAMIN B-12: CPT | Performed by: FAMILY MEDICINE

## 2021-09-13 PROCEDURE — 80061 LIPID PANEL: CPT | Performed by: FAMILY MEDICINE

## 2021-09-13 PROCEDURE — 84439 ASSAY OF FREE THYROXINE: CPT | Performed by: FAMILY MEDICINE

## 2021-09-13 PROCEDURE — 82306 VITAMIN D 25 HYDROXY: CPT | Performed by: FAMILY MEDICINE

## 2021-09-13 PROCEDURE — 85025 COMPLETE CBC W/AUTO DIFF WBC: CPT | Performed by: FAMILY MEDICINE

## 2021-09-13 PROCEDURE — 84443 ASSAY THYROID STIM HORMONE: CPT | Performed by: FAMILY MEDICINE

## 2021-09-13 PROCEDURE — 83735 ASSAY OF MAGNESIUM: CPT | Performed by: FAMILY MEDICINE

## 2021-09-13 PROCEDURE — 84134 ASSAY OF PREALBUMIN: CPT | Performed by: FAMILY MEDICINE

## 2021-09-13 PROCEDURE — 36415 COLL VENOUS BLD VENIPUNCTURE: CPT | Performed by: FAMILY MEDICINE

## 2021-10-27 ENCOUNTER — OFFICE VISIT (OUTPATIENT)
Dept: FAMILY MEDICINE | Facility: CLINIC | Age: 66
End: 2021-10-27
Payer: MEDICARE

## 2021-10-27 VITALS
BODY MASS INDEX: 19.06 KG/M2 | HEART RATE: 74 BPM | SYSTOLIC BLOOD PRESSURE: 114 MMHG | DIASTOLIC BLOOD PRESSURE: 78 MMHG | HEIGHT: 59 IN | OXYGEN SATURATION: 98 % | WEIGHT: 94.56 LBS

## 2021-10-27 DIAGNOSIS — E55.9 VITAMIN D DEFICIENCY: ICD-10-CM

## 2021-10-27 DIAGNOSIS — Z79.899 MEDICATION MANAGEMENT: ICD-10-CM

## 2021-10-27 DIAGNOSIS — K08.9 POOR DENTITION: ICD-10-CM

## 2021-10-27 DIAGNOSIS — R79.89 ABNORMAL TSH: ICD-10-CM

## 2021-10-27 DIAGNOSIS — G47.00 INSOMNIA, UNSPECIFIED TYPE: ICD-10-CM

## 2021-10-27 DIAGNOSIS — R79.0 LOW MAGNESIUM LEVEL: ICD-10-CM

## 2021-10-27 DIAGNOSIS — Z87.898 HISTORY OF SEIZURES: ICD-10-CM

## 2021-10-27 DIAGNOSIS — Z86.73 HISTORY OF STROKE: ICD-10-CM

## 2021-10-27 DIAGNOSIS — Z79.82 LONG-TERM USE OF ASPIRIN THERAPY: ICD-10-CM

## 2021-10-27 DIAGNOSIS — E78.2 MIXED HYPERLIPIDEMIA: ICD-10-CM

## 2021-10-27 DIAGNOSIS — E44.1 PROTEIN-CALORIE MALNUTRITION, MILD: ICD-10-CM

## 2021-10-27 DIAGNOSIS — F41.9 ANXIETY AND DEPRESSION: ICD-10-CM

## 2021-10-27 DIAGNOSIS — F32.A ANXIETY AND DEPRESSION: ICD-10-CM

## 2021-10-27 DIAGNOSIS — M81.0 POSTMENOPAUSAL OSTEOPOROSIS: ICD-10-CM

## 2021-10-27 DIAGNOSIS — I10 BENIGN ESSENTIAL HYPERTENSION: Primary | ICD-10-CM

## 2021-10-27 PROCEDURE — 90694 VACC AIIV4 NO PRSRV 0.5ML IM: CPT | Mod: S$GLB,,, | Performed by: FAMILY MEDICINE

## 2021-10-27 PROCEDURE — G0008 ADMIN INFLUENZA VIRUS VAC: HCPCS | Mod: S$GLB,,, | Performed by: FAMILY MEDICINE

## 2021-10-27 PROCEDURE — 99214 OFFICE O/P EST MOD 30 MIN: CPT | Mod: 25,S$GLB,, | Performed by: FAMILY MEDICINE

## 2021-10-27 PROCEDURE — 99999 PR PBB SHADOW E&M-EST. PATIENT-LVL III: ICD-10-PCS | Mod: PBBFAC,,, | Performed by: FAMILY MEDICINE

## 2021-10-27 PROCEDURE — 4010F PR ACE/ARB THEARPY RXD/TAKEN: ICD-10-PCS | Mod: CPTII,S$GLB,, | Performed by: FAMILY MEDICINE

## 2021-10-27 PROCEDURE — 3078F PR MOST RECENT DIASTOLIC BLOOD PRESSURE < 80 MM HG: ICD-10-PCS | Mod: CPTII,S$GLB,, | Performed by: FAMILY MEDICINE

## 2021-10-27 PROCEDURE — 3074F SYST BP LT 130 MM HG: CPT | Mod: CPTII,S$GLB,, | Performed by: FAMILY MEDICINE

## 2021-10-27 PROCEDURE — 1159F MED LIST DOCD IN RCRD: CPT | Mod: CPTII,S$GLB,, | Performed by: FAMILY MEDICINE

## 2021-10-27 PROCEDURE — 99499 RISK ADDL DX/OHS AUDIT: ICD-10-PCS | Mod: S$GLB,,, | Performed by: FAMILY MEDICINE

## 2021-10-27 PROCEDURE — 3074F PR MOST RECENT SYSTOLIC BLOOD PRESSURE < 130 MM HG: ICD-10-PCS | Mod: CPTII,S$GLB,, | Performed by: FAMILY MEDICINE

## 2021-10-27 PROCEDURE — 3008F BODY MASS INDEX DOCD: CPT | Mod: CPTII,S$GLB,, | Performed by: FAMILY MEDICINE

## 2021-10-27 PROCEDURE — G0008 FLU VACCINE - QUADRIVALENT - ADJUVANTED: ICD-10-PCS | Mod: S$GLB,,, | Performed by: FAMILY MEDICINE

## 2021-10-27 PROCEDURE — 1159F PR MEDICATION LIST DOCUMENTED IN MEDICAL RECORD: ICD-10-PCS | Mod: CPTII,S$GLB,, | Performed by: FAMILY MEDICINE

## 2021-10-27 PROCEDURE — 1160F RVW MEDS BY RX/DR IN RCRD: CPT | Mod: CPTII,S$GLB,, | Performed by: FAMILY MEDICINE

## 2021-10-27 PROCEDURE — 4010F ACE/ARB THERAPY RXD/TAKEN: CPT | Mod: CPTII,S$GLB,, | Performed by: FAMILY MEDICINE

## 2021-10-27 PROCEDURE — 3078F DIAST BP <80 MM HG: CPT | Mod: CPTII,S$GLB,, | Performed by: FAMILY MEDICINE

## 2021-10-27 PROCEDURE — 99999 PR PBB SHADOW E&M-EST. PATIENT-LVL III: CPT | Mod: PBBFAC,,, | Performed by: FAMILY MEDICINE

## 2021-10-27 PROCEDURE — 3008F PR BODY MASS INDEX (BMI) DOCUMENTED: ICD-10-PCS | Mod: CPTII,S$GLB,, | Performed by: FAMILY MEDICINE

## 2021-10-27 PROCEDURE — 99499 UNLISTED E&M SERVICE: CPT | Mod: S$GLB,,, | Performed by: FAMILY MEDICINE

## 2021-10-27 PROCEDURE — 1160F PR REVIEW ALL MEDS BY PRESCRIBER/CLIN PHARMACIST DOCUMENTED: ICD-10-PCS | Mod: CPTII,S$GLB,, | Performed by: FAMILY MEDICINE

## 2021-10-27 PROCEDURE — 90694 FLU VACCINE - QUADRIVALENT - ADJUVANTED: ICD-10-PCS | Mod: S$GLB,,, | Performed by: FAMILY MEDICINE

## 2021-10-27 PROCEDURE — 99214 PR OFFICE/OUTPT VISIT, EST, LEVL IV, 30-39 MIN: ICD-10-PCS | Mod: 25,S$GLB,, | Performed by: FAMILY MEDICINE

## 2021-10-27 RX ORDER — MIRTAZAPINE 7.5 MG/1
TABLET, FILM COATED ORAL
Qty: 30 TABLET | Refills: 11 | Status: SHIPPED | OUTPATIENT
Start: 2021-10-27 | End: 2022-07-08

## 2022-01-27 ENCOUNTER — LAB VISIT (OUTPATIENT)
Dept: LAB | Facility: HOSPITAL | Age: 67
End: 2022-01-27
Attending: FAMILY MEDICINE
Payer: MEDICARE

## 2022-01-27 DIAGNOSIS — Z79.899 MEDICATION MANAGEMENT: ICD-10-CM

## 2022-01-27 DIAGNOSIS — R79.0 LOW MAGNESIUM LEVEL: ICD-10-CM

## 2022-01-27 DIAGNOSIS — R79.89 ABNORMAL TSH: ICD-10-CM

## 2022-01-27 DIAGNOSIS — I10 BENIGN ESSENTIAL HYPERTENSION: ICD-10-CM

## 2022-01-27 LAB
ALBUMIN SERPL BCP-MCNC: 4.1 G/DL (ref 3.5–5.2)
ALP SERPL-CCNC: 71 U/L (ref 55–135)
ALT SERPL W/O P-5'-P-CCNC: 35 U/L (ref 10–44)
ANION GAP SERPL CALC-SCNC: 9 MMOL/L (ref 8–16)
AST SERPL-CCNC: 30 U/L (ref 10–40)
BILIRUB SERPL-MCNC: 0.7 MG/DL (ref 0.1–1)
BUN SERPL-MCNC: 12 MG/DL (ref 8–23)
CALCIUM SERPL-MCNC: 9.7 MG/DL (ref 8.7–10.5)
CHLORIDE SERPL-SCNC: 107 MMOL/L (ref 95–110)
CO2 SERPL-SCNC: 25 MMOL/L (ref 23–29)
CREAT SERPL-MCNC: 0.8 MG/DL (ref 0.5–1.4)
EST. GFR  (AFRICAN AMERICAN): >60 ML/MIN/1.73 M^2
EST. GFR  (NON AFRICAN AMERICAN): >60 ML/MIN/1.73 M^2
GLUCOSE SERPL-MCNC: 97 MG/DL (ref 70–110)
MAGNESIUM SERPL-MCNC: 1.5 MG/DL (ref 1.6–2.6)
POTASSIUM SERPL-SCNC: 3.7 MMOL/L (ref 3.5–5.1)
PROT SERPL-MCNC: 7.1 G/DL (ref 6–8.4)
SODIUM SERPL-SCNC: 141 MMOL/L (ref 136–145)
T4 FREE SERPL-MCNC: 0.83 NG/DL (ref 0.71–1.51)
TSH SERPL DL<=0.005 MIU/L-ACNC: 4.52 UIU/ML (ref 0.4–4)

## 2022-01-27 PROCEDURE — 84439 ASSAY OF FREE THYROXINE: CPT | Performed by: FAMILY MEDICINE

## 2022-01-27 PROCEDURE — 84443 ASSAY THYROID STIM HORMONE: CPT | Performed by: FAMILY MEDICINE

## 2022-01-27 PROCEDURE — 36415 COLL VENOUS BLD VENIPUNCTURE: CPT | Performed by: FAMILY MEDICINE

## 2022-01-27 PROCEDURE — 83735 ASSAY OF MAGNESIUM: CPT | Performed by: FAMILY MEDICINE

## 2022-01-27 PROCEDURE — 80053 COMPREHEN METABOLIC PANEL: CPT | Performed by: FAMILY MEDICINE

## 2022-01-30 PROBLEM — K43.2 INCISIONAL HERNIA, WITHOUT OBSTRUCTION OR GANGRENE: Status: RESOLVED | Noted: 2021-06-28 | Resolved: 2022-01-30

## 2022-01-30 PROBLEM — Z85.828 HISTORY OF SQUAMOUS CELL CARCINOMA OF SKIN: Status: ACTIVE | Noted: 2021-02-22

## 2022-01-30 PROBLEM — F33.0 MAJOR DEPRESSIVE DISORDER, RECURRENT, MILD: Status: RESOLVED | Noted: 2021-02-22 | Resolved: 2022-01-30

## 2022-01-30 PROBLEM — E44.1 PROTEIN-CALORIE MALNUTRITION, MILD: Status: RESOLVED | Noted: 2021-02-22 | Resolved: 2022-01-30
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
## 2022-01-31 ENCOUNTER — OFFICE VISIT (OUTPATIENT)
Dept: FAMILY MEDICINE | Facility: CLINIC | Age: 67
End: 2022-01-31
Payer: MEDICARE

## 2022-01-31 VITALS
SYSTOLIC BLOOD PRESSURE: 110 MMHG | BODY MASS INDEX: 19.12 KG/M2 | HEIGHT: 59 IN | OXYGEN SATURATION: 99 % | WEIGHT: 94.81 LBS | HEART RATE: 75 BPM | DIASTOLIC BLOOD PRESSURE: 73 MMHG

## 2022-01-31 DIAGNOSIS — Z86.73 HISTORY OF STROKE: ICD-10-CM

## 2022-01-31 DIAGNOSIS — M54.50 CHRONIC BILATERAL LOW BACK PAIN WITHOUT SCIATICA: ICD-10-CM

## 2022-01-31 DIAGNOSIS — Z23 NEED FOR VACCINATION WITH 13-POLYVALENT PNEUMOCOCCAL CONJUGATE VACCINE: ICD-10-CM

## 2022-01-31 DIAGNOSIS — M81.0 POSTMENOPAUSAL OSTEOPOROSIS: ICD-10-CM

## 2022-01-31 DIAGNOSIS — F41.9 ANXIETY AND DEPRESSION: ICD-10-CM

## 2022-01-31 DIAGNOSIS — Z79.899 MEDICATION MANAGEMENT: ICD-10-CM

## 2022-01-31 DIAGNOSIS — Z85.828 HISTORY OF SQUAMOUS CELL CARCINOMA OF SKIN: ICD-10-CM

## 2022-01-31 DIAGNOSIS — Z00.00 ROUTINE GENERAL MEDICAL EXAMINATION AT A HEALTH CARE FACILITY: Primary | ICD-10-CM

## 2022-01-31 DIAGNOSIS — E78.2 MIXED HYPERLIPIDEMIA: ICD-10-CM

## 2022-01-31 DIAGNOSIS — E83.42 HYPOMAGNESEMIA: ICD-10-CM

## 2022-01-31 DIAGNOSIS — Z98.890 H/O UMBILICAL HERNIA REPAIR: ICD-10-CM

## 2022-01-31 DIAGNOSIS — Z87.898 HISTORY OF SEIZURES: ICD-10-CM

## 2022-01-31 DIAGNOSIS — Z79.82 LONG-TERM USE OF ASPIRIN THERAPY: ICD-10-CM

## 2022-01-31 DIAGNOSIS — F32.A ANXIETY AND DEPRESSION: ICD-10-CM

## 2022-01-31 DIAGNOSIS — G89.29 CHRONIC BILATERAL LOW BACK PAIN WITHOUT SCIATICA: ICD-10-CM

## 2022-01-31 DIAGNOSIS — R79.89 ABNORMAL TSH: ICD-10-CM

## 2022-01-31 DIAGNOSIS — F10.11 HISTORY OF ALCOHOL ABUSE: ICD-10-CM

## 2022-01-31 DIAGNOSIS — Z87.891 FORMER SMOKER: ICD-10-CM

## 2022-01-31 DIAGNOSIS — E55.9 VITAMIN D DEFICIENCY: ICD-10-CM

## 2022-01-31 DIAGNOSIS — R19.7 DIARRHEA, UNSPECIFIED TYPE: ICD-10-CM

## 2022-01-31 DIAGNOSIS — I10 BENIGN ESSENTIAL HYPERTENSION: ICD-10-CM

## 2022-01-31 DIAGNOSIS — Z23 COVID-19 VACCINE SERIES STARTED: ICD-10-CM

## 2022-01-31 DIAGNOSIS — Z87.19 H/O UMBILICAL HERNIA REPAIR: ICD-10-CM

## 2022-01-31 PROCEDURE — 3074F PR MOST RECENT SYSTOLIC BLOOD PRESSURE < 130 MM HG: ICD-10-PCS | Mod: CPTII,S$GLB,, | Performed by: FAMILY MEDICINE

## 2022-01-31 PROCEDURE — 1160F RVW MEDS BY RX/DR IN RCRD: CPT | Mod: CPTII,S$GLB,, | Performed by: FAMILY MEDICINE

## 2022-01-31 PROCEDURE — 3008F BODY MASS INDEX DOCD: CPT | Mod: CPTII,S$GLB,, | Performed by: FAMILY MEDICINE

## 2022-01-31 PROCEDURE — 1160F PR REVIEW ALL MEDS BY PRESCRIBER/CLIN PHARMACIST DOCUMENTED: ICD-10-PCS | Mod: CPTII,S$GLB,, | Performed by: FAMILY MEDICINE

## 2022-01-31 PROCEDURE — 1126F PR PAIN SEVERITY QUANTIFIED, NO PAIN PRESENT: ICD-10-PCS | Mod: CPTII,S$GLB,, | Performed by: FAMILY MEDICINE

## 2022-01-31 PROCEDURE — 99999 PR PBB SHADOW E&M-EST. PATIENT-LVL IV: CPT | Mod: PBBFAC,,, | Performed by: FAMILY MEDICINE

## 2022-01-31 PROCEDURE — 1126F AMNT PAIN NOTED NONE PRSNT: CPT | Mod: CPTII,S$GLB,, | Performed by: FAMILY MEDICINE

## 2022-01-31 PROCEDURE — 99214 PR OFFICE/OUTPT VISIT, EST, LEVL IV, 30-39 MIN: ICD-10-PCS | Mod: S$GLB,,, | Performed by: FAMILY MEDICINE

## 2022-01-31 PROCEDURE — 3078F DIAST BP <80 MM HG: CPT | Mod: CPTII,S$GLB,, | Performed by: FAMILY MEDICINE

## 2022-01-31 PROCEDURE — 99499 UNLISTED E&M SERVICE: CPT | Mod: S$GLB,,, | Performed by: FAMILY MEDICINE

## 2022-01-31 PROCEDURE — 99499 RISK ADDL DX/OHS AUDIT: ICD-10-PCS | Mod: S$GLB,,, | Performed by: FAMILY MEDICINE

## 2022-01-31 PROCEDURE — 3288F FALL RISK ASSESSMENT DOCD: CPT | Mod: CPTII,S$GLB,, | Performed by: FAMILY MEDICINE

## 2022-01-31 PROCEDURE — 1101F PT FALLS ASSESS-DOCD LE1/YR: CPT | Mod: CPTII,S$GLB,, | Performed by: FAMILY MEDICINE

## 2022-01-31 PROCEDURE — 3074F SYST BP LT 130 MM HG: CPT | Mod: CPTII,S$GLB,, | Performed by: FAMILY MEDICINE

## 2022-01-31 PROCEDURE — 99214 OFFICE O/P EST MOD 30 MIN: CPT | Mod: S$GLB,,, | Performed by: FAMILY MEDICINE

## 2022-01-31 PROCEDURE — 3008F PR BODY MASS INDEX (BMI) DOCUMENTED: ICD-10-PCS | Mod: CPTII,S$GLB,, | Performed by: FAMILY MEDICINE

## 2022-01-31 PROCEDURE — 1159F MED LIST DOCD IN RCRD: CPT | Mod: CPTII,S$GLB,, | Performed by: FAMILY MEDICINE

## 2022-01-31 PROCEDURE — 3288F PR FALLS RISK ASSESSMENT DOCUMENTED: ICD-10-PCS | Mod: CPTII,S$GLB,, | Performed by: FAMILY MEDICINE

## 2022-01-31 PROCEDURE — 1159F PR MEDICATION LIST DOCUMENTED IN MEDICAL RECORD: ICD-10-PCS | Mod: CPTII,S$GLB,, | Performed by: FAMILY MEDICINE

## 2022-01-31 PROCEDURE — 99999 PR PBB SHADOW E&M-EST. PATIENT-LVL IV: ICD-10-PCS | Mod: PBBFAC,,, | Performed by: FAMILY MEDICINE

## 2022-01-31 PROCEDURE — 3078F PR MOST RECENT DIASTOLIC BLOOD PRESSURE < 80 MM HG: ICD-10-PCS | Mod: CPTII,S$GLB,, | Performed by: FAMILY MEDICINE

## 2022-01-31 PROCEDURE — 1101F PR PT FALLS ASSESS DOC 0-1 FALLS W/OUT INJ PAST YR: ICD-10-PCS | Mod: CPTII,S$GLB,, | Performed by: FAMILY MEDICINE

## 2022-01-31 RX ORDER — VITS A,C,E/LUTEIN/MINERALS 300MCG-200
1 TABLET ORAL NIGHTLY
Qty: 90 TABLET | Refills: 4 | Status: SHIPPED | OUTPATIENT
Start: 2022-01-31 | End: 2022-02-22

## 2022-01-31 RX ORDER — ALUMINUM ZIRCONIUM OCTACHLOROHYDREX GLY 16 G/100G
1 GEL TOPICAL
Qty: 90 CAPSULE | Refills: 3 | Status: SHIPPED | OUTPATIENT
Start: 2022-01-31 | End: 2022-04-21 | Stop reason: ALTCHOICE

## 2022-01-31 RX ORDER — LISINOPRIL 10 MG/1
10 TABLET ORAL DAILY
Qty: 90 TABLET | Refills: 4 | Status: SHIPPED | OUTPATIENT
Start: 2022-01-31 | End: 2022-03-30

## 2022-01-31 NOTE — PATIENT INSTRUCTIONS
TRY BENEFIBER DAILY MIXED WITH WATER TO HELP FORM STOOLS    ADD DAILY ALIGN PROBIOTIC DAILY FOR 90 DAYS TO HELP RE-ESTABLISH BACTERIAL BALANCE AND DECREASE YEAST INFECTION.

## 2022-02-21 ENCOUNTER — TELEPHONE (OUTPATIENT)
Dept: FAMILY MEDICINE | Facility: CLINIC | Age: 67
End: 2022-02-21
Payer: MEDICARE

## 2022-02-21 DIAGNOSIS — E83.42 HYPOMAGNESEMIA: ICD-10-CM

## 2022-02-21 NOTE — TELEPHONE ENCOUNTER
----- Message from Maria Luisa Davis sent at 2/21/2022 11:17 AM CST -----  Type: Requesting to speak with nurse         Who Called: Blanche garcia/ [facility]   Regarding: They have been trying to order patient medication   magnesium oxide 200 mg magnesium Tab   but it isnt available and wondering if patient could get the 400 instead and take half? Please advise   Would the patient rather a call back or a response via [facility]? Call back  Best Call Back Number: [phone]  Additional Information: n/a

## 2022-02-21 NOTE — TELEPHONE ENCOUNTER
200 mg of Magnesium oxide isn't available.  Blanche at [facility] pharmacy wants to know if they can fill 400 mg instead?  Please advise.

## 2022-02-22 RX ORDER — LANOLIN ALCOHOL/MO/W.PET/CERES
400 CREAM (GRAM) TOPICAL
Qty: 90 TABLET | Refills: 4 | Status: SHIPPED | OUTPATIENT
Start: 2022-02-22 | End: 2022-07-01 | Stop reason: CLARIF

## 2022-02-22 NOTE — TELEPHONE ENCOUNTER
Prescription updated to the 400 mg strength--she can take the whole 400 mg tablet. If she gets any diarrhea from it then she can cut the tablet in 1/2 and take 200 mg, but otherwise she can just go ahead and take the whole 400 mg, thanks/

## 2022-03-04 ENCOUNTER — PES CALL (OUTPATIENT)
Dept: ADMINISTRATIVE | Facility: CLINIC | Age: 67
End: 2022-03-04
Payer: MEDICARE

## 2022-03-07 ENCOUNTER — TELEPHONE (OUTPATIENT)
Dept: FAMILY MEDICINE | Facility: CLINIC | Age: 67
End: 2022-03-07
Payer: MEDICARE

## 2022-03-07 NOTE — TELEPHONE ENCOUNTER
Please schedule her for an appointment with me so we can do a pelvic exam and confirm the cause of the discharge and discuss her memory concerns, thanks.

## 2022-03-07 NOTE — TELEPHONE ENCOUNTER
Returned patient call about she has another yeast infection but she find it funny that it came on the day after her booster shot. She is trying to find out if a  yeast infection is something that is transmitted from partner to partner. She is taking monastat and the yeast is still coming out green. This is her last day of treatment. She went with the 7 day because she remember last time it happened it was bad, and caught it to late. Patient is concerned about this and need help. Patient do not remember hanging up with her boyfriend and getting on the phone with her girlfriend. Patient is not sure what happened.  Please advise.

## 2022-03-07 NOTE — TELEPHONE ENCOUNTER
----- Message from Nguyen Gaona sent at 3/7/2022 11:50 AM CST -----  Regarding: Pt Advice  Type:  Needs Medical Advice    Who Called: pt    Symptoms (please be specific): yeast infection/green discharge    How long has patient had these symptoms:  a week    Pharmacy name and phone #:  [id number] [place] - [address]  [address] [address]  Phone: [phone]       Would the patient rather a call back or a response via [facility]? Call    Best Call Back Number: [phone]

## 2022-03-07 NOTE — TELEPHONE ENCOUNTER
"Called patient to inform her of the message below from Dr. Flores. Patient was scheduled for Friday March 11, 2022 at 1:30 pm. Patient verbalized understanding.         "Please schedule her for an appointment with me so we can do a pelvic exam and confirm the cause of the discharge and discuss her memory concerns, thanks"  "

## 2022-03-11 ENCOUNTER — OFFICE VISIT (OUTPATIENT)
Dept: FAMILY MEDICINE | Facility: CLINIC | Age: 67
End: 2022-03-11
Payer: MEDICARE

## 2022-03-11 VITALS
BODY MASS INDEX: 20.13 KG/M2 | OXYGEN SATURATION: 98 % | SYSTOLIC BLOOD PRESSURE: 100 MMHG | DIASTOLIC BLOOD PRESSURE: 67 MMHG | HEART RATE: 82 BPM | WEIGHT: 99.88 LBS | HEIGHT: 59 IN

## 2022-03-11 DIAGNOSIS — N76.0 ACUTE VAGINITIS: Primary | ICD-10-CM

## 2022-03-11 DIAGNOSIS — Z86.73 HISTORY OF STROKE: ICD-10-CM

## 2022-03-11 DIAGNOSIS — Z12.31 ENCOUNTER FOR SCREENING MAMMOGRAM FOR BREAST CANCER: ICD-10-CM

## 2022-03-11 DIAGNOSIS — I10 BENIGN ESSENTIAL HYPERTENSION: ICD-10-CM

## 2022-03-11 DIAGNOSIS — Z87.898 HISTORY OF SEIZURES: ICD-10-CM

## 2022-03-11 DIAGNOSIS — Z79.82 LONG-TERM USE OF ASPIRIN THERAPY: ICD-10-CM

## 2022-03-11 PROCEDURE — 3288F PR FALLS RISK ASSESSMENT DOCUMENTED: ICD-10-PCS | Mod: CPTII,S$GLB,, | Performed by: FAMILY MEDICINE

## 2022-03-11 PROCEDURE — 87801 DETECT AGNT MULT DNA AMPLI: CPT | Performed by: FAMILY MEDICINE

## 2022-03-11 PROCEDURE — 99499 RISK ADDL DX/OHS AUDIT: ICD-10-PCS | Mod: S$GLB,,, | Performed by: FAMILY MEDICINE

## 2022-03-11 PROCEDURE — 1159F MED LIST DOCD IN RCRD: CPT | Mod: CPTII,S$GLB,, | Performed by: FAMILY MEDICINE

## 2022-03-11 PROCEDURE — 87481 CANDIDA DNA AMP PROBE: CPT | Mod: 59 | Performed by: FAMILY MEDICINE

## 2022-03-11 PROCEDURE — 1101F PR PT FALLS ASSESS DOC 0-1 FALLS W/OUT INJ PAST YR: ICD-10-PCS | Mod: CPTII,S$GLB,, | Performed by: FAMILY MEDICINE

## 2022-03-11 PROCEDURE — 4010F ACE/ARB THERAPY RXD/TAKEN: CPT | Mod: CPTII,S$GLB,, | Performed by: FAMILY MEDICINE

## 2022-03-11 PROCEDURE — 3008F PR BODY MASS INDEX (BMI) DOCUMENTED: ICD-10-PCS | Mod: CPTII,S$GLB,, | Performed by: FAMILY MEDICINE

## 2022-03-11 PROCEDURE — 99214 PR OFFICE/OUTPT VISIT, EST, LEVL IV, 30-39 MIN: ICD-10-PCS | Mod: S$GLB,,, | Performed by: FAMILY MEDICINE

## 2022-03-11 PROCEDURE — 3074F PR MOST RECENT SYSTOLIC BLOOD PRESSURE < 130 MM HG: ICD-10-PCS | Mod: CPTII,S$GLB,, | Performed by: FAMILY MEDICINE

## 2022-03-11 PROCEDURE — 1101F PT FALLS ASSESS-DOCD LE1/YR: CPT | Mod: CPTII,S$GLB,, | Performed by: FAMILY MEDICINE

## 2022-03-11 PROCEDURE — 99214 OFFICE O/P EST MOD 30 MIN: CPT | Mod: S$GLB,,, | Performed by: FAMILY MEDICINE

## 2022-03-11 PROCEDURE — 1159F PR MEDICATION LIST DOCUMENTED IN MEDICAL RECORD: ICD-10-PCS | Mod: CPTII,S$GLB,, | Performed by: FAMILY MEDICINE

## 2022-03-11 PROCEDURE — 1160F PR REVIEW ALL MEDS BY PRESCRIBER/CLIN PHARMACIST DOCUMENTED: ICD-10-PCS | Mod: CPTII,S$GLB,, | Performed by: FAMILY MEDICINE

## 2022-03-11 PROCEDURE — 99499 UNLISTED E&M SERVICE: CPT | Mod: S$GLB,,, | Performed by: FAMILY MEDICINE

## 2022-03-11 PROCEDURE — 1126F PR PAIN SEVERITY QUANTIFIED, NO PAIN PRESENT: ICD-10-PCS | Mod: CPTII,S$GLB,, | Performed by: FAMILY MEDICINE

## 2022-03-11 PROCEDURE — 3008F BODY MASS INDEX DOCD: CPT | Mod: CPTII,S$GLB,, | Performed by: FAMILY MEDICINE

## 2022-03-11 PROCEDURE — 3288F FALL RISK ASSESSMENT DOCD: CPT | Mod: CPTII,S$GLB,, | Performed by: FAMILY MEDICINE

## 2022-03-11 PROCEDURE — 3074F SYST BP LT 130 MM HG: CPT | Mod: CPTII,S$GLB,, | Performed by: FAMILY MEDICINE

## 2022-03-11 PROCEDURE — 1160F RVW MEDS BY RX/DR IN RCRD: CPT | Mod: CPTII,S$GLB,, | Performed by: FAMILY MEDICINE

## 2022-03-11 PROCEDURE — 1126F AMNT PAIN NOTED NONE PRSNT: CPT | Mod: CPTII,S$GLB,, | Performed by: FAMILY MEDICINE

## 2022-03-11 PROCEDURE — 99999 PR PBB SHADOW E&M-EST. PATIENT-LVL III: CPT | Mod: PBBFAC,,, | Performed by: FAMILY MEDICINE

## 2022-03-11 PROCEDURE — 4010F PR ACE/ARB THEARPY RXD/TAKEN: ICD-10-PCS | Mod: CPTII,S$GLB,, | Performed by: FAMILY MEDICINE

## 2022-03-11 PROCEDURE — 3078F PR MOST RECENT DIASTOLIC BLOOD PRESSURE < 80 MM HG: ICD-10-PCS | Mod: CPTII,S$GLB,, | Performed by: FAMILY MEDICINE

## 2022-03-11 PROCEDURE — 87491 CHLMYD TRACH DNA AMP PROBE: CPT | Mod: 59 | Performed by: FAMILY MEDICINE

## 2022-03-11 PROCEDURE — 99999 PR PBB SHADOW E&M-EST. PATIENT-LVL III: ICD-10-PCS | Mod: PBBFAC,,, | Performed by: FAMILY MEDICINE

## 2022-03-11 PROCEDURE — 3078F DIAST BP <80 MM HG: CPT | Mod: CPTII,S$GLB,, | Performed by: FAMILY MEDICINE

## 2022-03-11 PROCEDURE — 87591 N.GONORRHOEAE DNA AMP PROB: CPT | Performed by: FAMILY MEDICINE

## 2022-03-11 NOTE — PROGRESS NOTES
Office Visit    Patient Name: Gena Lui    : 1955  MRN: [id number]    Subjective:  Gena is a 66 y.o. female who presents today for:    Gynecologic Exam    67 yo female patient of mine who presents today for annual exam and monitoring of chronic conditions that include hypertension controlled on Lisinopril 10 BID (though generally she only takes her tablet once daily in the morning as she forgets the evening dose), hyperlipidemia with hypertriglyceridemia (Lipitor 80), history of seizures now controlled on Keppra, history of stroke (Lipitor and ASA 81 for secondary prevention), anxiety/depression- Xanax 0.5 PRN, osteoporosis-- improved to osteopenia with treatment of vitamin-D deficiency.      Starting having vaginal discharge on 22- came on for no known reason. Was taking ALIGN probiotic for help with bowel movement regularity and then changed to Culturelle to help with the presumed yeast infection.    Has taken Monistat OTC for 7 days.     Symptoms lasted about 7 days and she was concerned about how long her symptoms were taking to clear and called for appointment.     No odor, no itching or external irritation.  The discharge was generally white in color but at 1 point it did seem green when she was removing the Monistat applicator.    Normal bowel and bladder habits.    Sexually active with same partner for 14 years-- no STD in that time frame.     In  afirm was positive for Candida and BV.  She had a previous Pap that was HPV positive but has cleared and most recent PAPs were normal and with negative HPV so no further cervical cancer screening is indicated.    She initially had some memory concerns leading up to this visit, however, fortunately her boyfriend let her know that the reason she was confused about a recent episode with that she had fallen asleep, so she reports no ongoing concerns.  She has a history of prior stroke but has not had any recent neurologic symptoms, other memory  "deficits, speech slurring or acute dizziness/visual disturbance.  She is taking her medications including ASA 81, lisinopril 10 and Lipitor 80 as prescribed.    No concern for seizure.        PAST MEDICAL HISTORY, SURGICAL/SOCIAL/FAMILY HISTORY REVIEWED AS PER CHART, WITH PERTINENT FINDINGS INCLUDED IN HISTORY SECTION OF NOTE.     Current Medications    Medication List with Changes/Refills   Current Medications    ALPRAZOLAM (XANAX) 0.5 MG TABLET    TAKE 1 TABLET BY MOUTH EVERY DAY AS NEEDED FOR INSOMNIA OR ANXIETY (PANIC ATTACK)    ASPIRIN 81 MG CHEW    Take 81 mg by mouth every evening.     ATORVASTATIN (LIPITOR) 80 MG TABLET    TAKE 1 TABLET BY MOUTH EVERY DAY    BIFIDOBACTERIUM INFANTIS (ALIGN) 4 MG CAP    Take 1 capsule (4 mg total) by mouth daily with breakfast.    ERGOCALCIFEROL (ERGOCALCIFEROL) 50,000 UNIT CAP    TAKE ONE CAPSULE BY MOUTH ONE TIME PER WEEK    LEVETIRACETAM (KEPPRA) 500 MG TAB    TAKE 1 TABLET BY MOUTH TWICE A DAY    LISINOPRIL 10 MG TABLET    Take 1 tablet (10 mg total) by mouth once daily.    MAGNESIUM OXIDE (MAG-OX) 400 MG (241.3 MG MAGNESIUM) TABLET    Take 1 tablet (400 mg total) by mouth after dinner.    MIRTAZAPINE (REMERON) 7.5 MG TAB    Take 1-2 tabs nightly for help with insomnia and appetite stimulation       Allergies   Review of patient's allergies indicates:   Allergen Reactions    Versed [midazolam]      IV Versed altered mental status         Review of Systems (Pertinent positives)  Review of Systems   Gastrointestinal: Negative for constipation and diarrhea.   Genitourinary: Positive for vaginal discharge. Negative for dysuria and vaginal pain.   Neurological: Negative for dizziness, tremors, seizures, weakness and light-headedness.       /67 (BP Location: Right arm, Patient Position: Sitting)   Pulse 82   Ht 4' 11" (1.499 m)   Wt 45.3 kg (99 lb 13.9 oz)   LMP  (LMP Unknown)   SpO2 98%   BMI 20.17 kg/m²     Physical Exam  Vitals reviewed.   Constitutional:       " General: She is not in acute distress.     Appearance: Normal appearance. She is well-developed.   HENT:      Head: Normocephalic and atraumatic.   Eyes:      Conjunctiva/sclera: Conjunctivae normal.   Pulmonary:      Effort: Pulmonary effort is normal.   Genitourinary:     Vagina: Vaginal discharge present. No erythema, tenderness, bleeding, lesions or prolapsed vaginal walls.      Cervix: No cervical motion tenderness, friability, lesion or erythema.      Uterus: Normal.       Adnexa:         Right: No tenderness.          Left: No tenderness.        Comments: Moderate amount of medium thickness yellowish/green discharge.    Musculoskeletal:      Right lower leg: No edema.      Left lower leg: No edema.   Skin:     General: Skin is warm and dry.   Neurological:      General: No focal deficit present.      Mental Status: She is alert and oriented to person, place, and time.   Psychiatric:         Mood and Affect: Mood normal.         Behavior: Behavior normal.           Assessment/Plan:  Gena Lui is a 66 y.o. female who presents today for :        ICD-10-CM ICD-9-CM    1. Acute vaginitis  N76.0 616.10 VAGINOSIS SCREEN BY DNA PROBE      C. trachomatis/N. gonorrhoeae by AMP DNA   2. Benign essential hypertension  I10 401.1    3. History of seizures  Z87.898 V12.49    4. History of stroke  Z86.73 V12.54    5. Long-term use of aspirin therapy  Z79.82 V58.66    6. Encounter for screening mammogram for breast cancer  Z12.31 V76.12 Mammo Digital Screening Bilat     Discharge on exam most likely consistent with bacterial vaginosis which she has previously had.  Also sent for yeast/Trichomonas/GC/CT testing.    Will follow-up with results.    Cervical cancer screening no longer indicated given most recent HPV negative x2.    Other medical conditions well controlled-had a prior memory concern but it since came to light that she had fallen asleep and there were no concerns for stroke or seizure.    Has upcoming  follow-up for monitoring of her chronic conditions in about 4 months with labs prior.  Also placed orders for follow-up mammogram- previous history of abnormal mammogram of the left breast that required follow up.     There are no Patient Instructions on file for this visit.      Follow up in about 4 months (around 7/11/2022) for to follow up on lab results, return as needed for new concerns.

## 2022-03-15 ENCOUNTER — TELEPHONE (OUTPATIENT)
Dept: FAMILY MEDICINE | Facility: CLINIC | Age: 67
End: 2022-03-15
Payer: MEDICARE

## 2022-03-15 DIAGNOSIS — A59.9 TRICHOMONIASIS: Primary | ICD-10-CM

## 2022-03-15 PROBLEM — A59.01 TRICHOMONAL VAGINITIS: Status: ACTIVE | Noted: 2022-03-15

## 2022-03-15 LAB
BACTERIAL VAGINOSIS DNA: NEGATIVE
CANDIDA GLABRATA DNA: NEGATIVE
CANDIDA KRUSEI DNA: NEGATIVE
CANDIDA RRNA VAG QL PROBE: NEGATIVE
T VAGINALIS RRNA GENITAL QL PROBE: POSITIVE

## 2022-03-15 RX ORDER — METRONIDAZOLE 500 MG/1
500 TABLET ORAL 2 TIMES DAILY
Qty: 14 TABLET | Refills: 0 | Status: SHIPPED | OUTPATIENT
Start: 2022-03-15 | End: 2022-04-21

## 2022-03-15 NOTE — TELEPHONE ENCOUNTER
----- Message from Rachelle Flores MD sent at 3/15/2022  3:51 PM CDT -----  Please notify pelvic exam results show Trichomonas. This is an STD infection. I have sent a treatment regimen with Flagyl to the [facility], but any sexual partner(s) also need to be treated for this. Sexual partner should discuss treatment with their doctor, but if any issues obtaining a prescription, she can let me know their demographics and I will send a prescription, thanks.

## 2022-03-15 NOTE — TELEPHONE ENCOUNTER
Called and reviewed information with patient.  She verbalized understanding.  Her partner does not have a PCP, his name is Dirk Glover,  69 (MRN: [id number]).  He will use [facility] as well.  Please advise.

## 2022-03-15 NOTE — TELEPHONE ENCOUNTER
Patient calling with questions/issues in regards to:   patient stated that she had appointment today for pre-op and has some questions regarding this and would like a call back            Please call patient at the following number:  Mobile [phone]     Patient states that it is okay to leave a detailed message.    Patient was informed that the patient would receive a phone call back within 48-72 hours unless this request is STAT.     Prescription sent for partner treatment of Trichomonas per request to [facility]

## 2022-03-17 LAB
C TRACH DNA SPEC QL NAA+PROBE: NOT DETECTED
N GONORRHOEA DNA SPEC QL NAA+PROBE: NOT DETECTED

## 2022-03-30 DIAGNOSIS — I10 BENIGN ESSENTIAL HYPERTENSION: ICD-10-CM

## 2022-03-30 RX ORDER — LISINOPRIL 10 MG/1
10 TABLET ORAL DAILY
Qty: 90 TABLET | Refills: 3 | Status: SHIPPED | OUTPATIENT
Start: 2022-03-30

## 2022-03-30 NOTE — TELEPHONE ENCOUNTER
No new care gaps identified.  Powered by [employer]. Reference number: [id number].   3/30/2022 2:46:16 AM CDT

## 2022-03-31 NOTE — TELEPHONE ENCOUNTER
Refill Routing Note   Medication(s) are not appropriate for processing by [facility] for the following reason(s):      - Medication requested has undergone a recent dosage adjustment (<3 months)    [facility] action(s):  Defer Medication-related problems identified: Dose adjustment        --->Care Gap information included in message below if applicable.   Medication reconciliation completed: No   Automatic Epic Generated Protocol Data:        Requested Prescriptions   Pending Prescriptions Disp Refills    lisinopriL 10 MG tablet [Pharmacy Med Name: LISINOPRIL 10 MG TABLET] 90 tablet 3     Sig: Take 1 tablet (10 mg total) by mouth once daily.       Cardiovascular:  ACE Inhibitors Failed - 3/30/2022  2:45 AM        Failed - Matches previous order       Previous Authorizing Provider: Rachelle Flores MD (lisinopriL 10 MG tablet)  Previous Pharmacy: [id number] - [place], [place] - [address]            Passed - Patient is at least 18 years old        Passed - Last BP in normal range within 360 days     BP Readings from Last 3 Encounters:   03/11/22 100/67   01/31/22 110/73   10/27/21 114/78               Passed - Valid encounter within last 15 months     Recent Visits  Date Type Provider Dept   03/11/22 Office Visit Rachelle Flores MD [facility]   01/31/22 Office Visit Rachelle Flores MD [facility]   10/27/21 Office Visit Rachelle Flores MD [facility]   08/23/21 Office Visit Rachelle Flores MD [facility]   06/16/21 Office Visit Rachelle Flores MD [facility]   04/21/21 Office Visit Rachelle Flores MD [facility]   02/22/21 Office Visit Rachelle Flores MD [facility]   06/16/20 Office Visit Rachelle Flores MD [facility]   Showing recent visits within past 720 days and meeting all other requirements  Future Appointments  No visits were found meeting these conditions.  Showing future  appointments within next 150 days and meeting all other requirements      Future Appointments              In 3 months APPOINTMENT [facility] [place] [facility] [place], [facility]    In 4 months MD [facility] - [facility], [facility]                Passed - No ED/Hospital visits since last PCP visit     Last PCP Visit: 3/11/2022 Last Admission: 6/28/2021 Last ED Visit: 1/27/2019          Passed - Cr is 1.39 or below and within 360 days     Lab Results   Component Value Date    CREATININE 0.8 01/27/2022    CREATININE 0.8 09/13/2021    CREATININE 0.8 02/22/2021    POCCRE 0.8 01/27/2019              Passed - K is 5.2 or below and within 360 days     POC Potassium   Date Value Ref Range Status   01/27/2019 4.1 3.5 - 5.1 mmol/L Final     Potassium   Date Value Ref Range Status   01/27/2022 3.7 3.5 - 5.1 mmol/L Final   09/13/2021 4.3 3.5 - 5.1 mmol/L Final   02/22/2021 3.7 3.5 - 5.1 mmol/L Final              Passed - eGFR within 360 days     Lab Results   Component Value Date    EGFRNONAA >60 01/27/2022    EGFRNONAA >60 09/13/2021    EGFRNONAA >60 02/22/2021                      Appointments  past 12m or future 3m with PCP    Date Provider   Last Visit   3/11/2022 Rachelle Flores MD   Next Visit   8/1/2022 Rachelle Flores MD   ED visits in past 90 days: 0        Note composed:7:22 PM 03/30/2022

## 2022-04-12 DIAGNOSIS — F32.A ANXIETY AND DEPRESSION: ICD-10-CM

## 2022-04-12 DIAGNOSIS — F41.9 ANXIETY AND DEPRESSION: ICD-10-CM

## 2022-04-12 RX ORDER — ALPRAZOLAM 0.5 MG/1
TABLET ORAL
Qty: 30 TABLET | Refills: 2 | Status: SHIPPED | OUTPATIENT
Start: 2022-04-12 | End: 2022-11-15

## 2022-04-12 NOTE — TELEPHONE ENCOUNTER
No new care gaps identified.  Powered by [employer]. Reference number: [id number].   4/12/2022 11:15:14 AM CDT

## 2022-04-20 ENCOUNTER — HOSPITAL ENCOUNTER (OUTPATIENT)
Dept: RADIOLOGY | Facility: HOSPITAL | Age: 67
Discharge: HOME | End: 2022-04-20
Attending: FAMILY MEDICINE
Payer: MEDICARE

## 2022-04-20 ENCOUNTER — OFFICE VISIT (OUTPATIENT)
Dept: FAMILY MEDICINE | Facility: CLINIC | Age: 67
End: 2022-04-20
Attending: FAMILY MEDICINE
Payer: MEDICARE

## 2022-04-20 VITALS
HEIGHT: 59 IN | WEIGHT: 96.31 LBS | BODY MASS INDEX: 19.42 KG/M2 | SYSTOLIC BLOOD PRESSURE: 102 MMHG | OXYGEN SATURATION: 97 % | HEART RATE: 113 BPM | DIASTOLIC BLOOD PRESSURE: 62 MMHG

## 2022-04-20 DIAGNOSIS — R51.9 FACE PAIN: ICD-10-CM

## 2022-04-20 DIAGNOSIS — I88.9 CERVICAL ADENITIS: ICD-10-CM

## 2022-04-20 DIAGNOSIS — R22.0 MASS OF FACE: ICD-10-CM

## 2022-04-20 DIAGNOSIS — R22.0 LEFT FACIAL SWELLING: Primary | ICD-10-CM

## 2022-04-20 DIAGNOSIS — R22.0 LEFT FACIAL SWELLING: ICD-10-CM

## 2022-04-20 PROCEDURE — 3008F PR BODY MASS INDEX (BMI) DOCUMENTED: ICD-10-PCS | Mod: CPTII,S$GLB,, | Performed by: FAMILY MEDICINE

## 2022-04-20 PROCEDURE — 1160F PR REVIEW ALL MEDS BY PRESCRIBER/CLIN PHARMACIST DOCUMENTED: ICD-10-PCS | Mod: CPTII,S$GLB,, | Performed by: FAMILY MEDICINE

## 2022-04-20 PROCEDURE — 1101F PT FALLS ASSESS-DOCD LE1/YR: CPT | Mod: CPTII,S$GLB,, | Performed by: FAMILY MEDICINE

## 2022-04-20 PROCEDURE — 3074F SYST BP LT 130 MM HG: CPT | Mod: CPTII,S$GLB,, | Performed by: FAMILY MEDICINE

## 2022-04-20 PROCEDURE — 99999 PR PBB SHADOW E&M-EST. PATIENT-LVL IV: ICD-10-PCS | Mod: PBBFAC,,, | Performed by: FAMILY MEDICINE

## 2022-04-20 PROCEDURE — 70543 MRI ORBT/FAC/NCK W/O &W/DYE: CPT | Mod: TC

## 2022-04-20 PROCEDURE — 25500020 PHARM REV CODE 255: Performed by: FAMILY MEDICINE

## 2022-04-20 PROCEDURE — 99214 OFFICE O/P EST MOD 30 MIN: CPT | Mod: S$GLB,,, | Performed by: FAMILY MEDICINE

## 2022-04-20 PROCEDURE — 1160F RVW MEDS BY RX/DR IN RCRD: CPT | Mod: CPTII,S$GLB,, | Performed by: FAMILY MEDICINE

## 2022-04-20 PROCEDURE — 99214 PR OFFICE/OUTPT VISIT, EST, LEVL IV, 30-39 MIN: ICD-10-PCS | Mod: S$GLB,,, | Performed by: FAMILY MEDICINE

## 2022-04-20 PROCEDURE — 70543 MRI ORBT/FAC/NCK W/O &W/DYE: CPT | Mod: 26,,, | Performed by: RADIOLOGY

## 2022-04-20 PROCEDURE — 1126F AMNT PAIN NOTED NONE PRSNT: CPT | Mod: CPTII,S$GLB,, | Performed by: FAMILY MEDICINE

## 2022-04-20 PROCEDURE — 1101F PR PT FALLS ASSESS DOC 0-1 FALLS W/OUT INJ PAST YR: ICD-10-PCS | Mod: CPTII,S$GLB,, | Performed by: FAMILY MEDICINE

## 2022-04-20 PROCEDURE — 3074F PR MOST RECENT SYSTOLIC BLOOD PRESSURE < 130 MM HG: ICD-10-PCS | Mod: CPTII,S$GLB,, | Performed by: FAMILY MEDICINE

## 2022-04-20 PROCEDURE — 1159F MED LIST DOCD IN RCRD: CPT | Mod: CPTII,S$GLB,, | Performed by: FAMILY MEDICINE

## 2022-04-20 PROCEDURE — 70543 MRI MAXILLOFACIAL W W/O CONTRAST: ICD-10-PCS | Mod: 26,,, | Performed by: RADIOLOGY

## 2022-04-20 PROCEDURE — 3078F DIAST BP <80 MM HG: CPT | Mod: CPTII,S$GLB,, | Performed by: FAMILY MEDICINE

## 2022-04-20 PROCEDURE — 99999 PR PBB SHADOW E&M-EST. PATIENT-LVL IV: CPT | Mod: PBBFAC,,, | Performed by: FAMILY MEDICINE

## 2022-04-20 PROCEDURE — 3008F BODY MASS INDEX DOCD: CPT | Mod: CPTII,S$GLB,, | Performed by: FAMILY MEDICINE

## 2022-04-20 PROCEDURE — 4010F ACE/ARB THERAPY RXD/TAKEN: CPT | Mod: CPTII,S$GLB,, | Performed by: FAMILY MEDICINE

## 2022-04-20 PROCEDURE — A9585 GADOBUTROL INJECTION: HCPCS | Performed by: FAMILY MEDICINE

## 2022-04-20 PROCEDURE — 1126F PR PAIN SEVERITY QUANTIFIED, NO PAIN PRESENT: ICD-10-PCS | Mod: CPTII,S$GLB,, | Performed by: FAMILY MEDICINE

## 2022-04-20 PROCEDURE — 3288F FALL RISK ASSESSMENT DOCD: CPT | Mod: CPTII,S$GLB,, | Performed by: FAMILY MEDICINE

## 2022-04-20 PROCEDURE — 1159F PR MEDICATION LIST DOCUMENTED IN MEDICAL RECORD: ICD-10-PCS | Mod: CPTII,S$GLB,, | Performed by: FAMILY MEDICINE

## 2022-04-20 PROCEDURE — 4010F PR ACE/ARB THEARPY RXD/TAKEN: ICD-10-PCS | Mod: CPTII,S$GLB,, | Performed by: FAMILY MEDICINE

## 2022-04-20 PROCEDURE — 3078F PR MOST RECENT DIASTOLIC BLOOD PRESSURE < 80 MM HG: ICD-10-PCS | Mod: CPTII,S$GLB,, | Performed by: FAMILY MEDICINE

## 2022-04-20 PROCEDURE — 3288F PR FALLS RISK ASSESSMENT DOCUMENTED: ICD-10-PCS | Mod: CPTII,S$GLB,, | Performed by: FAMILY MEDICINE

## 2022-04-20 RX ORDER — GADOBUTROL 604.72 MG/ML
5 INJECTION INTRAVENOUS
Status: COMPLETED | OUTPATIENT
Start: 2022-04-20 | End: 2022-04-20

## 2022-04-20 RX ORDER — AMOXICILLIN 500 MG/1
500 CAPSULE ORAL 3 TIMES DAILY
Qty: 30 CAPSULE | Refills: 0 | Status: SHIPPED | OUTPATIENT
Start: 2022-04-20 | End: 2022-07-01 | Stop reason: CLARIF

## 2022-04-20 RX ADMIN — GADOBUTROL 5 ML: 604.72 INJECTION INTRAVENOUS at 05:04

## 2022-04-20 NOTE — PROGRESS NOTES
Subjective:       Patient ID: Gena Lui is a 66 y.o. female.    Chief Complaint: Mouth Swelling    66 yr old pleasant white female with anxiety, HTN, former smoker but current vaping, presents today for evaluation of left facial swelling x 1 week. She went to dentist and has been having issues with dentures. They did imaging and it was fine. She still has swelling and pain in her face. She has family history of Hodgkin's. She is worried about it. Never had cancer. Details as follows -    Mass  This is a new problem. The current episode started 1 to 4 weeks ago. The problem occurs constantly. The problem has been gradually worsening. Pertinent negatives include no arthralgias, chest pain, congestion, diaphoresis, headaches, myalgias, nausea or sore throat. Nothing aggravates the symptoms. She has tried nothing for the symptoms. The treatment provided no relief.     Review of Systems   Constitutional: Negative.  Negative for activity change, diaphoresis and unexpected weight change.   HENT: Positive for facial swelling. Negative for nasal congestion, ear discharge, hearing loss, rhinorrhea, sore throat and voice change.    Eyes: Negative.  Negative for pain, discharge and visual disturbance.   Respiratory: Negative.  Negative for chest tightness, shortness of breath and wheezing.    Cardiovascular: Negative.  Negative for chest pain.   Gastrointestinal: Negative.  Negative for abdominal distention, anal bleeding, constipation and nausea.   Endocrine: Negative.  Negative for cold intolerance, polydipsia and polyuria.   Genitourinary: Negative.  Negative for decreased urine volume, difficulty urinating, dysuria, frequency, menstrual problem and vaginal pain.   Musculoskeletal: Negative.  Negative for arthralgias, gait problem and myalgias.   Integumentary:  Negative for color change, pallor and wound. Negative.   Allergic/Immunologic: Negative.  Negative for environmental allergies and immunocompromised state.    Neurological: Negative.  Negative for dizziness, tremors, seizures, speech difficulty and headaches.   Hematological: Negative.  Negative for adenopathy. Does not bruise/bleed easily.   Psychiatric/Behavioral: Negative.  Negative for agitation, confusion, decreased concentration, hallucinations, self-injury and suicidal ideas. The patient is not nervous/anxious.          Active Ambulatory Problems     Diagnosis Date Noted    Anxiety and depression 03/21/2018    Benign essential hypertension 03/21/2018    Mixed hyperlipidemia 03/21/2018    History of stroke 03/21/2018    Chronic bilateral low back pain without sciatica 03/21/2018    Long-term use of aspirin therapy 03/21/2018    Former smoker 03/21/2018    History of seizures 03/21/2018    Postmenopausal osteoporosis 03/24/2018    Vitamin D deficiency 03/24/2018    History of alcohol abuse 10/05/2018    H/O umbilical hernia repair 02/08/2019    Abnormal TSH 04/05/2019    History of squamous cell carcinoma of skin 02/22/2021    Trichomonal vaginitis 03/15/2022     Resolved Ambulatory Problems     Diagnosis Date Noted    Suicidal thoughts 03/21/2018    Papanicolaou smear of cervix with positive high risk human papilloma virus (HPV) test 04/16/2019    Protein-calorie malnutrition, mild 02/22/2021    Major depressive disorder, recurrent, mild 02/22/2021    Incisional hernia, without obstruction or gangrene 06/28/2021     Past Medical History:   Diagnosis Date    Memory loss     Seizures     Tobacco abuse 3/21/2018     Past Surgical History:   Procedure Laterality Date    ANTERIOR VAGINAL REPAIR      CATARACT EXTRACTION Right 10/16/2007    Dr. Ramsey //yag 5/1/18 Dr. Bentley    CATARACT EXTRACTION W/  INTRAOCULAR LENS IMPLANT Left 10/30/2007    Dr. Ramsey// yag 5/1/18 Dr. Bentley    CHOLECYSTECTOMY      COLONOSCOPY N/A 4/19/2018    Procedure: COLONOSCOPY;  Surgeon: Yi Goncalves MD;  Location: [facility];  Service: Endoscopy;  Laterality:  N/A;    EYE SURGERY      ROBOT-ASSISTED LAPAROSCOPIC REPAIR OF VENTRAL HERNIA N/A 2021    Procedure: ROBOTIC REPAIR, HERNIA, VENTRAL;  Surgeon: Alli Dumont Jr., MD;  Location: [facility];  Service: General;  Laterality: N/A;    TONSILLECTOMY      UMBILICAL HERNIA REPAIR N/A 2019    Procedure: REPAIR, HERNIA, UMBILICAL, AGE 5 YEARS OR OLDER, open with/without mesh;  Surgeon: Magno Mishra MD;  Location: [address];  Service: General;  Laterality: N/A;     Family History   Problem Relation Age of Onset    Blindness Neg Hx     Glaucoma Neg Hx     Macular degeneration Neg Hx     Retinal detachment Neg Hx      Social History     Socioeconomic History    Marital status:    Tobacco Use    Smoking status: Light Tobacco Smoker     Types: Cigarettes     Last attempt to quit: 2019     Years since quittin.9    Smokeless tobacco: Never Used    Tobacco comment: started smoking again in 2020(when nervous/bored)   Substance and Sexual Activity    Alcohol use: Yes     Comment: 1-2 drinks daily    Drug use: Yes     Types: Marijuana     Comment: intermittent to take the edge off of her anxiety    Sexual activity: Never     Review of patient's allergies indicates:   Allergen Reactions    Versed [midazolam]      IV Versed altered mental status     Current Outpatient Medications on File Prior to Visit   Medication Sig Dispense Refill    ALPRAZolam (XANAX) 0.5 MG tablet TAKE 1 TABLET BY MOUTH EVERY DAY AS NEEDED FOR INSOMNIA OR ANXIETY (PANIC ATTACK) 30 tablet 2    aspirin 81 MG Chew Take 81 mg by mouth every evening.       atorvastatin (LIPITOR) 80 MG tablet TAKE 1 TABLET BY MOUTH EVERY DAY 90 tablet 3    Bifidobacterium infantis (ALIGN) 4 mg Cap Take 1 capsule (4 mg total) by mouth daily with breakfast. 90 capsule 3    ergocalciferol (ERGOCALCIFEROL) 50,000 unit Cap TAKE ONE CAPSULE BY MOUTH ONE TIME PER WEEK 12 capsule 6    levETIRAcetam (KEPPRA) 500 MG Tab TAKE 1 TABLET  BY MOUTH TWICE A  tablet 4    lisinopriL 10 MG tablet Take 1 tablet (10 mg total) by mouth once daily. 90 tablet 3    magnesium oxide (MAG-OX) 400 mg (241.3 mg magnesium) tablet Take 1 tablet (400 mg total) by mouth after dinner. 90 tablet 4    metroNIDAZOLE (FLAGYL) 500 MG tablet Take 1 tablet (500 mg total) by mouth 2 (two) times a day. 14 tablet 0    mirtazapine (REMERON) 7.5 MG Tab Take 1-2 tabs nightly for help with insomnia and appetite stimulation 30 tablet 11     No current facility-administered medications on file prior to visit.       Objective:       Vitals:    04/20/22 1333   BP: 102/62   Pulse: (!) 113       Physical Exam  Constitutional:       General: She is not in acute distress.     Appearance: She is well-developed. She is not diaphoretic.   HENT:      Head: Normocephalic and atraumatic.        Right Ear: External ear normal.      Left Ear: External ear normal.      Nose: Nose normal.      Mouth/Throat:      Pharynx: No oropharyngeal exudate.   Eyes:      General: No scleral icterus.        Right eye: No discharge.         Left eye: No discharge.      Conjunctiva/sclera: Conjunctivae normal.      Pupils: Pupils are equal, round, and reactive to light.   Neck:      Thyroid: No thyromegaly.      Vascular: No JVD.      Trachea: No tracheal deviation.   Cardiovascular:      Rate and Rhythm: Normal rate and regular rhythm.      Heart sounds: Normal heart sounds. No murmur heard.    No friction rub. No gallop.   Pulmonary:      Effort: Pulmonary effort is normal.      Breath sounds: Normal breath sounds. No stridor. No wheezing or rales.   Chest:      Chest wall: No tenderness.   Abdominal:      General: Bowel sounds are normal. There is no distension.      Palpations: Abdomen is soft. There is no mass.      Tenderness: There is no abdominal tenderness. There is no guarding or rebound.      Hernia: No hernia is present.   Musculoskeletal:         General: No tenderness. Normal range of motion.       Cervical back: Normal range of motion and neck supple.   Lymphadenopathy:      Cervical: No cervical adenopathy.   Skin:     General: Skin is warm and dry.      Coloration: Skin is not pale.      Findings: No erythema or rash.   Neurological:      Mental Status: She is alert and oriented to person, place, and time.      Cranial Nerves: No cranial nerve deficit.      Motor: No abnormal muscle tone.      Coordination: Coordination normal.      Deep Tendon Reflexes: Reflexes are normal and symmetric. Reflexes normal.   Psychiatric:         Behavior: Behavior normal.         Thought Content: Thought content normal.         Judgment: Judgment normal.         Assessment:       Problem List Items Addressed This Visit    None     Visit Diagnoses     Left facial swelling    -  Primary    Relevant Medications    amoxicillin (AMOXIL) 500 MG capsule    Other Relevant Orders    Ambulatory referral/consult to ENT    MRI Maxillofacial With Contrast    Cervical adenitis        Relevant Medications    amoxicillin (AMOXIL) 500 MG capsule    Other Relevant Orders    Ambulatory referral/consult to ENT    MRI Maxillofacial With Contrast    Mass of face        Relevant Medications    amoxicillin (AMOXIL) 500 MG capsule    Other Relevant Orders    Ambulatory referral/consult to ENT    MRI Maxillofacial With Contrast    Face pain        Relevant Orders    MRI Maxillofacial With Contrast          Plan:       Gena was seen today for mouth swelling.    Diagnoses and all orders for this visit:    Left facial swelling  -     amoxicillin (AMOXIL) 500 MG capsule; Take 1 capsule (500 mg total) by mouth 3 (three) times daily.  -     Ambulatory referral/consult to ENT; Future  -     MRI Maxillofacial With Contrast; Future    Cervical adenitis  -     amoxicillin (AMOXIL) 500 MG capsule; Take 1 capsule (500 mg total) by mouth 3 (three) times daily.  -     Ambulatory referral/consult to ENT; Future  -     MRI Maxillofacial With Contrast;  Future    Mass of face  -     amoxicillin (AMOXIL) 500 MG capsule; Take 1 capsule (500 mg total) by mouth 3 (three) times daily.  -     Ambulatory referral/consult to ENT; Future  -     MRI Maxillofacial With Contrast; Future    Face pain  -     MRI Maxillofacial With Contrast; Future      Left facial swelling/cervical adenitis/mass  -r/o tumor vs CA vs infection  -amoxil x 10 days  -refer ENT  -MRi face    Spent adequate time in obtaining history and explaining differentials    25 minutes spent during this visit of which greater than 50% devoted to face-face counseling and coordination of care regarding diagnosis and management plan    Follow up if symptoms worsen or fail to improve.

## 2022-04-21 ENCOUNTER — HOSPITAL ENCOUNTER (EMERGENCY)
Facility: HOSPITAL | Age: 67
Discharge: HOME | End: 2022-04-21
Attending: EMERGENCY MEDICINE
Payer: MEDICARE

## 2022-04-21 ENCOUNTER — TELEPHONE (OUTPATIENT)
Dept: FAMILY MEDICINE | Facility: CLINIC | Age: 67
End: 2022-04-21
Payer: MEDICARE

## 2022-04-21 ENCOUNTER — NURSE TRIAGE (OUTPATIENT)
Dept: ADMINISTRATIVE | Facility: CLINIC | Age: 67
End: 2022-04-21
Payer: MEDICARE

## 2022-04-21 VITALS
HEIGHT: 59 IN | BODY MASS INDEX: 19.35 KG/M2 | HEART RATE: 105 BPM | TEMPERATURE: 99 F | DIASTOLIC BLOOD PRESSURE: 69 MMHG | RESPIRATION RATE: 17 BRPM | SYSTOLIC BLOOD PRESSURE: 127 MMHG | OXYGEN SATURATION: 99 % | WEIGHT: 96 LBS

## 2022-04-21 DIAGNOSIS — R60.9 PAROTID SWELLING: Primary | ICD-10-CM

## 2022-04-21 LAB
ANION GAP SERPL CALC-SCNC: 15 MMOL/L (ref 8–16)
BASOPHILS # BLD AUTO: 0.05 K/UL (ref 0–0.2)
BASOPHILS NFR BLD: 0.4 % (ref 0–1.9)
BUN SERPL-MCNC: 9 MG/DL (ref 8–23)
CALCIUM SERPL-MCNC: 9.5 MG/DL (ref 8.7–10.5)
CHLORIDE SERPL-SCNC: 101 MMOL/L (ref 95–110)
CO2 SERPL-SCNC: 23 MMOL/L (ref 23–29)
CREAT SERPL-MCNC: 0.7 MG/DL (ref 0.5–1.4)
DIFFERENTIAL METHOD: ABNORMAL
EOSINOPHIL # BLD AUTO: 0 K/UL (ref 0–0.5)
EOSINOPHIL NFR BLD: 0.2 % (ref 0–8)
ERYTHROCYTE [DISTWIDTH] IN BLOOD BY AUTOMATED COUNT: 13.5 % (ref 11.5–14.5)
EST. GFR  (AFRICAN AMERICAN): >60 ML/MIN/1.73 M^2
EST. GFR  (NON AFRICAN AMERICAN): >60 ML/MIN/1.73 M^2
GLUCOSE SERPL-MCNC: 97 MG/DL (ref 70–110)
HCT VFR BLD AUTO: 39.6 % (ref 37–48.5)
HGB BLD-MCNC: 13.8 G/DL (ref 12–16)
IMM GRANULOCYTES # BLD AUTO: 0.09 K/UL (ref 0–0.04)
IMM GRANULOCYTES NFR BLD AUTO: 0.7 % (ref 0–0.5)
LYMPHOCYTES # BLD AUTO: 1.3 K/UL (ref 1–4.8)
LYMPHOCYTES NFR BLD: 10.6 % (ref 18–48)
MCH RBC QN AUTO: 32.1 PG (ref 27–31)
MCHC RBC AUTO-ENTMCNC: 34.8 G/DL (ref 32–36)
MCV RBC AUTO: 92 FL (ref 82–98)
MONOCYTES # BLD AUTO: 0.8 K/UL (ref 0.3–1)
MONOCYTES NFR BLD: 6.6 % (ref 4–15)
NEUTROPHILS # BLD AUTO: 10.1 K/UL (ref 1.8–7.7)
NEUTROPHILS NFR BLD: 81.5 % (ref 38–73)
NRBC BLD-RTO: 0 /100 WBC
PLATELET # BLD AUTO: 201 K/UL (ref 150–450)
PMV BLD AUTO: 10.3 FL (ref 9.2–12.9)
POTASSIUM SERPL-SCNC: 4.1 MMOL/L (ref 3.5–5.1)
RBC # BLD AUTO: 4.3 M/UL (ref 4–5.4)
SODIUM SERPL-SCNC: 139 MMOL/L (ref 136–145)
WBC # BLD AUTO: 12.34 K/UL (ref 3.9–12.7)

## 2022-04-21 PROCEDURE — 96374 THER/PROPH/DIAG INJ IV PUSH: CPT

## 2022-04-21 PROCEDURE — 96375 TX/PRO/DX INJ NEW DRUG ADDON: CPT

## 2022-04-21 PROCEDURE — 25000003 PHARM REV CODE 250: Performed by: EMERGENCY MEDICINE

## 2022-04-21 PROCEDURE — 85025 COMPLETE CBC W/AUTO DIFF WBC: CPT | Performed by: NURSE PRACTITIONER

## 2022-04-21 PROCEDURE — 63600175 PHARM REV CODE 636 W HCPCS: Performed by: EMERGENCY MEDICINE

## 2022-04-21 PROCEDURE — 80048 BASIC METABOLIC PNL TOTAL CA: CPT | Performed by: NURSE PRACTITIONER

## 2022-04-21 PROCEDURE — 99284 EMERGENCY DEPT VISIT MOD MDM: CPT | Mod: 25

## 2022-04-21 RX ORDER — KETOROLAC TROMETHAMINE 30 MG/ML
15 INJECTION, SOLUTION INTRAMUSCULAR; INTRAVENOUS
Status: COMPLETED | OUTPATIENT
Start: 2022-04-21 | End: 2022-04-21

## 2022-04-21 RX ORDER — CLINDAMYCIN PHOSPHATE 900 MG/50ML
900 INJECTION, SOLUTION INTRAVENOUS
Status: COMPLETED | OUTPATIENT
Start: 2022-04-21 | End: 2022-04-21

## 2022-04-21 RX ADMIN — KETOROLAC TROMETHAMINE 15 MG: 30 INJECTION, SOLUTION INTRAMUSCULAR at 11:04

## 2022-04-21 RX ADMIN — CLINDAMYCIN IN 5 PERCENT DEXTROSE 900 MG: 18 INJECTION, SOLUTION INTRAVENOUS at 11:04

## 2022-04-21 NOTE — ED NOTES
"Patient presents to ER ambulatory with daughter  with chief complaints of pain/swelling in left side of face and jaw area x3 days. Patient states, "it only hurts if someone touches it."   "

## 2022-04-21 NOTE — TELEPHONE ENCOUNTER
Left side of face is swollen going down to the ear and jaw bone. She recently went to dentist to have dentures readjusted. Pt stated she has some swelling on the left side by the ear. Pt stated she feel like a pill is stuck in her throat. Pt stated she is scared and have anxiety. Pt stated she is not having difficulty breathing but feels she is  breathing heavy and it may be because her anxiety. and she says sounds hoarse. Pt stated she got up in the middle of the night to drink water and when she went to swallow she did feel something in her throat. She don't know what it is. She said she tried to take her medication and she took a small one and it also felt like it didn't want to go down. She's been drinking water and coughing she said it feels like it stuck on the left side of her throat. Care advice recommend pt go to Er. Pt verbalized understanding.     Reason for Disposition   Symptoms of FB stuck in throat or esophagus (e.g., continued pain in throat or chest, FB sensation, blood-tinged saliva) (Exception: pill stuck)    Additional Information   Negative: Any difficulty breathing (e.g., coughing, wheezing or stridor)   Negative: Sounds like a life-threatening emergency to the triager   Negative: Symptoms of blocked esophagus (e.g., can't swallow normal secretions, drooling)    Protocols used: SWALLOWED FOREIGN BODY-A-AH

## 2022-04-21 NOTE — Clinical Note
"Gena"Gena" Lui was seen and treated in our emergency department on 4/21/2022.  She may return to work on 04/25/2022.       If you have any questions or concerns, please don't hesitate to call.      Netta Patel RN RN    "

## 2022-04-21 NOTE — FIRST PROVIDER EVALUATION
" Emergency Department TeleTriage Encounter Note      CHIEF COMPLAINT    Chief Complaint   Patient presents with    Facial Swelling     C/o facial swelling to left side of face started a few days ago, reports was seen by dentist and has had xray and MRI for same currently awaiting results, reports now feeling like the swelling is in her neck and having difficulty swallowing her pills this morning, reports currently on amoxicillin but reports it "feels like it's stuck", no drooling noted, able to tolerate PO, no muffled voice       VITAL SIGNS   Initial Vitals [04/21/22 0930]   BP Pulse Resp Temp SpO2   127/69 105 17 98.8 °F (37.1 °C) 99 %      MAP       --            ALLERGIES    Review of patient's allergies indicates:   Allergen Reactions    Versed [midazolam]      IV Versed altered mental status       PROVIDER TRIAGE NOTE  This is a teletriage evaluation of a 66 y.o. female presenting to the ED complaining of left sided facial swelling for a few days.  THis morning, throat feels tight and is having difficulty swallowing medications.    Voice normal. Managing secretions without difficulty.     Initial orders will be placed and care will be transferred to an alternate provider when patient is roomed for a full evaluation. Any additional orders and the final disposition will be determined by that provider.           ORDERS  Labs Reviewed - No data to display    ED Orders (720h ago, onward)    Start Ordered     Status Ordering Provider    04/21/22 0937 04/21/22 0936  CBC auto differential  STAT         Ordered SASKIA WASSERMAN    04/21/22 0937 04/21/22 0936  Basic metabolic panel  STAT         Ordered SASKIA WASSERMAN    04/21/22 0937 04/21/22 0936  Insert Saline lock IV  Once         Ordered SASKIA WASSERMAN            Virtual Visit Note: The provider triage portion of this emergency department evaluation and documentation was performed via [employer], a HIPAA-compliant telemedicine " application, in concert with a tele-presenter in the room. A face to face patient evaluation with one of my colleagues will occur once the patient is placed in an emergency department room.      DISCLAIMER: This note was prepared with [employer] voice recognition transcription software. Garbled syntax, mangled pronouns, and other bizarre constructions may be attributed to that software system.

## 2022-04-21 NOTE — TELEPHONE ENCOUNTER
----- Message from Greta Bingham sent at 4/21/2022  8:14 AM CDT -----  Contact: [phone]  Type:  Same Day Appointment Request    Caller is requesting a same day appointment.  Caller declined first available appointment listed below.    Name of Caller:Pt  When is the first available appointment?n/a  Symptoms: pt took her medication this morning and it feels like it is stuck in her Throat and does not want to take the rest of her medication due to this. Pt is not sure if it is the medication she is feeling in her throat or if it because her throat is swollen  Best Call Back Number:[phone]

## 2022-04-21 NOTE — TELEPHONE ENCOUNTER
Called patient to inform her that we do not have any same day appointments available. Patient was inform to try urgent care or ER. Patient daughter said that they are in the ER right now and that she will get a referral to ENT and some medications

## 2022-04-21 NOTE — Clinical Note
"Gena"Gena" Lui was seen and treated in our emergency department on 4/21/2022.  She may return to work on 04/25/2022.       If you have any questions or concerns, please don't hesitate to call.      Netta Patel RN LPN    "

## 2022-04-21 NOTE — PHARMACY MED REC
"  [facility]           Pharmacy  Admission Medication History     The home medication history was taken by Nikki Glover.      Medication history obtained from Medications listed below were obtained from: Medications brought from home    Based on information gathered for medication list, you may go to "Admission" then "Reconcile Home Medications" tabs to review and/or act upon those items.      The home medication list has been updated by the Pharmacy department.    Please read ALL comments highlighted in yellow.    Please address this information as you see fit.     Feel free to contact us if you have any questions or require assistance.    The medications listed below were removed from the home medication list.  Please reorder if appropriate:     Patient reports NOT TAKING the following medication(s):  o Flagyl 500mg    Potential issues to be addressed PRIOR TO DISCHARGE  Patient reported not taking the following medications: (remeron 7.5mg). These medications remain on the home medication list. Please address accordingly.     Current Facility-Administered Medications on File Prior to Encounter   Medication Dose Route Frequency Provider Last Rate Last Admin    [COMPLETED] gadobutroL (GADAVIST) injection 5 mL  5 mL Intravenous ONCE PRN Alejandro Greenfield MD   5 mL at 04/20/22 1701     Current Outpatient Medications on File Prior to Encounter   Medication Sig Dispense Refill    ALPRAZolam (XANAX) 0.5 MG tablet TAKE 1 TABLET BY MOUTH EVERY DAY AS NEEDED FOR INSOMNIA OR ANXIETY (PANIC ATTACK) (Patient taking differently: Take 0.5 mg by mouth daily as needed for Insomnia or Anxiety (or panic attack).) 30 tablet 2    amoxicillin (AMOXIL) 500 MG capsule Take 1 capsule (500 mg total) by mouth 3 (three) times daily. 30 capsule 0    aspirin 81 MG Chew Take 81 mg by mouth every evening.       atorvastatin (LIPITOR) 80 MG tablet TAKE 1 TABLET BY MOUTH EVERY DAY (Patient taking differently: Take 80 " mg by mouth once daily.) 90 tablet 3    ergocalciferol (ERGOCALCIFEROL) 50,000 unit Cap TAKE ONE CAPSULE BY MOUTH ONE TIME PER WEEK (Patient taking differently: Take 50,000 Units by mouth every Sunday.) 12 capsule 6    Lactobacillus rhamnosus GG (CULTURELLE) 10 billion cell capsule Take 1 capsule by mouth once daily.      levETIRAcetam (KEPPRA) 500 MG Tab TAKE 1 TABLET BY MOUTH TWICE A DAY (Patient taking differently: Take 500 mg by mouth 2 (two) times daily. DO NOT CRUSH OR CHEW; SWALLOW WHOLE.) 180 tablet 4    lisinopriL 10 MG tablet Take 1 tablet (10 mg total) by mouth once daily. 90 tablet 3    magnesium oxide (MAG-OX) 400 mg (241.3 mg magnesium) tablet Take 1 tablet (400 mg total) by mouth after dinner. 90 tablet 4    multivit-min/iron/folic/lutein (MULTIVITAMIN WOMEN 50 PLUS ORAL) Take 1 tablet by mouth once daily.      mirtazapine (REMERON) 7.5 MG Tab Take 1-2 tabs nightly for help with insomnia and appetite stimulation (Patient not taking: Reported on 4/21/2022) 30 tablet 11    sars-cov-2, covid-19, (MODERNA COVID-19) 100 mcg/0.5 ml injection       [DISCONTINUED] Bifidobacterium infantis (ALIGN) 4 mg Cap Take 1 capsule (4 mg total) by mouth daily with breakfast. 90 capsule 3    [DISCONTINUED] metroNIDAZOLE (FLAGYL) 500 MG tablet Take 1 tablet (500 mg total) by mouth 2 (two) times a day. 14 tablet 0       Please address this information as you see fit.  Feel free to contact us if you have any questions or require assistance.    Nikki Glover  [phone]                .

## 2022-04-22 ENCOUNTER — PATIENT MESSAGE (OUTPATIENT)
Dept: FAMILY MEDICINE | Facility: CLINIC | Age: 67
End: 2022-04-22
Payer: MEDICARE

## 2022-04-22 ENCOUNTER — TELEPHONE (OUTPATIENT)
Dept: FAMILY MEDICINE | Facility: CLINIC | Age: 67
End: 2022-04-22
Payer: MEDICARE

## 2022-04-22 DIAGNOSIS — K11.21 ACUTE PAROTITIS: Primary | ICD-10-CM

## 2022-04-22 DIAGNOSIS — R22.0 LEFT FACIAL SWELLING: ICD-10-CM

## 2022-04-22 RX ORDER — TRAMADOL HYDROCHLORIDE 50 MG/1
50 TABLET ORAL EVERY 8 HOURS PRN
Qty: 12 TABLET | Refills: 0 | Status: SHIPPED | OUTPATIENT
Start: 2022-04-22 | End: 2022-07-01 | Stop reason: CLARIF

## 2022-04-22 RX ORDER — METHYLPREDNISOLONE 4 MG/1
TABLET ORAL
Qty: 21 EACH | Refills: 0 | Status: SHIPPED | OUTPATIENT
Start: 2022-04-22 | End: 2022-05-13

## 2022-04-22 NOTE — TELEPHONE ENCOUNTER
Patient daughter is waiting in [facility], she says that her and her mother (patient) spoke with Dr. Greenfield recently regarding facial swelling and pain.  They were told something for pain would be called in to pharmacy (I believe she mentioned Toradol), but nothing was called in.  Patient/patient's daughter is requesting pain medication to be sent to [facility].  Please advise.

## 2022-05-01 NOTE — ED PROVIDER NOTES
"Chief Complaint  Chief Complaint   Patient presents with    Facial Swelling     C/o facial swelling to left side of face started a few days ago, reports was seen by dentist and has had xray and MRI for same currently awaiting results, reports now feeling like the swelling is in her neck and having difficulty swallowing her pills this morning, reports currently on amoxicillin but reports it "feels like it's stuck", no drooling noted, able to tolerate PO, no muffled voice       HPI   Gena Lui is a 66 y.o. female who presents with left-sided swelling to the face is been ongoing for several days.  Patient was seen already by a dentist and had x-ray MRI.  Patient is concerned because she swallowed a pill this morning and does not know if it was stuck or not.  No choking.  She just is unsure if she swallowed at our.  She denies any throat pain or drooling or trouble with her words or speech or any trouble swallowing liquids at this time    Past medical history  Past Medical History:   Diagnosis Date    Anxiety and depression 3/21/2018    Benign essential hypertension 3/21/2018    Chronic bilateral low back pain without sciatica 3/21/2018    History of stroke 3/21/2018    Memory loss     Mixed hyperlipidemia 3/21/2018    Seizures     Tobacco abuse 3/21/2018       Current Medications  No current facility-administered medications for this encounter.    Current Outpatient Medications:     ALPRAZolam (XANAX) 0.5 MG tablet, TAKE 1 TABLET BY MOUTH EVERY DAY AS NEEDED FOR INSOMNIA OR ANXIETY (PANIC ATTACK) (Patient taking differently: Take 0.5 mg by mouth daily as needed for Insomnia or Anxiety (or panic attack).), Disp: 30 tablet, Rfl: 2    amoxicillin (AMOXIL) 500 MG capsule, Take 1 capsule (500 mg total) by mouth 3 (three) times daily., Disp: 30 capsule, Rfl: 0    aspirin 81 MG Chew, Take 81 mg by mouth every evening. , Disp: , Rfl:     atorvastatin (LIPITOR) 80 MG tablet, TAKE 1 TABLET BY MOUTH EVERY DAY " (Patient taking differently: Take 80 mg by mouth once daily.), Disp: 90 tablet, Rfl: 3    ergocalciferol (ERGOCALCIFEROL) 50,000 unit Cap, TAKE ONE CAPSULE BY MOUTH ONE TIME PER WEEK (Patient taking differently: Take 50,000 Units by mouth every Sunday.), Disp: 12 capsule, Rfl: 6    Lactobacillus rhamnosus GG (CULTURELLE) 10 billion cell capsule, Take 1 capsule by mouth once daily., Disp: , Rfl:     levETIRAcetam (KEPPRA) 500 MG Tab, TAKE 1 TABLET BY MOUTH TWICE A DAY (Patient taking differently: Take 500 mg by mouth 2 (two) times daily. DO NOT CRUSH OR CHEW; SWALLOW WHOLE.), Disp: 180 tablet, Rfl: 4    lisinopriL 10 MG tablet, Take 1 tablet (10 mg total) by mouth once daily., Disp: 90 tablet, Rfl: 3    magnesium oxide (MAG-OX) 400 mg (241.3 mg magnesium) tablet, Take 1 tablet (400 mg total) by mouth after dinner., Disp: 90 tablet, Rfl: 4    multivit-min/iron/folic/lutein (MULTIVITAMIN WOMEN 50 PLUS ORAL), Take 1 tablet by mouth once daily., Disp: , Rfl:     methylPREDNISolone (MEDROL DOSEPACK) 4 mg tablet, use as directed, Disp: 21 each, Rfl: 0    mirtazapine (REMERON) 7.5 MG Tab, Take 1-2 tabs nightly for help with insomnia and appetite stimulation (Patient not taking: Reported on 4/21/2022), Disp: 30 tablet, Rfl: 11    sars-cov-2, covid-19, (MODERNA COVID-19) 100 mcg/0.5 ml injection, , Disp: , Rfl:     traMADoL (ULTRAM) 50 mg tablet, Take 1 tablet (50 mg total) by mouth every 8 (eight) hours as needed for Pain., Disp: 12 tablet, Rfl: 0    Allergies  Review of patient's allergies indicates:   Allergen Reactions    Versed [midazolam]      IV Versed altered mental status       Surgical history  Past Surgical History:   Procedure Laterality Date    ANTERIOR VAGINAL REPAIR      CATARACT EXTRACTION Right 10/16/2007    Dr. Ramsey //yag 5/1/18 Dr. Bentley    CATARACT EXTRACTION W/  INTRAOCULAR LENS IMPLANT Left 10/30/2007    Dr. Ramsey// mark 5/1/18 Dr. Bentley    CHOLECYSTECTOMY      COLONOSCOPY N/A  "2018    Procedure: COLONOSCOPY;  Surgeon: Yi Goncalves MD;  Location: [facility] ENDO;  Service: Endoscopy;  Laterality: N/A;    EYE SURGERY      ROBOT-ASSISTED LAPAROSCOPIC REPAIR OF VENTRAL HERNIA N/A 2021    Procedure: ROBOTIC REPAIR, HERNIA, VENTRAL;  Surgeon: Alli Dumont Jr., MD;  Location: [facility] OR;  Service: General;  Laterality: N/A;    TONSILLECTOMY      UMBILICAL HERNIA REPAIR N/A 2019    Procedure: REPAIR, HERNIA, UMBILICAL, AGE 5 YEARS OR OLDER, open with/without mesh;  Surgeon: Magno Mishra MD;  Location: [address];  Service: General;  Laterality: N/A;       Social history  Social History     Socioeconomic History    Marital status:    Tobacco Use    Smoking status: Light Tobacco Smoker     Types: Cigarettes     Last attempt to quit: 2019     Years since quittin.9    Smokeless tobacco: Never Used    Tobacco comment: started smoking again in 2020(when nervous/bored)   Substance and Sexual Activity    Alcohol use: Yes     Comment: 1-2 drinks daily    Drug use: Yes     Types: Marijuana     Comment: intermittent to take the edge off of her anxiety    Sexual activity: Never       Family History  Family History   Problem Relation Age of Onset    Blindness Neg Hx     Glaucoma Neg Hx     Macular degeneration Neg Hx     Retinal detachment Neg Hx        Review of systems  Constitutional: No fever or weakness.  Eyes: No redness, pain, or discharge.  Neurologic: No new focal weakness or sensory changes.  All systems otherwise negative except as noted in ROS and HPI    Physical Exam  Vital signs: /69 (BP Location: Left arm, Patient Position: Sitting)   Pulse 105   Temp 98.8 °F (37.1 °C) (Oral)   Resp 17   Ht 4' 11" (1.499 m)   Wt 43.5 kg (96 lb)   LMP  (LMP Unknown)   SpO2 99%   BMI 19.39 kg/m²   Constitutional: No acute distress.  Well developed, alert, oriented and appropriate.  HENT: Normocephalic, atraumatic. Normal ear, nose, and " throat.  Mild swelling to the left parotid  Eyes: PERRL, EOMI, normal conjunctiva.  Neck: Normal range of motion, no tenderness; supple.  Respiratory: Nonlabored breathing with normal breath sounds.  Cardiovascular: RRR with no pulse deficit.  GI: Soft, nontender, no rebound or guarding.  Musculoskeletal: Normal ROM, no tenderness, injury, or edema.  Skin: Warm, dry skin without infection or injury.  Neurologic: Normal motor, sensation with no new focal deficit.  Psychiatric: Affect normal, judgement normal, mood normal.  No SI, HI, and not gravely disabled.    Labs  Pertinent labs reviewed (see chart for details)  Labs Reviewed   CBC W/ AUTO DIFFERENTIAL - Abnormal; Notable for the following components:       Result Value    MCH 32.1 (*)     Immature Granulocytes 0.7 (*)     Gran # (ANC) 10.1 (*)     Immature Grans (Abs) 0.09 (*)     Gran % 81.5 (*)     Lymph % 10.6 (*)     All other components within normal limits   BASIC METABOLIC PANEL       ECG  Results for orders placed or performed during the hospital encounter of 06/28/21   EKG 12-lead    Collection Time: 06/28/21  1:13 PM    Narrative    Test Reason : Z01.818,    Vent. Rate : 080 BPM     Atrial Rate : 080 BPM     P-R Int : 170 ms          QRS Dur : 078 ms      QT Int : 376 ms       P-R-T Axes : 073 093 052 degrees     QTc Int : 433 ms    Normal sinus rhythm  Rightward axis  Abnormal ECG  When compared with ECG of 31-JAN-2019 14:54,  No significant change was found  Confirmed by Pavel Briones MD ([id number]) on 6/28/2021 9:31:58 PM    Referred By: [facility]           Confirmed By:Pavel Briones MD     ECG interpreted by ED MD    Radiology  No orders to display       Procedures    Procedures    Medications   clindamycin in D5W 900 mg/50 mL IVPB 900 mg (900 mg Intravenous New Bag 4/21/22 1122)   ketorolac injection 15 mg (15 mg Intravenous Given 4/21/22 1124)       ED course           ED management:  Patient given antibiotics and will follow up  outpatient.  She understands reasons to return emergency department  Patient appears safe and stable with no signs of obstruction or airway compromise.  She understands importance of following up in continued evaluation of this swelling    Disposition    Patient discharged in stable condition      Final impression  1. Parotid swelling        Critical care time spent with this patient was 0 minutes excluding the procedure time.              Tone Trejo MD  05/01/22 0758

## 2022-05-04 ENCOUNTER — OFFICE VISIT (OUTPATIENT)
Dept: OTOLARYNGOLOGY | Facility: CLINIC | Age: 67
End: 2022-05-04
Payer: MEDICARE

## 2022-05-04 VITALS
DIASTOLIC BLOOD PRESSURE: 73 MMHG | SYSTOLIC BLOOD PRESSURE: 110 MMHG | BODY MASS INDEX: 19.19 KG/M2 | WEIGHT: 95 LBS | HEART RATE: 88 BPM

## 2022-05-04 DIAGNOSIS — R22.0 MASS OF FACE: ICD-10-CM

## 2022-05-04 DIAGNOSIS — K11.20 PAROTITIS: Primary | ICD-10-CM

## 2022-05-04 DIAGNOSIS — R22.0 LEFT FACIAL SWELLING: ICD-10-CM

## 2022-05-04 PROCEDURE — 3044F PR MOST RECENT HEMOGLOBIN A1C LEVEL <7.0%: ICD-10-PCS | Mod: CPTII,S$GLB,, | Performed by: OTOLARYNGOLOGY

## 2022-05-04 PROCEDURE — 99999 PR PBB SHADOW E&M-EST. PATIENT-LVL III: ICD-10-PCS | Mod: PBBFAC,,, | Performed by: OTOLARYNGOLOGY

## 2022-05-04 PROCEDURE — 99204 OFFICE O/P NEW MOD 45 MIN: CPT | Mod: S$GLB,,, | Performed by: OTOLARYNGOLOGY

## 2022-05-04 PROCEDURE — 99999 PR PBB SHADOW E&M-EST. PATIENT-LVL III: CPT | Mod: PBBFAC,,, | Performed by: OTOLARYNGOLOGY

## 2022-05-04 PROCEDURE — 3074F PR MOST RECENT SYSTOLIC BLOOD PRESSURE < 130 MM HG: ICD-10-PCS | Mod: CPTII,S$GLB,, | Performed by: OTOLARYNGOLOGY

## 2022-05-04 PROCEDURE — 3008F PR BODY MASS INDEX (BMI) DOCUMENTED: ICD-10-PCS | Mod: CPTII,S$GLB,, | Performed by: OTOLARYNGOLOGY

## 2022-05-04 PROCEDURE — 3078F PR MOST RECENT DIASTOLIC BLOOD PRESSURE < 80 MM HG: ICD-10-PCS | Mod: CPTII,S$GLB,, | Performed by: OTOLARYNGOLOGY

## 2022-05-04 PROCEDURE — 1126F PR PAIN SEVERITY QUANTIFIED, NO PAIN PRESENT: ICD-10-PCS | Mod: CPTII,S$GLB,, | Performed by: OTOLARYNGOLOGY

## 2022-05-04 PROCEDURE — 3074F SYST BP LT 130 MM HG: CPT | Mod: CPTII,S$GLB,, | Performed by: OTOLARYNGOLOGY

## 2022-05-04 PROCEDURE — 4010F ACE/ARB THERAPY RXD/TAKEN: CPT | Mod: CPTII,S$GLB,, | Performed by: OTOLARYNGOLOGY

## 2022-05-04 PROCEDURE — 3008F BODY MASS INDEX DOCD: CPT | Mod: CPTII,S$GLB,, | Performed by: OTOLARYNGOLOGY

## 2022-05-04 PROCEDURE — 3078F DIAST BP <80 MM HG: CPT | Mod: CPTII,S$GLB,, | Performed by: OTOLARYNGOLOGY

## 2022-05-04 PROCEDURE — 4010F PR ACE/ARB THEARPY RXD/TAKEN: ICD-10-PCS | Mod: CPTII,S$GLB,, | Performed by: OTOLARYNGOLOGY

## 2022-05-04 PROCEDURE — 1126F AMNT PAIN NOTED NONE PRSNT: CPT | Mod: CPTII,S$GLB,, | Performed by: OTOLARYNGOLOGY

## 2022-05-04 PROCEDURE — 3044F HG A1C LEVEL LT 7.0%: CPT | Mod: CPTII,S$GLB,, | Performed by: OTOLARYNGOLOGY

## 2022-05-04 PROCEDURE — 99204 PR OFFICE/OUTPT VISIT, NEW, LEVL IV, 45-59 MIN: ICD-10-PCS | Mod: S$GLB,,, | Performed by: OTOLARYNGOLOGY

## 2022-05-04 RX ORDER — CLINDAMYCIN HYDROCHLORIDE 300 MG/1
300 CAPSULE ORAL 3 TIMES DAILY
Qty: 30 CAPSULE | Refills: 0 | Status: SHIPPED | OUTPATIENT
Start: 2022-05-04 | End: 2022-05-14

## 2022-05-04 NOTE — PROGRESS NOTES
Chief Complaint   Patient presents with    left facial swelling         66 y.o. female presents with 2 week history of left facial swelling that started after dental work for denture placement. Imaging done in the dental office determined that it was not dental related. Then seen by PCP who ordered an MRI which showed left parotid swelling and intraparotid lymph node enlargement. She was then given a course of Amoxicillin. Her swelling is much improved but she still feels a small lump near her ear. Also notes a funny taste in her mouth.      Review of Systems     Constitutional: Negative for fatigue and unexpected weight change.   HENT: per HPI.  Eyes: Negative for visual disturbance.   Respiratory: Negative for shortness of breath, hemoptysis  Cardiovascular: Negative for chest pain and palpitations.   Genitourinary: Negative for dysuria and difficulty urinating.   Musculoskeletal: Negative for decreased ROM, back pain.   Skin: Negative for rash, sunburn, itching.   Neurological: Negative for dizziness and seizures.   Hematological: Negative for adenopathy. Does not bruise/bleed easily.   Psychiatric/Behavioral: Negative for agitation. The patient is not nervous/anxious.   Endocrine: Negative for rapid weight loss/weight gain, heat/cold intolerance.     Past Medical History:   Diagnosis Date    Anxiety and depression 3/21/2018    Benign essential hypertension 3/21/2018    Chronic bilateral low back pain without sciatica 3/21/2018    History of stroke 3/21/2018    Memory loss     Mixed hyperlipidemia 3/21/2018    Seizures     Tobacco abuse 3/21/2018       Past Surgical History:   Procedure Laterality Date    ANTERIOR VAGINAL REPAIR      CATARACT EXTRACTION Right 10/16/2007    Dr. Ramsey //yag 5/1/18 Dr. Bentley    CATARACT EXTRACTION W/  INTRAOCULAR LENS IMPLANT Left 10/30/2007    Dr. Ramsey// mark 5/1/18 Dr. Bentley    CHOLECYSTECTOMY      COLONOSCOPY N/A 4/19/2018    Procedure: COLONOSCOPY;  Surgeon:  Yi Goncalves MD;  Location[facility] ENDO;  Service: Endoscopy;  Laterality: N/A;    EYE SURGERY      ROBOT-ASSISTED LAPAROSCOPIC REPAIR OF VENTRAL HERNIA N/A 6/28/2021    Procedure: ROBOTIC REPAIR, HERNIA, VENTRAL;  Surgeon: Alli Dumont Jr., MD;  Location: [facility] OR;  Service: General;  Laterality: N/A;    TONSILLECTOMY      UMBILICAL HERNIA REPAIR N/A 2/8/2019    Procedure: REPAIR, HERNIA, UMBILICAL, AGE 5 YEARS OR OLDER, open with/without mesh;  Surgeon: Magno Mishra MD;  Location: [address];  Service: General;  Laterality: N/A;       family history is not on file.    Pt  reports that she has been smoking cigarettes. She has never used smokeless tobacco. She reports current alcohol use. She reports current drug use. Drug: Marijuana.    Review of patient's allergies indicates:   Allergen Reactions    Versed [midazolam]      IV Versed altered mental status        Physical Exam    Vitals:    05/04/22 1059   BP: 110/73   Pulse: 88     Body mass index is 19.19 kg/m².    Physical Exam  Vitals and nursing note reviewed.   Constitutional:       General: She is not in acute distress.     Appearance: She is well-developed. She is not diaphoretic.   HENT:      Head: Normocephalic and atraumatic.        Right Ear: Hearing, tympanic membrane, ear canal and external ear normal. No decreased hearing noted. No middle ear effusion. Tympanic membrane has normal mobility.      Left Ear: Hearing, tympanic membrane, ear canal and external ear normal. No decreased hearing noted.  No middle ear effusion. Tympanic membrane has normal mobility.      Nose: Nose normal.      Mouth/Throat:      Mouth: Mucous membranes are moist.      Dentition: Abnormal dentition.      Tongue: Lesions present.      Palate: Mass present.     Eyes:      General: Lids are normal.         Right eye: No discharge.         Left eye: No discharge.      Conjunctiva/sclera: Conjunctivae normal.      Pupils: Pupils are equal, round, and  reactive to light.   Neck:      Thyroid: No thyroid mass or thyromegaly.      Trachea: Trachea and phonation normal. No tracheal tenderness or tracheal deviation.   Cardiovascular:      Heart sounds: Normal heart sounds.   Pulmonary:      Breath sounds: Normal breath sounds. No stridor.   Abdominal:      Palpations: Abdomen is soft.   Musculoskeletal:      Cervical back: Normal range of motion and neck supple. No edema or erythema.   Lymphadenopathy:      Cervical: No cervical adenopathy.   Skin:     General: Skin is warm and dry.      Coloration: Skin is not pale.      Findings: No erythema or rash.   Neurological:      Mental Status: She is alert and oriented to person, place, and time.       Imaging studies reviewed:  MRI face/neck 4/20/22: 1. Findings suggestive of acute left parotiditis with significant adjacent soft tissue edema involving multiple spaces as discussed above.  2. Small oval lesion within the inferior left parotid gland which could reflect reactive intraparotid lymph node or less likely benign or malignant primary parotid lesion.     Assessment     1. Parotitis    2. Left facial swelling    3. Mass of face          Plan  In summary, Ms. Lui is a 66 year old female with ongoing parotitis likely secondary to recent dental work and dehydration, improving. Recommend course of clindamycin as well as increased hydration, sialogogues, warm compresses and massage as tolerated. All questions were answered. Return to clinic if symptoms fail to improve or worsen.

## 2022-05-09 ENCOUNTER — TELEPHONE (OUTPATIENT)
Dept: FAMILY MEDICINE | Facility: CLINIC | Age: 67
End: 2022-05-09
Payer: MEDICARE

## 2022-05-09 NOTE — TELEPHONE ENCOUNTER
----- Message from Grzegorz Dias sent at 5/9/2022  9:42 AM CDT -----  Contact: patient  [phone]  Patient calling to speak with you regarding bumps in her neck   Please advise

## 2022-05-10 ENCOUNTER — TELEPHONE (OUTPATIENT)
Dept: FAMILY MEDICINE | Facility: CLINIC | Age: 67
End: 2022-05-10
Payer: MEDICARE

## 2022-05-10 NOTE — TELEPHONE ENCOUNTER
----- Message from Lauren Vazquez sent at 5/10/2022  7:53 AM CDT -----  Regarding: returning a call  Contact: [phone]  Type:  Patient Returning Call    Who Called: self   Who Left Message for Patient: Pham   Does the patient know what this is regarding?: appt   Would the patient rather a call back or a response via [facility]?  call  Best Call Back Number: [phone]  Additional Information:  the call came from a number from [place], [place]

## 2022-05-11 ENCOUNTER — TELEPHONE (OUTPATIENT)
Dept: FAMILY MEDICINE | Facility: CLINIC | Age: 67
End: 2022-05-11
Payer: MEDICARE

## 2022-05-11 NOTE — TELEPHONE ENCOUNTER
----- Message from Greg Mcmanus sent at 5/11/2022  8:26 AM CDT -----  Contact: pt.  .Type:  Same Day Appointment Request    Caller is requesting a same day appointment.  Caller declined first available appointment listed below.    Name of Caller:pt  When is the first available appointment?05/25/22  Symptoms:lump on her neck  Best Call Back Number:[phone]  Additional Information: Pt.  is  requesting appointment soon but she would like to be called before 2:00 p. M. and would like that she is called from a [facility] number.

## 2022-05-11 NOTE — TELEPHONE ENCOUNTER
Pt reports she is concerned of left parotid swelling. She reports that she she has done treatment with Amoxicillin and now she is currently taking Clindamycin. Pt is stil having discomfort and swelling. Scheduled for an in office visit with Dr. Flores. Pt verbalized an understanding.

## 2022-05-11 NOTE — TELEPHONE ENCOUNTER
----- Message from Nguyen Gaona sent at 5/11/2022  8:10 AM CDT -----  Regarding: Needs Appt ASAP  Type:  Same Day Appointment Request    Caller is requesting a same day appointment.  Caller declined first available appointment listed below.    Name of Caller: pt  When is the first available appointment? 05-25-22  Symptoms: lump in neck concerning her following recent MRI  Best Call Back Number: [phone]  Additional Information: prefers to be contacted from an [facility] phone number

## 2022-05-12 ENCOUNTER — PATIENT MESSAGE (OUTPATIENT)
Dept: SMOKING CESSATION | Facility: CLINIC | Age: 67
End: 2022-05-12
Payer: MEDICARE

## 2022-05-18 ENCOUNTER — OFFICE VISIT (OUTPATIENT)
Dept: FAMILY MEDICINE | Facility: CLINIC | Age: 67
End: 2022-05-18
Payer: MEDICARE

## 2022-05-18 ENCOUNTER — PES CALL (OUTPATIENT)
Dept: ADMINISTRATIVE | Facility: CLINIC | Age: 67
End: 2022-05-18
Payer: MEDICARE

## 2022-05-18 VITALS
SYSTOLIC BLOOD PRESSURE: 118 MMHG | BODY MASS INDEX: 19.56 KG/M2 | HEIGHT: 59 IN | WEIGHT: 97 LBS | HEART RATE: 81 BPM | OXYGEN SATURATION: 98 % | DIASTOLIC BLOOD PRESSURE: 77 MMHG

## 2022-05-18 DIAGNOSIS — R59.0 LEFT CERVICAL LYMPHADENOPATHY: ICD-10-CM

## 2022-05-18 DIAGNOSIS — Z97.2 WEARS DENTURES: ICD-10-CM

## 2022-05-18 DIAGNOSIS — K11.8 PAROTID MASS: ICD-10-CM

## 2022-05-18 DIAGNOSIS — M79.89 MASS OF SOFT TISSUE OF NECK: ICD-10-CM

## 2022-05-18 DIAGNOSIS — F10.11 HISTORY OF ALCOHOL ABUSE: ICD-10-CM

## 2022-05-18 DIAGNOSIS — K08.9 POOR DENTITION: ICD-10-CM

## 2022-05-18 DIAGNOSIS — Z79.82 LONG-TERM USE OF ASPIRIN THERAPY: ICD-10-CM

## 2022-05-18 DIAGNOSIS — Z86.73 HISTORY OF STROKE: ICD-10-CM

## 2022-05-18 DIAGNOSIS — I10 BENIGN ESSENTIAL HYPERTENSION: ICD-10-CM

## 2022-05-18 DIAGNOSIS — Z72.0 TOBACCO ABUSE: ICD-10-CM

## 2022-05-18 DIAGNOSIS — R60.0 SWELLING OF LEFT PAROTID GLAND: Primary | ICD-10-CM

## 2022-05-18 PROCEDURE — 99499 UNLISTED E&M SERVICE: CPT | Mod: S$GLB,,, | Performed by: FAMILY MEDICINE

## 2022-05-18 PROCEDURE — 4010F ACE/ARB THERAPY RXD/TAKEN: CPT | Mod: CPTII,S$GLB,, | Performed by: FAMILY MEDICINE

## 2022-05-18 PROCEDURE — 4010F PR ACE/ARB THEARPY RXD/TAKEN: ICD-10-PCS | Mod: CPTII,S$GLB,, | Performed by: FAMILY MEDICINE

## 2022-05-18 PROCEDURE — 1126F AMNT PAIN NOTED NONE PRSNT: CPT | Mod: CPTII,S$GLB,, | Performed by: FAMILY MEDICINE

## 2022-05-18 PROCEDURE — 3288F FALL RISK ASSESSMENT DOCD: CPT | Mod: CPTII,S$GLB,, | Performed by: FAMILY MEDICINE

## 2022-05-18 PROCEDURE — 3044F HG A1C LEVEL LT 7.0%: CPT | Mod: CPTII,S$GLB,, | Performed by: FAMILY MEDICINE

## 2022-05-18 PROCEDURE — 3288F PR FALLS RISK ASSESSMENT DOCUMENTED: ICD-10-PCS | Mod: CPTII,S$GLB,, | Performed by: FAMILY MEDICINE

## 2022-05-18 PROCEDURE — 1160F RVW MEDS BY RX/DR IN RCRD: CPT | Mod: CPTII,S$GLB,, | Performed by: FAMILY MEDICINE

## 2022-05-18 PROCEDURE — 1160F PR REVIEW ALL MEDS BY PRESCRIBER/CLIN PHARMACIST DOCUMENTED: ICD-10-PCS | Mod: CPTII,S$GLB,, | Performed by: FAMILY MEDICINE

## 2022-05-18 PROCEDURE — 99214 PR OFFICE/OUTPT VISIT, EST, LEVL IV, 30-39 MIN: ICD-10-PCS | Mod: S$GLB,,, | Performed by: FAMILY MEDICINE

## 2022-05-18 PROCEDURE — 99214 OFFICE O/P EST MOD 30 MIN: CPT | Mod: S$GLB,,, | Performed by: FAMILY MEDICINE

## 2022-05-18 PROCEDURE — 1159F MED LIST DOCD IN RCRD: CPT | Mod: CPTII,S$GLB,, | Performed by: FAMILY MEDICINE

## 2022-05-18 PROCEDURE — 3074F SYST BP LT 130 MM HG: CPT | Mod: CPTII,S$GLB,, | Performed by: FAMILY MEDICINE

## 2022-05-18 PROCEDURE — 1101F PR PT FALLS ASSESS DOC 0-1 FALLS W/OUT INJ PAST YR: ICD-10-PCS | Mod: CPTII,S$GLB,, | Performed by: FAMILY MEDICINE

## 2022-05-18 PROCEDURE — 1159F PR MEDICATION LIST DOCUMENTED IN MEDICAL RECORD: ICD-10-PCS | Mod: CPTII,S$GLB,, | Performed by: FAMILY MEDICINE

## 2022-05-18 PROCEDURE — 99999 PR PBB SHADOW E&M-EST. PATIENT-LVL IV: ICD-10-PCS | Mod: PBBFAC,,, | Performed by: FAMILY MEDICINE

## 2022-05-18 PROCEDURE — 3008F BODY MASS INDEX DOCD: CPT | Mod: CPTII,S$GLB,, | Performed by: FAMILY MEDICINE

## 2022-05-18 PROCEDURE — 1101F PT FALLS ASSESS-DOCD LE1/YR: CPT | Mod: CPTII,S$GLB,, | Performed by: FAMILY MEDICINE

## 2022-05-18 PROCEDURE — 3078F PR MOST RECENT DIASTOLIC BLOOD PRESSURE < 80 MM HG: ICD-10-PCS | Mod: CPTII,S$GLB,, | Performed by: FAMILY MEDICINE

## 2022-05-18 PROCEDURE — 3078F DIAST BP <80 MM HG: CPT | Mod: CPTII,S$GLB,, | Performed by: FAMILY MEDICINE

## 2022-05-18 PROCEDURE — 3044F PR MOST RECENT HEMOGLOBIN A1C LEVEL <7.0%: ICD-10-PCS | Mod: CPTII,S$GLB,, | Performed by: FAMILY MEDICINE

## 2022-05-18 PROCEDURE — 3074F PR MOST RECENT SYSTOLIC BLOOD PRESSURE < 130 MM HG: ICD-10-PCS | Mod: CPTII,S$GLB,, | Performed by: FAMILY MEDICINE

## 2022-05-18 PROCEDURE — 99499 RISK ADDL DX/OHS AUDIT: ICD-10-PCS | Mod: S$GLB,,, | Performed by: FAMILY MEDICINE

## 2022-05-18 PROCEDURE — 99999 PR PBB SHADOW E&M-EST. PATIENT-LVL IV: CPT | Mod: PBBFAC,,, | Performed by: FAMILY MEDICINE

## 2022-05-18 PROCEDURE — 1126F PR PAIN SEVERITY QUANTIFIED, NO PAIN PRESENT: ICD-10-PCS | Mod: CPTII,S$GLB,, | Performed by: FAMILY MEDICINE

## 2022-05-18 PROCEDURE — 3008F PR BODY MASS INDEX (BMI) DOCUMENTED: ICD-10-PCS | Mod: CPTII,S$GLB,, | Performed by: FAMILY MEDICINE

## 2022-05-18 NOTE — PROGRESS NOTES
Office Visit    Patient Name: Gena Lui    : 1955  MRN: [id number]    Subjective:  Gena is a 66 y.o. female who presents today for:    Follow-up (Parotid swelling)    66-year-old patient of mine most recently seen by me 2022 and with chronic conditions include hypertension controlled on Lisinopril 10 daily, hyperlipidemia with hypertriglyceridemia (Lipitor 80), history of seizures now controlled on Keppra, history of stroke (Lipitor and ASA 81 for secondary prevention), anxiety/depression- Xanax 0.5 PRN, osteoporosis-- improved to osteopenia with treatment of vitamin-D deficiency, tobacco abuse, who presents today to follow-up on recent parotid swelling/ parotitis.    Symptoms started back in April and she saw my colleague Dr. Greenfield for an urgent care visit for swelling of the Left Jaw region.    He ordered an MRI maxillofacial with and without contrast-results as per below:  MRI Maxofacial W & w/o 22:   1. Findings suggestive of acute left parotiditis with significant adjacent soft tissue edema involving multiple spaces as discussed above.  2. Small oval lesion within the inferior left parotid gland which could reflect reactive intraparotid lymph node or less likely benign or malignant primary parotid lesion.  Recommend repeat imaging when clinical symptoms minnie.    Seen in ER 21 and treated w/ IV Clindamycin and Toradol. Was having some trouble swallowing but that resolved after treatment.     Had and unremarkable naso pharyngoscopy 2022 per ENT Noah Hale.     Saw ENT 2022 with recommendations as follows:  ongoing parotitis likely secondary to recent dental work and dehydration, improving. Recommend course of clindamycin as well as increased hydration, sialogogues, warm compresses and massage as tolerated. All questions were answered. Return to clinic if symptoms fail to improve or worsen.    She is currently smoking 1/4 of a pack per day due to recent stress.     She is  concerned because she has now developed a new notable mass of her left neck and jaw region. Extra nervous because her fiance had some of these symptoms previously and ended up having Non-hodgkin's lymphoma that he passed from.        PAST MEDICAL HISTORY, SURGICAL/SOCIAL/FAMILY HISTORY REVIEWED AS PER CHART, WITH PERTINENT FINDINGS INCLUDED IN HISTORY SECTION OF NOTE.     Current Medications    Medication List with Changes/Refills   Current Medications    ALPRAZOLAM (XANAX) 0.5 MG TABLET    TAKE 1 TABLET BY MOUTH EVERY DAY AS NEEDED FOR INSOMNIA OR ANXIETY (PANIC ATTACK)    AMOXICILLIN (AMOXIL) 500 MG CAPSULE    Take 1 capsule (500 mg total) by mouth 3 (three) times daily.    ASPIRIN 81 MG CHEW    Take 81 mg by mouth every evening.     ATORVASTATIN (LIPITOR) 80 MG TABLET    TAKE 1 TABLET BY MOUTH EVERY DAY    ERGOCALCIFEROL (ERGOCALCIFEROL) 50,000 UNIT CAP    TAKE ONE CAPSULE BY MOUTH ONE TIME PER WEEK    LACTOBACILLUS RHAMNOSUS GG (CULTURELLE) 10 BILLION CELL CAPSULE    Take 1 capsule by mouth once daily.    LEVETIRACETAM (KEPPRA) 500 MG TAB    TAKE 1 TABLET BY MOUTH TWICE A DAY    LISINOPRIL 10 MG TABLET    Take 1 tablet (10 mg total) by mouth once daily.    MAGNESIUM OXIDE (MAG-OX) 400 MG (241.3 MG MAGNESIUM) TABLET    Take 1 tablet (400 mg total) by mouth after dinner.    MIRTAZAPINE (REMERON) 7.5 MG TAB    Take 1-2 tabs nightly for help with insomnia and appetite stimulation    MULTIVIT-MIN/IRON/FOLIC/LUTEIN (MULTIVITAMIN WOMEN 50 PLUS ORAL)    Take 1 tablet by mouth once daily.    SARS-COV-2, COVID-19, (MODERNA COVID-19) 100 MCG/0.5 ML INJECTION        TRAMADOL (ULTRAM) 50 MG TABLET    Take 1 tablet (50 mg total) by mouth every 8 (eight) hours as needed for Pain.       Allergies   Review of patient's allergies indicates:   Allergen Reactions    Versed [midazolam]      IV Versed altered mental status         Review of Systems (Pertinent positives)  Review of Systems   Constitutional: Negative for fever.  "  HENT: Positive for dental problem. Negative for sore throat and trouble swallowing.    Neurological: Negative for seizures.   Hematological: Positive for adenopathy (possible enlarge lymph garfield of left neck ).   Psychiatric/Behavioral: The patient is nervous/anxious.        /77 (BP Location: Right arm, Patient Position: Sitting)   Pulse 81   Ht 4' 11" (1.499 m)   Wt 44 kg (97 lb)   LMP  (LMP Unknown)   SpO2 98%   BMI 19.59 kg/m²     Physical Exam  Vitals reviewed.   Constitutional:       General: She is not in acute distress.     Appearance: Normal appearance. She is well-developed.   HENT:      Head: Normocephalic and atraumatic.      Jaw: No tenderness, swelling or pain on movement.   Eyes:      Conjunctiva/sclera: Conjunctivae normal.   Pulmonary:      Effort: Pulmonary effort is normal.   Musculoskeletal:      Right lower leg: No edema.      Left lower leg: No edema.   Lymphadenopathy:      Comments: Soft mobile 2 cm circumscribed mass (possibly LN) of left cervico mandibular junction. No left sided parotid swelling or tenderness   Skin:     General: Skin is warm and dry.   Neurological:      General: No focal deficit present.      Mental Status: She is alert and oriented to person, place, and time.   Psychiatric:         Mood and Affect: Mood is anxious.         Behavior: Behavior normal.           Assessment/Plan:  Gena Lui is a 66 y.o. female who presents today for :        ICD-10-CM ICD-9-CM    1. Swelling of left parotid gland  R60.0 782.3 MRI Maxillofacial W W/O Contrast   2. Parotid mass  K11.8 527.8 MRI Maxillofacial W W/O Contrast    4/20/22:Small oval lesion within the inferior L parotid gland which could reflect reactive intraparotid lymph node or less likely benign or malignant primary    3. Left cervical lymphadenopathy  R59.0 785.6 US Soft Tissue Head Neck Thyroid   4. Mass of soft tissue of neck  M79.89 729.99 US Soft Tissue Head Neck Thyroid   5. Benign essential hypertension  " I10 401.1    6. History of stroke  Z86.73 V12.54    7. Long-term use of aspirin therapy  Z79.82 V58.66    8. History of alcohol abuse  F10.11 305.03    9. Tobacco abuse  Z72.0 305.1    10. Wears dentures  Z97.2 V45.84    11. Poor dentition  K08.9 525.9      67 yo female with HTN, h/o Stroke, h/o alcohol abuse and off and on tobacco abuse who presents today due to concerns regarding recent parotitis and new soft tissue mass of the left neck region.     No evidence of ongoing parotid swelling s/p treatment with clindamycin. ENT feels likely cause of parotid issues to be recent dental work/dehydration/ denture issues and advised clindamycin hydration/sialogogues/warmcompresses/massage    Prior parotid imaging with recommendation for repeat imaging following parotiditis treatment to re-evaluate- Small oval lesion within the inferior left parotid gland which could reflect reactive intraparotid lymph node or less likely benign or malignant primary parotid lesion    Repeat maxillofacial MRI w/ and w/o contract ordered to re-evaluate lesion s/p antibiotic therapy and due to possible new LAD on that side.     Head and Neck US ordered for evla of circumscribed 2 cm mobile soft tissue mass of L cervico mandibular junction to determine etiology-- reactive LN vs malignancy concern w/ parotid lesion noted on MRI    Has upcoming appointment scheduled for labs/ follow up of chronic conditions.    Will continue follow up with her dental care provider given ongoing issues with poorly fitting dentures        There are no Patient Instructions on file for this visit.      Follow up for to review results of diagnostic procedure, return as needed for new concerns.

## 2022-05-19 PROBLEM — K08.9 POOR DENTITION: Status: ACTIVE | Noted: 2022-05-19

## 2022-05-19 PROBLEM — Z97.2 WEARS DENTURES: Status: ACTIVE | Noted: 2022-05-19

## 2022-05-27 ENCOUNTER — TELEPHONE (OUTPATIENT)
Dept: FAMILY MEDICINE | Facility: CLINIC | Age: 67
End: 2022-05-27
Payer: MEDICARE

## 2022-05-27 NOTE — TELEPHONE ENCOUNTER
"Called patient to inform her of the message below from Dr. Flores. Patient verbalized understanding.       "I know she is really anxious about this, but we really can not move up the MRI as I ordered her one  with and without contrast, which is needed to best characterize certain areas, however, there is a shortage of contrast dye so we have to go with the appointment given.     If she gets any trouble breathing or swallowing because of the lump then she should go to the ER.     I am glad to hear it is not painful at this time, and I would advise trying to wait until her scheduled imaging studies."  "

## 2022-05-27 NOTE — TELEPHONE ENCOUNTER
"Spoke to pt. Who reports that that the lump on her neck looks like " it's getting bigger" pt reports it does not hurt, she has just realized it is getting bigger and feels anxious because something similar happened to her . Pt is scheduled for an MRI next Friday. She reports she has been taking her anxiety meds everyday. Pt requested if there is anyway that the MRI and mandibular ultrasound can be sooner than this week?   "

## 2022-05-27 NOTE — TELEPHONE ENCOUNTER
I know she is really anxious about this, but we really can not move up the MRI as I ordered her one  with and without contrast, which is needed to best characterize certain areas, however, there is a shortage of contrast dye so we have to go with the appointment given.    If she gets any trouble breathing or swallowing because of the lump then she should go to the ER.    I am glad to hear it is not painful at this time, and I would advise trying to wait until her scheduled imaging studies.     thanks

## 2022-05-27 NOTE — TELEPHONE ENCOUNTER
----- Message from Antonietta Rolle sent at 5/26/2022  4:30 PM CDT -----  Contact: daughter  Type:  Needs Medical Advice    Who Called: daughter (sirisha)  Symptoms (please be specific): lump on her neck getting bigger  Effecting her job  How long has patient had these symptoms:  1 month  Pharmacy name and phone #:    Would the patient rather a call back or a response via [facility]? Call   Best Call Back Number:   Additional Information:     Call back back

## 2022-06-03 ENCOUNTER — HOSPITAL ENCOUNTER (OUTPATIENT)
Dept: RADIOLOGY | Facility: HOSPITAL | Age: 67
Discharge: HOME | End: 2022-06-03
Attending: FAMILY MEDICINE
Payer: MEDICARE

## 2022-06-03 DIAGNOSIS — K11.8 PAROTID MASS: ICD-10-CM

## 2022-06-03 DIAGNOSIS — M79.89 MASS OF SOFT TISSUE OF NECK: ICD-10-CM

## 2022-06-03 DIAGNOSIS — R60.0 SWELLING OF LEFT PAROTID GLAND: ICD-10-CM

## 2022-06-03 DIAGNOSIS — R59.0 LEFT CERVICAL LYMPHADENOPATHY: ICD-10-CM

## 2022-06-03 PROCEDURE — 70543 MRI ORBT/FAC/NCK W/O &W/DYE: CPT | Mod: 26,,, | Performed by: RADIOLOGY

## 2022-06-03 PROCEDURE — A9585 GADOBUTROL INJECTION: HCPCS | Performed by: FAMILY MEDICINE

## 2022-06-03 PROCEDURE — 25500020 PHARM REV CODE 255: Performed by: FAMILY MEDICINE

## 2022-06-03 PROCEDURE — 76536 US SOFT TISSUE HEAD NECK THYROID: ICD-10-PCS | Mod: 26,,, | Performed by: RADIOLOGY

## 2022-06-03 PROCEDURE — 76536 US EXAM OF HEAD AND NECK: CPT | Mod: TC

## 2022-06-03 PROCEDURE — 76536 US EXAM OF HEAD AND NECK: CPT | Mod: 26,,, | Performed by: RADIOLOGY

## 2022-06-03 PROCEDURE — 70543 MRI MAXILLOFACIAL W W/O CONTRAST: ICD-10-PCS | Mod: 26,,, | Performed by: RADIOLOGY

## 2022-06-03 PROCEDURE — 70543 MRI ORBT/FAC/NCK W/O &W/DYE: CPT | Mod: TC

## 2022-06-03 RX ORDER — GADOBUTROL 604.72 MG/ML
5 INJECTION INTRAVENOUS
Status: COMPLETED | OUTPATIENT
Start: 2022-06-03 | End: 2022-06-03

## 2022-06-03 RX ADMIN — GADOBUTROL 5 ML: 604.72 INJECTION INTRAVENOUS at 07:06

## 2022-06-07 ENCOUNTER — TELEPHONE (OUTPATIENT)
Dept: FAMILY MEDICINE | Facility: CLINIC | Age: 67
End: 2022-06-07
Payer: MEDICARE

## 2022-06-07 NOTE — TELEPHONE ENCOUNTER
----- Message from Natacha Lainez sent at 6/7/2022  8:49 AM CDT -----  Regarding: test results  Type:  Patient Returning Call    Who Called:patient     Who Left Message for Patient:n/a    Does the patient know what this is regarding?:MRI results     Would the patient rather a call back or a response via [facility]? Call back     Best Call Back Number:[phone]    Additional Information: n/a

## 2022-06-09 ENCOUNTER — TELEPHONE (OUTPATIENT)
Dept: FAMILY MEDICINE | Facility: CLINIC | Age: 67
End: 2022-06-09
Payer: MEDICARE

## 2022-06-09 ENCOUNTER — PATIENT MESSAGE (OUTPATIENT)
Dept: FAMILY MEDICINE | Facility: CLINIC | Age: 67
End: 2022-06-09
Payer: MEDICARE

## 2022-06-09 DIAGNOSIS — M79.89 MASS OF SOFT TISSUE OF NECK: ICD-10-CM

## 2022-06-09 DIAGNOSIS — K11.20 PAROTITIS: Primary | ICD-10-CM

## 2022-06-09 NOTE — TELEPHONE ENCOUNTER
----- Message from Deborah East, Patient Care Assistant sent at 6/9/2022 11:49 AM CDT -----  Type:  Test Results    Who Called:  pt  Name of Test (Lab/Mammo/Etc):  MRI/ US  Date of Test:  06/03  Ordering Provider:  Mark  Where the test was performed[facility]  Would the patient rather a call back or a response via [facility]?  Please call  Best Call Back Number: [phone]    Additional Information:

## 2022-06-10 ENCOUNTER — TELEPHONE (OUTPATIENT)
Dept: FAMILY MEDICINE | Facility: CLINIC | Age: 67
End: 2022-06-10
Payer: MEDICARE

## 2022-06-10 NOTE — TELEPHONE ENCOUNTER
Tried to call patient but no answer twice. Call machine said the text mail subscriber is not available please try again later. Will send message to the patient through patient portal.

## 2022-06-10 NOTE — TELEPHONE ENCOUNTER
Called patient to inform her that Dr. Flores said she posted the results in her portal and that the patient has an ENT follow up scheduled. Patient verbalized understanding.    Patient said she noticed some blood and she believe it is coming from behind. The blood was light. Patient said she's only saw it one time so far and just wanted to inform you.  This happened yesterday and patient said that it freaked her out.

## 2022-06-10 NOTE — TELEPHONE ENCOUNTER
----- Message from Destiny Vegas sent at 6/10/2022  9:23 AM CDT -----  Patient requesting MRI results

## 2022-06-12 NOTE — TELEPHONE ENCOUNTER
Her most recent bleeding is most likely due to some hemorrhoidal bleeding.     Her colonoscopy is up to date, though it is due next year, so I'm encouraged that there is nothing serious causing the bleeding and also hemorrhoids were seen on her piror colonoscopy.     To decrease hemorrhoidal bleeding she should ensure she is not constipation  (she can follow up with me for VV if having trouble with that) and use over the counter preparation H rectal suppositories after bowel movements and at bedtime.    She should notify me if the bleeding persists or worsens.

## 2022-06-27 ENCOUNTER — OFFICE VISIT (OUTPATIENT)
Dept: OTOLARYNGOLOGY | Facility: CLINIC | Age: 67
End: 2022-06-27
Payer: MEDICARE

## 2022-06-27 VITALS
WEIGHT: 93.94 LBS | TEMPERATURE: 98 F | HEART RATE: 91 BPM | DIASTOLIC BLOOD PRESSURE: 57 MMHG | BODY MASS INDEX: 18.97 KG/M2 | SYSTOLIC BLOOD PRESSURE: 111 MMHG

## 2022-06-27 DIAGNOSIS — M79.89 MASS OF SOFT TISSUE OF NECK: ICD-10-CM

## 2022-06-27 DIAGNOSIS — K11.8 PAROTID MASS: ICD-10-CM

## 2022-06-27 DIAGNOSIS — K11.20 PAROTITIS: ICD-10-CM

## 2022-06-27 PROCEDURE — 3288F PR FALLS RISK ASSESSMENT DOCUMENTED: ICD-10-PCS | Mod: CPTII,S$GLB,, | Performed by: OTOLARYNGOLOGY

## 2022-06-27 PROCEDURE — 1126F PR PAIN SEVERITY QUANTIFIED, NO PAIN PRESENT: ICD-10-PCS | Mod: CPTII,S$GLB,, | Performed by: OTOLARYNGOLOGY

## 2022-06-27 PROCEDURE — 4010F ACE/ARB THERAPY RXD/TAKEN: CPT | Mod: CPTII,S$GLB,, | Performed by: OTOLARYNGOLOGY

## 2022-06-27 PROCEDURE — 1159F PR MEDICATION LIST DOCUMENTED IN MEDICAL RECORD: ICD-10-PCS | Mod: CPTII,S$GLB,, | Performed by: OTOLARYNGOLOGY

## 2022-06-27 PROCEDURE — 1160F RVW MEDS BY RX/DR IN RCRD: CPT | Mod: CPTII,S$GLB,, | Performed by: OTOLARYNGOLOGY

## 2022-06-27 PROCEDURE — 1126F AMNT PAIN NOTED NONE PRSNT: CPT | Mod: CPTII,S$GLB,, | Performed by: OTOLARYNGOLOGY

## 2022-06-27 PROCEDURE — 4010F PR ACE/ARB THEARPY RXD/TAKEN: ICD-10-PCS | Mod: CPTII,S$GLB,, | Performed by: OTOLARYNGOLOGY

## 2022-06-27 PROCEDURE — 3074F SYST BP LT 130 MM HG: CPT | Mod: CPTII,S$GLB,, | Performed by: OTOLARYNGOLOGY

## 2022-06-27 PROCEDURE — 3008F BODY MASS INDEX DOCD: CPT | Mod: CPTII,S$GLB,, | Performed by: OTOLARYNGOLOGY

## 2022-06-27 PROCEDURE — 99999 PR PBB SHADOW E&M-EST. PATIENT-LVL V: CPT | Mod: PBBFAC,,, | Performed by: OTOLARYNGOLOGY

## 2022-06-27 PROCEDURE — 3288F FALL RISK ASSESSMENT DOCD: CPT | Mod: CPTII,S$GLB,, | Performed by: OTOLARYNGOLOGY

## 2022-06-27 PROCEDURE — 3074F PR MOST RECENT SYSTOLIC BLOOD PRESSURE < 130 MM HG: ICD-10-PCS | Mod: CPTII,S$GLB,, | Performed by: OTOLARYNGOLOGY

## 2022-06-27 PROCEDURE — 3008F PR BODY MASS INDEX (BMI) DOCUMENTED: ICD-10-PCS | Mod: CPTII,S$GLB,, | Performed by: OTOLARYNGOLOGY

## 2022-06-27 PROCEDURE — 1101F PR PT FALLS ASSESS DOC 0-1 FALLS W/OUT INJ PAST YR: ICD-10-PCS | Mod: CPTII,S$GLB,, | Performed by: OTOLARYNGOLOGY

## 2022-06-27 PROCEDURE — 3078F DIAST BP <80 MM HG: CPT | Mod: CPTII,S$GLB,, | Performed by: OTOLARYNGOLOGY

## 2022-06-27 PROCEDURE — 3078F PR MOST RECENT DIASTOLIC BLOOD PRESSURE < 80 MM HG: ICD-10-PCS | Mod: CPTII,S$GLB,, | Performed by: OTOLARYNGOLOGY

## 2022-06-27 PROCEDURE — 1160F PR REVIEW ALL MEDS BY PRESCRIBER/CLIN PHARMACIST DOCUMENTED: ICD-10-PCS | Mod: CPTII,S$GLB,, | Performed by: OTOLARYNGOLOGY

## 2022-06-27 PROCEDURE — 99213 OFFICE O/P EST LOW 20 MIN: CPT | Mod: S$GLB,,, | Performed by: OTOLARYNGOLOGY

## 2022-06-27 PROCEDURE — 3044F HG A1C LEVEL LT 7.0%: CPT | Mod: CPTII,S$GLB,, | Performed by: OTOLARYNGOLOGY

## 2022-06-27 PROCEDURE — 1159F MED LIST DOCD IN RCRD: CPT | Mod: CPTII,S$GLB,, | Performed by: OTOLARYNGOLOGY

## 2022-06-27 PROCEDURE — 99213 PR OFFICE/OUTPT VISIT, EST, LEVL III, 20-29 MIN: ICD-10-PCS | Mod: S$GLB,,, | Performed by: OTOLARYNGOLOGY

## 2022-06-27 PROCEDURE — 3044F PR MOST RECENT HEMOGLOBIN A1C LEVEL <7.0%: ICD-10-PCS | Mod: CPTII,S$GLB,, | Performed by: OTOLARYNGOLOGY

## 2022-06-27 PROCEDURE — 99999 PR PBB SHADOW E&M-EST. PATIENT-LVL V: ICD-10-PCS | Mod: PBBFAC,,, | Performed by: OTOLARYNGOLOGY

## 2022-06-27 PROCEDURE — 1101F PT FALLS ASSESS-DOCD LE1/YR: CPT | Mod: CPTII,S$GLB,, | Performed by: OTOLARYNGOLOGY

## 2022-06-28 PROBLEM — K11.8 PAROTID MASS: Status: ACTIVE | Noted: 2022-06-28

## 2022-06-28 RX ORDER — SODIUM CHLORIDE 0.9 % (FLUSH) 0.9 %
10 SYRINGE (ML) INJECTION
Status: CANCELLED | OUTPATIENT
Start: 2022-06-28

## 2022-06-28 RX ORDER — LIDOCAINE HYDROCHLORIDE 10 MG/ML
1 INJECTION, SOLUTION EPIDURAL; INFILTRATION; INTRACAUDAL; PERINEURAL ONCE
Status: CANCELLED | OUTPATIENT
Start: 2022-06-28 | End: 2022-06-28

## 2022-06-28 NOTE — PROGRESS NOTES
Chief Complaint   Patient presents with    consult/ lump on left side of face behind ear       HPI   66 y.o. female presents for evaluation of left parotid mass.  She reports this arose shortly after she was fitted with a set of dentures.  She denies pain.  She is concerned about the presence of this mass given the fact that her   of lymphoma.    Review of Systems   Constitutional: Negative for fatigue and unexpected weight change.   HENT: Per HPI.  Eyes: Negative for visual disturbance.   Respiratory: Negative for shortness of breath, hemoptysis   Cardiovascular: Negative for chest pain and palpitations.   Musculoskeletal: Negative for decreased ROM, back pain.   Skin: Negative for rash, sunburn, itching.   Neurological: Negative for dizziness and seizures.   Hematological: Negative for adenopathy. Does not bruise/bleed easily.   Endocrine: Negative for rapid weight loss/weight gain, heat/cold intolerance.     Past Medical History   Patient Active Problem List   Diagnosis    Anxiety and depression    Benign essential hypertension    Mixed hyperlipidemia    History of stroke    Chronic bilateral low back pain without sciatica    Long-term use of aspirin therapy    Former smoker    History of seizures    Postmenopausal osteoporosis    Vitamin D deficiency    History of alcohol abuse    H/O umbilical hernia repair    Abnormal TSH    History of squamous cell carcinoma of skin    Trichomonal vaginitis    Wears dentures    Poor dentition    Parotid mass           Past Surgical History   Past Surgical History:   Procedure Laterality Date    ANTERIOR VAGINAL REPAIR      CATARACT EXTRACTION Right 10/16/2007    Dr. Ramsey //yag 18 Dr. Bentley    CATARACT EXTRACTION W/  INTRAOCULAR LENS IMPLANT Left 10/30/2007    Dr. Ramsey// yag 18 Dr. Bentley    CHOLECYSTECTOMY      COLONOSCOPY N/A 2018    Procedure: COLONOSCOPY;  Surgeon: Yi Goncalves MD;  Location: [facility];  Service:  Endoscopy;  Laterality: N/A;    EYE SURGERY      ROBOT-ASSISTED LAPAROSCOPIC REPAIR OF VENTRAL HERNIA N/A 6/28/2021    Procedure: ROBOTIC REPAIR, HERNIA, VENTRAL;  Surgeon: Alli Dumont Jr., MD;  Location: [facility];  Service: General;  Laterality: N/A;    TONSILLECTOMY      UMBILICAL HERNIA REPAIR N/A 2/8/2019    Procedure: REPAIR, HERNIA, UMBILICAL, AGE 5 YEARS OR OLDER, open with/without mesh;  Surgeon: Magno Mishra MD;  Location: [address];  Service: General;  Laterality: N/A;         Family History   Family History   Problem Relation Age of Onset    Blindness Neg Hx     Glaucoma Neg Hx     Macular degeneration Neg Hx     Retinal detachment Neg Hx            Social History   .  Social History     Socioeconomic History    Marital status:    Tobacco Use    Smoking status: Light Tobacco Smoker     Types: Cigarettes     Last attempt to quit: 5/5/2019     Years since quitting: 3.1    Smokeless tobacco: Never Used    Tobacco comment: started smoking again in 12/2020(when nervous/bored)   Substance and Sexual Activity    Alcohol use: Yes     Comment: 1-2 drinks daily    Drug use: Yes     Types: Marijuana     Comment: intermittent to take the edge off of her anxiety    Sexual activity: Never         Allergies   Review of patient's allergies indicates:   Allergen Reactions    Versed [midazolam]      IV Versed altered mental status           Physical Exam     Vitals:    06/27/22 1544   BP: (!) 111/57   Pulse: 91   Temp: 98.3 °F (36.8 °C)         Body mass index is 18.97 kg/m².      General: AOx3, NAD   Respiratory:  Symmetric chest rise, normal effort  Nose: No gross nasal septal deviation. Inferior Turbinates WNL bilaterally. No septal perforation. No masses/lesions.   Oral Cavity:  Oral Tongue mobile, no lesions noted. Hard Palate WNL. No buccal or FOM lesions.  Oropharynx:  No masses/lesions of the posterior pharyngeal wall. Tonsillar fossa without lesions. Soft palate without  masses. Midline uvula.   Neck: Soft, mobile roughly 2 cm mass of L parotid tail.  No cervical lymphadenopathy, thyromegaly or thyroid nodules.  Normal range of motion.    Face: House Brackmann I bilaterally.     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  I offered her a needle biopsy but she stated that she wishes to undergo resection of this lesion.  We will plan for parotidectomy with frozen section analysis and possible neck dissection pending frozen section results.  Surgery is scheduled on July 8, 2022.              Other Visit Diagnoses     Parotitis        Mass of soft tissue of neck        Relevant Orders    Case Request Operating Room: EXCISION, PAROTID GLAND (Completed)

## 2022-06-28 NOTE — H&P (VIEW-ONLY)
Chief Complaint   Patient presents with    consult/ lump on left side of face behind ear       HPI   66 y.o. female presents for evaluation of left parotid mass.  She reports this arose shortly after she was fitted with a set of dentures.  She denies pain.  She is concerned about the presence of this mass given the fact that her   of lymphoma.    Review of Systems   Constitutional: Negative for fatigue and unexpected weight change.   HENT: Per HPI.  Eyes: Negative for visual disturbance.   Respiratory: Negative for shortness of breath, hemoptysis   Cardiovascular: Negative for chest pain and palpitations.   Musculoskeletal: Negative for decreased ROM, back pain.   Skin: Negative for rash, sunburn, itching.   Neurological: Negative for dizziness and seizures.   Hematological: Negative for adenopathy. Does not bruise/bleed easily.   Endocrine: Negative for rapid weight loss/weight gain, heat/cold intolerance.     Past Medical History   Patient Active Problem List   Diagnosis    Anxiety and depression    Benign essential hypertension    Mixed hyperlipidemia    History of stroke    Chronic bilateral low back pain without sciatica    Long-term use of aspirin therapy    Former smoker    History of seizures    Postmenopausal osteoporosis    Vitamin D deficiency    History of alcohol abuse    H/O umbilical hernia repair    Abnormal TSH    History of squamous cell carcinoma of skin    Trichomonal vaginitis    Wears dentures    Poor dentition    Parotid mass           Past Surgical History   Past Surgical History:   Procedure Laterality Date    ANTERIOR VAGINAL REPAIR      CATARACT EXTRACTION Right 10/16/2007    Dr. Ramsey //yag 18 Dr. Bentley    CATARACT EXTRACTION W/  INTRAOCULAR LENS IMPLANT Left 10/30/2007    Dr. Ramsey// yag 18 Dr. Bentley    CHOLECYSTECTOMY      COLONOSCOPY N/A 2018    Procedure: COLONOSCOPY;  Surgeon: Yi Goncalves MD;  Location[facility];  Service:  Endoscopy;  Laterality: N/A;    EYE SURGERY      ROBOT-ASSISTED LAPAROSCOPIC REPAIR OF VENTRAL HERNIA N/A 6/28/2021    Procedure: ROBOTIC REPAIR, HERNIA, VENTRAL;  Surgeon: Alli Dumont Jr., MD;  Location[facility];  Service: General;  Laterality: N/A;    TONSILLECTOMY      UMBILICAL HERNIA REPAIR N/A 2/8/2019    Procedure: REPAIR, HERNIA, UMBILICAL, AGE 5 YEARS OR OLDER, open with/without mesh;  Surgeon: Magno Mishra MD;  Location: [address];  Service: General;  Laterality: N/A;         Family History   Family History   Problem Relation Age of Onset    Blindness Neg Hx     Glaucoma Neg Hx     Macular degeneration Neg Hx     Retinal detachment Neg Hx            Social History   .  Social History     Socioeconomic History    Marital status:    Tobacco Use    Smoking status: Light Tobacco Smoker     Types: Cigarettes     Last attempt to quit: 5/5/2019     Years since quitting: 3.1    Smokeless tobacco: Never Used    Tobacco comment: started smoking again in 12/2020(when nervous/bored)   Substance and Sexual Activity    Alcohol use: Yes     Comment: 1-2 drinks daily    Drug use: Yes     Types: Marijuana     Comment: intermittent to take the edge off of her anxiety    Sexual activity: Never         Allergies   Review of patient's allergies indicates:   Allergen Reactions    Versed [midazolam]      IV Versed altered mental status           Physical Exam     Vitals:    06/27/22 1544   BP: (!) 111/57   Pulse: 91   Temp: 98.3 °F (36.8 °C)         Body mass index is 18.97 kg/m².      General: AOx3, NAD   Respiratory:  Symmetric chest rise, normal effort  Nose: No gross nasal septal deviation. Inferior Turbinates WNL bilaterally. No septal perforation. No masses/lesions.   Oral Cavity:  Oral Tongue mobile, no lesions noted. Hard Palate WNL. No buccal or FOM lesions.  Oropharynx:  No masses/lesions of the posterior pharyngeal wall. Tonsillar fossa without lesions. Soft palate without  masses. Midline uvula.   Neck: Soft, mobile roughly 2 cm mass of L parotid tail.  No cervical lymphadenopathy, thyromegaly or thyroid nodules.  Normal range of motion.    Face: House Brackmann I bilaterally.     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  I offered her a needle biopsy but she stated that she wishes to undergo resection of this lesion.  We will plan for parotidectomy with frozen section analysis and possible neck dissection pending frozen section results.  Surgery is scheduled on July 8, 2022.              Other Visit Diagnoses     Parotitis        Mass of soft tissue of neck        Relevant Orders    Case Request Operating Room: EXCISION, PAROTID GLAND (Completed)

## 2022-06-28 NOTE — ASSESSMENT & PLAN NOTE
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  I offered her a needle biopsy but she stated that she wishes to undergo resection of this lesion.  We will plan for parotidectomy with frozen section analysis and possible neck dissection pending frozen section results.  Surgery is scheduled on July 8, 2022.

## 2022-07-01 ENCOUNTER — TELEPHONE (OUTPATIENT)
Dept: FAMILY MEDICINE | Facility: CLINIC | Age: 67
End: 2022-07-01
Payer: MEDICARE

## 2022-07-01 ENCOUNTER — TELEPHONE (OUTPATIENT)
Dept: PREADMISSION TESTING | Facility: HOSPITAL | Age: 67
End: 2022-07-01
Payer: MEDICARE

## 2022-07-01 NOTE — ANESTHESIA PAT ROS NOTE
07/01/2022  Gena Lui is a 66 y.o., female.      Pre-op Assessment          Review of Systems  Anesthesia Hx:  Denies Hx of Anesthetic complications  History of prior surgery of interest to airway management or planning: Previous anesthesia: General ROBOTIC REPAIR, HERNIA, VENTRAL (N/A Abdomen 6/28/21 with general anesthesia.  Procedure performed at an [facility]. Airway issues documented on chart review include mask, easy, videolaryngoscope used , view on video-laryngoscopy Grade I    Denies Family Hx of Anesthesia complications.   Denies Personal Hx of Anesthesia complications.   Social:  Smoker, Alcohol Use    Hematology/Oncology:  Hematology Normal       -- Cancer in past history (SQUAMOUS CELL CARCINOMA OF SKIN):    EENT/Dental:   MASS OF SOFT TISSUE OF NECK. DENTURES.   Cardiovascular:   Hypertension  Denies Angina. hyperlipidemia  Functional Capacity good / => 4 METS    Pulmonary:   Denies Shortness of breath.  Denies Recent URI.    Renal/:  Renal/ Normal     Hepatic/GI:   HX OF UMBILICAL HERNIA SURGICAL REPAIR.   Musculoskeletal:   HX OF HIP FRACTURE. Musculoskeletal General/Symptoms: low back pain, sciatica.  Bone Disorders: Osteoporosis    Neurological:   CVA (PRIOR TO 2008), no residual symptoms Seizures, well controlled    Endocrine:   HX OF ABNORMAL TSH.   Psych:   Psychiatric History anxiety depression HX OF ALCOHOL ABUSE            Anesthesia Assessment: Preoperative EQUATION    Planned Procedure: Procedure(s) (LRB):  EXCISION, PAROTID GLAND (Left)  Requested Anesthesia Type:General  Surgeon: Kit Conley MD  Service: ENT  Known or anticipated Date of Surgery:7/8/2022    Surgeon notes: reviewed    Electronic QUestionnaire Assessment completed via nurse interview with patient.        Triage considerations:     The patient has no apparent active cardiac condition (No  unstable coronary Syndrome such as severe unstable angina or recent [<1 month] myocardial infarction, decompensated CHF, severe valvular   disease or significant arrhythmia)    Previous anesthesia records:GETA and VIDEOLARYNGOSCOPE USED     Airway/Jaw/Neck:  Airway Findings: Mouth Opening: Normal Tongue: Normal  General Airway Assessment: Adult  Mallampati: II  Improves to II with phonation.  TM Distance: Normal, at least 6 cm       Intubation     Date/Time: 6/28/2021 2:28 PM  Performed by: Desmond Santos CRNA  Authorized by: Parth Silver MD      Intubation:     Induction:  Intravenous    Intubated:  Postinduction    Mask Ventilation:  Easy mask    Attempts:  1    Attempted By:  CRNA    Method of Intubation:  Video laryngoscopy    Blade:  Vu 3    Laryngeal View Grade: Grade I - full view of chords      Difficult Airway Encountered?: No      Complications:  None    Airway Device:  Oral endotracheal tube    Airway Device Size:  7.0    Style/Cuff Inflation:  Cuffed (inflated to minimal occlusive pressure)    Inflation Amount (mL):  4    Tube secured:  20    Secured at:  The lips    Placement Verified By:  Capnometry    Complicating Factors:  None    Findings Post-Intubation:  BS equal bilateral and atraumatic/condition of teeth unchanged    Last PCP note: within 3 months , within [facility]   Subspecialty notes: ENT    Other important co-morbidities: HLD, HTN, Smoker and HX OF CVA >14 YEARS AGO, MASS OF SOFT TISSUE OF NECK      Tests already available:  Available tests,  within 3 months , within [facility] .     6/3/22  MRI MAXILLOFACIAL  US SOFT TISSUE HEAD NECK THYROID    4/21/22  BMP  CBC    4/20/22  A1C-5.2  CMP  CBC  TSH  MAGNESIUM  B12  LIPID PANEL             Instructions given. (See in Nurse's note)    Optimization:  Anesthesia Preop Clinic Assessment NOT Indicated    Medical Opinion Indicated TBCB PCP       Sub-specialist consult indicated:   TBD       Plan:    Testing:  EKG       Consultation:Patient's  PCP for a statement of optimization      Patient  has previously scheduled Medical Appointment: NOT AT THIS TIME    Navigation: Tests Scheduled.              Consults scheduled.             Results will be tracked by Preop Clinic.    Pre-op examination  - CBC Auto Differential; Future  - Comprehensive Metabolic Panel; Future  - X-Ray Chest PA And Lateral; Future  EKG done yesterday reviewed     ACC/AHA 2007 Guidelines for clearance     Step 1: No need for emergency non cardiac surgery  Step 2: No active cardiac conditions  Step 3: No low risk surgery  Step4: Yes - Functional capacity greater than or equal to 4 METs without symptoms - YES - Class IIa, GIANCARLO B - Proceed with surgery     She is Low to intermediate risk for surgery. Her medical conditions are optimized and she can proceed to scheduled surgery.   Jennifer Griffin MD

## 2022-07-01 NOTE — TELEPHONE ENCOUNTER
----- Message from Flower Farr RN sent at 7/1/2022  2:10 PM CDT -----  Dr. Flores,         This patient is scheduled for surgery (parotid gland excision) on 7/8/2022 with Dr. Conley. This will last approximately 120 minutes under general anesthesia. Requesting medical optimization prior to this procedure. Please advise. Will await your response.                                        Thank you,                                                   SHAKEEL Crenshaw BSN                                       [facility]

## 2022-07-02 ENCOUNTER — TELEPHONE (OUTPATIENT)
Dept: FAMILY MEDICINE | Facility: CLINIC | Age: 67
End: 2022-07-02
Payer: MEDICARE

## 2022-07-02 NOTE — TELEPHONE ENCOUNTER
With me being out of the office this coming week and the fact that this is a moderate risk surgery in a pateint with some risk factors, could she be scheduled ASAP with a colleague for pre-op clearance this week?     Thanks!

## 2022-07-05 ENCOUNTER — HOSPITAL ENCOUNTER (OUTPATIENT)
Dept: CARDIOLOGY | Facility: CLINIC | Age: 67
Discharge: HOME | End: 2022-07-05
Payer: MEDICARE

## 2022-07-05 ENCOUNTER — TELEPHONE (OUTPATIENT)
Dept: FAMILY MEDICINE | Facility: CLINIC | Age: 67
End: 2022-07-05
Payer: MEDICARE

## 2022-07-05 DIAGNOSIS — Z01.818 PRE-OP EXAMINATION: Primary | ICD-10-CM

## 2022-07-05 DIAGNOSIS — Z01.818 PRE-OP TESTING: ICD-10-CM

## 2022-07-05 PROCEDURE — 93005 ELECTROCARDIOGRAM TRACING: CPT | Mod: S$GLB,,, | Performed by: ANESTHESIOLOGY

## 2022-07-05 PROCEDURE — 93010 EKG 12-LEAD: ICD-10-PCS | Mod: S$GLB,,, | Performed by: INTERNAL MEDICINE

## 2022-07-05 PROCEDURE — 93005 EKG 12-LEAD: ICD-10-PCS | Mod: S$GLB,,, | Performed by: ANESTHESIOLOGY

## 2022-07-05 PROCEDURE — 93010 ELECTROCARDIOGRAM REPORT: CPT | Mod: S$GLB,,, | Performed by: INTERNAL MEDICINE

## 2022-07-05 NOTE — TELEPHONE ENCOUNTER
Called patient to see if she can come in this week to get a pre op clearance done for surgery pre Dr. Flores. Patient was scheduled on Dr. Griffin schedule on 7/6/22 at 1:40 pm. Dr. Flores wanted to get the patient scheduled per the nurse messages below. The patient said she is scheduled to get her EKG done tomorrow. Patient said the doctors office didn't give her any paperwork. Just wanted to inform you.

## 2022-07-05 NOTE — TELEPHONE ENCOUNTER
She didn't say but I would think she would because usually with pre op clearances she send them to get labs and EKG.

## 2022-07-06 ENCOUNTER — HOSPITAL ENCOUNTER (OUTPATIENT)
Dept: RADIOLOGY | Facility: HOSPITAL | Age: 67
Discharge: HOME | End: 2022-07-06
Attending: FAMILY MEDICINE
Payer: MEDICARE

## 2022-07-06 ENCOUNTER — OFFICE VISIT (OUTPATIENT)
Dept: FAMILY MEDICINE | Facility: CLINIC | Age: 67
End: 2022-07-06
Payer: MEDICARE

## 2022-07-06 VITALS
WEIGHT: 93.5 LBS | DIASTOLIC BLOOD PRESSURE: 68 MMHG | BODY MASS INDEX: 18.85 KG/M2 | OXYGEN SATURATION: 98 % | SYSTOLIC BLOOD PRESSURE: 118 MMHG | HEART RATE: 76 BPM | HEIGHT: 59 IN | TEMPERATURE: 98 F

## 2022-07-06 DIAGNOSIS — K11.8 PAROTID MASS: ICD-10-CM

## 2022-07-06 DIAGNOSIS — Z01.818 PRE-OP EXAMINATION: Primary | ICD-10-CM

## 2022-07-06 DIAGNOSIS — Z01.818 PRE-OP EXAMINATION: ICD-10-CM

## 2022-07-06 PROCEDURE — 1101F PT FALLS ASSESS-DOCD LE1/YR: CPT | Mod: CPTII,S$GLB,, | Performed by: FAMILY MEDICINE

## 2022-07-06 PROCEDURE — 3288F PR FALLS RISK ASSESSMENT DOCUMENTED: ICD-10-PCS | Mod: CPTII,S$GLB,, | Performed by: FAMILY MEDICINE

## 2022-07-06 PROCEDURE — 99214 OFFICE O/P EST MOD 30 MIN: CPT | Mod: S$GLB,,, | Performed by: FAMILY MEDICINE

## 2022-07-06 PROCEDURE — 3078F PR MOST RECENT DIASTOLIC BLOOD PRESSURE < 80 MM HG: ICD-10-PCS | Mod: CPTII,S$GLB,, | Performed by: FAMILY MEDICINE

## 2022-07-06 PROCEDURE — 1126F PR PAIN SEVERITY QUANTIFIED, NO PAIN PRESENT: ICD-10-PCS | Mod: CPTII,S$GLB,, | Performed by: FAMILY MEDICINE

## 2022-07-06 PROCEDURE — 3008F PR BODY MASS INDEX (BMI) DOCUMENTED: ICD-10-PCS | Mod: CPTII,S$GLB,, | Performed by: FAMILY MEDICINE

## 2022-07-06 PROCEDURE — 4010F ACE/ARB THERAPY RXD/TAKEN: CPT | Mod: CPTII,S$GLB,, | Performed by: FAMILY MEDICINE

## 2022-07-06 PROCEDURE — 1126F AMNT PAIN NOTED NONE PRSNT: CPT | Mod: CPTII,S$GLB,, | Performed by: FAMILY MEDICINE

## 2022-07-06 PROCEDURE — 3288F FALL RISK ASSESSMENT DOCD: CPT | Mod: CPTII,S$GLB,, | Performed by: FAMILY MEDICINE

## 2022-07-06 PROCEDURE — 71046 XR CHEST PA AND LATERAL: ICD-10-PCS | Mod: 26,,, | Performed by: RADIOLOGY

## 2022-07-06 PROCEDURE — 99999 PR PBB SHADOW E&M-EST. PATIENT-LVL III: CPT | Mod: PBBFAC,,, | Performed by: FAMILY MEDICINE

## 2022-07-06 PROCEDURE — 71046 X-RAY EXAM CHEST 2 VIEWS: CPT | Mod: TC,FY

## 2022-07-06 PROCEDURE — 1101F PR PT FALLS ASSESS DOC 0-1 FALLS W/OUT INJ PAST YR: ICD-10-PCS | Mod: CPTII,S$GLB,, | Performed by: FAMILY MEDICINE

## 2022-07-06 PROCEDURE — 3074F SYST BP LT 130 MM HG: CPT | Mod: CPTII,S$GLB,, | Performed by: FAMILY MEDICINE

## 2022-07-06 PROCEDURE — 1159F MED LIST DOCD IN RCRD: CPT | Mod: CPTII,S$GLB,, | Performed by: FAMILY MEDICINE

## 2022-07-06 PROCEDURE — 3074F PR MOST RECENT SYSTOLIC BLOOD PRESSURE < 130 MM HG: ICD-10-PCS | Mod: CPTII,S$GLB,, | Performed by: FAMILY MEDICINE

## 2022-07-06 PROCEDURE — 99214 PR OFFICE/OUTPT VISIT, EST, LEVL IV, 30-39 MIN: ICD-10-PCS | Mod: S$GLB,,, | Performed by: FAMILY MEDICINE

## 2022-07-06 PROCEDURE — 3078F DIAST BP <80 MM HG: CPT | Mod: CPTII,S$GLB,, | Performed by: FAMILY MEDICINE

## 2022-07-06 PROCEDURE — 3044F HG A1C LEVEL LT 7.0%: CPT | Mod: CPTII,S$GLB,, | Performed by: FAMILY MEDICINE

## 2022-07-06 PROCEDURE — 3044F PR MOST RECENT HEMOGLOBIN A1C LEVEL <7.0%: ICD-10-PCS | Mod: CPTII,S$GLB,, | Performed by: FAMILY MEDICINE

## 2022-07-06 PROCEDURE — 3008F BODY MASS INDEX DOCD: CPT | Mod: CPTII,S$GLB,, | Performed by: FAMILY MEDICINE

## 2022-07-06 PROCEDURE — 71046 X-RAY EXAM CHEST 2 VIEWS: CPT | Mod: 26,,, | Performed by: RADIOLOGY

## 2022-07-06 PROCEDURE — 1159F PR MEDICATION LIST DOCUMENTED IN MEDICAL RECORD: ICD-10-PCS | Mod: CPTII,S$GLB,, | Performed by: FAMILY MEDICINE

## 2022-07-06 PROCEDURE — 4010F PR ACE/ARB THEARPY RXD/TAKEN: ICD-10-PCS | Mod: CPTII,S$GLB,, | Performed by: FAMILY MEDICINE

## 2022-07-06 PROCEDURE — 99999 PR PBB SHADOW E&M-EST. PATIENT-LVL III: ICD-10-PCS | Mod: PBBFAC,,, | Performed by: FAMILY MEDICINE

## 2022-07-06 NOTE — PROGRESS NOTES
(Portions of this note were dictated using voice recognition software and may contain dictation related errors in spelling/grammar/syntax not found on text review)    CC:   Chief Complaint   Patient presents with    Pre-op Exam       HPI: 66 y.o. female, pt of Dr Flores, presented for preop examination.  She has medical history significant for anxiety and depression, hypertension, chronic bilateral low back pain, hyperlipidemia, memory loss, seizures.  She is scheduled to have parotid gland excision by ENT, , on 7/8/2022 for parotid mass. She had EKG done yesterday. She denies cough, SOB, chest pain, N/V, abdominal pain, changes in bowel habits, urine problems including burning and dysurea.     Past Medical History:   Diagnosis Date    Anxiety and depression 3/21/2018    Benign essential hypertension 3/21/2018    Chronic bilateral low back pain without sciatica 3/21/2018    History of stroke 3/21/2018    Memory loss     Mixed hyperlipidemia 3/21/2018    Seizures     Tobacco abuse 3/21/2018       Past Surgical History:   Procedure Laterality Date    ANTERIOR VAGINAL REPAIR      CATARACT EXTRACTION Right 10/16/2007    Dr. Ramsey //yag 5/1/18 Dr. Bentley    CATARACT EXTRACTION W/  INTRAOCULAR LENS IMPLANT Left 10/30/2007    Dr. Ramsey// yag 5/1/18 Dr. Bentley    CHOLECYSTECTOMY      COLONOSCOPY N/A 4/19/2018    Procedure: COLONOSCOPY;  Surgeon: Yi Goncalves MD;  Location: [facility] ENDO;  Service: Endoscopy;  Laterality: N/A;    EYE SURGERY      ROBOT-ASSISTED LAPAROSCOPIC REPAIR OF VENTRAL HERNIA N/A 6/28/2021    Procedure: ROBOTIC REPAIR, HERNIA, VENTRAL;  Surgeon: Alli Dumont Jr., MD;  Location: [facility] OR;  Service: General;  Laterality: N/A;    TONSILLECTOMY      UMBILICAL HERNIA REPAIR N/A 2/8/2019    Procedure: REPAIR, HERNIA, UMBILICAL, AGE 5 YEARS OR OLDER, open with/without mesh;  Surgeon: Magno Mishra MD;  Location: [address];  Service: General;  Laterality: N/A;        Family History   Problem Relation Age of Onset    Blindness Neg Hx     Glaucoma Neg Hx     Macular degeneration Neg Hx     Retinal detachment Neg Hx        Social History     Tobacco Use    Smoking status: Light Tobacco Smoker     Types: Cigarettes     Last attempt to quit: 5/5/2019     Years since quitting: 3.1    Smokeless tobacco: Never Used    Tobacco comment: started smoking again in 12/2020(when nervous/bored)   Substance Use Topics    Alcohol use: Yes     Comment: 1-2 drinks daily    Drug use: Yes     Types: Marijuana     Comment: intermittent to take the edge off of her anxiety       Lab Results   Component Value Date    WBC 12.34 04/21/2022    HGB 13.8 04/21/2022    HCT 39.6 04/21/2022    MCV 92 04/21/2022     04/21/2022    CHOL 122 04/20/2022    TRIG 180 (H) 04/20/2022    HDL 53 04/20/2022    ALT 20 04/20/2022    AST 25 04/20/2022    BILITOT 1.3 (H) 04/20/2022    ALKPHOS 89 04/20/2022     04/21/2022    K 4.1 04/21/2022     04/21/2022    CREATININE 0.7 04/21/2022    ESTGFRAFRICA >60 04/21/2022    EGFRNONAA >60 04/21/2022    CALCIUM 9.5 04/21/2022    ALBUMIN 4.2 04/20/2022    BUN 9 04/21/2022    CO2 23 04/21/2022    TSH 2.755 04/20/2022    INR 1.0 03/23/2011    HGBA1C 5.2 04/20/2022    LDLCALC 33.0 (L) 04/20/2022    GLU 97 04/21/2022    KYBCELSV13UT 66 09/13/2021             Vital signs reviewed  PE:   APPEARANCE: Well nourished, well developed, in no acute distress.    HEAD: Normocephalic, atraumatic.  EYES: EOMI.  Conjunctivae noninjected.  NOSE: Mucosa pink. Airway clear.  MOUTH & THROAT: No tonsillar enlargement. No pharyngeal erythema or exudate.   NECK: Supple with no cervical lymphadenopathy.    CHEST: Good inspiratory effort. Lungs clear to auscultation with no wheezes or crackles.  CARDIOVASCULAR: Normal S1, S2. No rubs, murmurs, or gallops.  ABDOMEN: Bowel sounds normal. Not distended. Soft. No tenderness or masses. No organomegaly.  EXTREMITIES: No edema,  cyanosis, or clubbing.    Review of Systems   Constitutional: Negative for chills, fatigue and fever.   HENT: Negative for nasal congestion, ear discharge, ear pain, facial swelling, mouth dryness, mouth sores, postnasal drip, rhinorrhea, sinus pressure/congestion and voice change.    Respiratory: Negative for cough, shortness of breath and wheezing.    Cardiovascular: Negative for chest pain, palpitations and leg swelling.   Gastrointestinal: Negative for abdominal pain, change in bowel habit, constipation, diarrhea, nausea, vomiting, reflux and change in bowel habit.   Genitourinary: Negative.    Musculoskeletal: Negative.    Neurological: Negative.    Psychiatric/Behavioral: Negative.    All other systems reviewed and are negative.      IMPRESSION  1. Pre-op examination    2. Parotid mass            PLAN      1. Pre-op examination  - CBC Auto Differential; Future  - Comprehensive Metabolic Panel; Future  - X-Ray Chest PA And Lateral; Future  EKG done yesterday reviewed    ACC/AHA 2007 Guidelines for clearance    Step 1: No need for emergency non cardiac surgery  Step 2: No active cardiac conditions  Step 3: No low risk surgery  Step4: Yes - Functional capacity greater than or equal to 4 METs without symptoms - YES - Class IIa, GIANCARLO B - Proceed with surgery    She is Low to intermediate risk for surgery. Her medical conditions are optimized and she can proceed to scheduled surgery.         2. Parotid mass  Schedule parotid gland excision on 7/8         Age/demographic appropriate health maintenance:    Health Maintenance Due   Topic Date Due    Pneumococcal Vaccines (Age 65+) (2 - PCV) 10/05/2019    COVID-19 Vaccine (4 - Booster for Moderna series) 06/26/2022    Mammogram  07/16/2022           Jennifer Griffin  7/6/2022

## 2022-07-07 ENCOUNTER — TELEPHONE (OUTPATIENT)
Dept: OTOLARYNGOLOGY | Facility: CLINIC | Age: 67
End: 2022-07-07
Payer: MEDICARE

## 2022-07-07 ENCOUNTER — ANESTHESIA EVENT (OUTPATIENT)
Dept: SURGERY | Facility: HOSPITAL | Age: 67
End: 2022-07-07
Payer: MEDICARE

## 2022-07-07 NOTE — ANESTHESIA PREPROCEDURE EVALUATION
[facility]  Anesthesia Pre-Operative Evaluation         Patient Name: Gena Lui  YOB: 1955  MRN: [id number]    SUBJECTIVE:     Pre-operative evaluation for Procedure(s) (LRB):  EXCISION, PAROTID GLAND (Left)     07/07/2022    Gena Lui is a 66 y.o. female w/ a significant PMHx of anxiety/depression, HLD, HTN, tobacco abuse, seiuzure disorder, memory loss, prior CVA with no residual deficits (before 2008), and left parotid mass.    Patient now presents for the above procedure.      Prev airway (6/28/2021):    Mask Ventilation:  Easy mask    Attempts:  1    Attempted By:  CRNA    Method of Intubation:  Video laryngoscopy    Blade:  Vu 3    Laryngeal View Grade: Grade I - full view of chords      Difficult Airway Encountered?: No      Complications:  None    Airway Device:  Oral endotracheal tube    Airway Device Size:  7.0       Patient Active Problem List   Diagnosis    Anxiety and depression    Benign essential hypertension    Mixed hyperlipidemia    History of stroke    Chronic bilateral low back pain without sciatica    Long-term use of aspirin therapy    Former smoker    History of seizures    Postmenopausal osteoporosis    Vitamin D deficiency    History of alcohol abuse    H/O umbilical hernia repair    Abnormal TSH    History of squamous cell carcinoma of skin    Trichomonal vaginitis    Wears dentures    Poor dentition    Parotid mass       Review of patient's allergies indicates:   Allergen Reactions    Versed [midazolam]      IV Versed altered mental status       Current Inpatient Medications:      No current facility-administered medications on file prior to encounter.     Current Outpatient Medications on File Prior to Encounter   Medication Sig Dispense Refill    atorvastatin (LIPITOR) 80 MG tablet TAKE 1 TABLET BY MOUTH EVERY DAY (Patient taking differently: Take 80 mg by mouth once daily.) 90 tablet 3    ergocalciferol  (ERGOCALCIFEROL) 50,000 unit Cap TAKE ONE CAPSULE BY MOUTH ONE TIME PER WEEK (Patient taking differently: Take 50,000 Units by mouth every Sunday.) 12 capsule 6    levETIRAcetam (KEPPRA) 500 MG Tab TAKE 1 TABLET BY MOUTH TWICE A DAY (Patient taking differently: Take 500 mg by mouth 2 (two) times daily. DO NOT CRUSH OR CHEW; SWALLOW WHOLE.) 180 tablet 4    lisinopriL 10 MG tablet Take 1 tablet (10 mg total) by mouth once daily. 90 tablet 3    multivit-min/iron/folic/lutein (MULTIVITAMIN WOMEN 50 PLUS ORAL) Take 1 tablet by mouth once daily.      ALPRAZolam (XANAX) 0.5 MG tablet TAKE 1 TABLET BY MOUTH EVERY DAY AS NEEDED FOR INSOMNIA OR ANXIETY (PANIC ATTACK) (Patient taking differently: Take 0.5 mg by mouth daily as needed for Insomnia or Anxiety (or panic attack).) 30 tablet 2    aspirin 81 MG Chew Take 81 mg by mouth every evening.       mirtazapine (REMERON) 7.5 MG Tab Take 1-2 tabs nightly for help with insomnia and appetite stimulation 30 tablet 11    sars-cov-2, covid-19, (MODERNA COVID-19) 100 mcg/0.5 ml injection          Past Surgical History:   Procedure Laterality Date    ANTERIOR VAGINAL REPAIR      CATARACT EXTRACTION Right 10/16/2007    Dr. Ramsey //yag 5/1/18 Dr. Bentley    CATARACT EXTRACTION W/  INTRAOCULAR LENS IMPLANT Left 10/30/2007    Dr. Ramsey// yag 5/1/18 Dr. Bentley    CHOLECYSTECTOMY      COLONOSCOPY N/A 4/19/2018    Procedure: COLONOSCOPY;  Surgeon: Yi Goncalves MD;  Location: [facility] ENDO;  Service: Endoscopy;  Laterality: N/A;    EYE SURGERY      ROBOT-ASSISTED LAPAROSCOPIC REPAIR OF VENTRAL HERNIA N/A 6/28/2021    Procedure: ROBOTIC REPAIR, HERNIA, VENTRAL;  Surgeon: Alli Dumont Jr., MD;  Location: [facility] OR;  Service: General;  Laterality: N/A;    TONSILLECTOMY      UMBILICAL HERNIA REPAIR N/A 2/8/2019    Procedure: REPAIR, HERNIA, UMBILICAL, AGE 5 YEARS OR OLDER, open with/without mesh;  Surgeon: Magno Mishra MD;  Location: [address];  Service:  General;  Laterality: N/A;       Social History     Socioeconomic History    Marital status:    Tobacco Use    Smoking status: Light Tobacco Smoker     Types: Cigarettes     Last attempt to quit: 5/5/2019     Years since quitting: 3.1    Smokeless tobacco: Never Used    Tobacco comment: started smoking again in 12/2020(when nervous/bored)   Substance and Sexual Activity    Alcohol use: Yes     Comment: 1-2 drinks daily    Drug use: Yes     Types: Marijuana     Comment: intermittent to take the edge off of her anxiety    Sexual activity: Never       OBJECTIVE:     Vital Signs Range (Last 24H):         Significant Labs:  Lab Results   Component Value Date    WBC 7.70 07/06/2022    HGB 13.4 07/06/2022    HCT 40.6 07/06/2022     07/06/2022    CHOL 122 04/20/2022    TRIG 180 (H) 04/20/2022    HDL 53 04/20/2022    ALT 13 07/06/2022    AST 19 07/06/2022     07/06/2022    K 3.7 07/06/2022     07/06/2022    CREATININE 0.7 07/06/2022    BUN 9 07/06/2022    CO2 28 07/06/2022    TSH 2.755 04/20/2022    INR 1.0 03/23/2011    HGBA1C 5.2 04/20/2022       Diagnostic Studies: No relevant studies.    EKG:   Results for orders placed or performed during the hospital encounter of 07/05/22   EKG 12-lead    Collection Time: 07/05/22  3:36 PM    Narrative    Test Reason : Z01.818,    Vent. Rate : 072 BPM     Atrial Rate : 072 BPM     P-R Int : 176 ms          QRS Dur : 076 ms      QT Int : 412 ms       P-R-T Axes : 079 088 066 degrees     QTc Int : 451 ms    Normal sinus rhythm  Borderline Abnormal ECG  When compared with ECG of 28-JUN-2021 13:13,  No significant change was found  Confirmed by Chidi Locke MD ([id number]) on 7/6/2022 9:29:33 AM    Referred By: ANDRESSA SAHNI           Confirmed By:Chidi Locke MD       2D ECHO:  TTE:  No results found for this or any previous visit.    CHEN:  No results found for this or any previous visit.    ASSESSMENT/PLAN:             07/07/2022  Gena Lui is a 66  y.o., female.      Pre-op Assessment    I have reviewed the Patient Summary Reports.     I have reviewed the Nursing Notes. I have reviewed the NPO Status.   I have reviewed the Medications.     Review of Systems  Anesthesia Hx:  No problems with previous Anesthesia Denies Hx of Anesthetic complications  History of prior surgery of interest to airway management or planning: Previous anesthesia: General Denies Family Hx of Anesthesia complications.   Denies Personal Hx of Anesthesia complications.   Social:  Smoker    Cardiovascular:   Exercise tolerance: good Hypertension hyperlipidemia    Pulmonary:  Pulmonary Normal    Renal/:  Renal/ Normal     Hepatic/GI:  Hepatic/GI Normal    Neurological:   CVA, no residual symptoms Seizures    Endocrine:  Endocrine Normal    Psych:   anxiety          Physical Exam  General: Well nourished, Cooperative and Alert    Airway:  Mallampati: II   Mouth Opening: Normal  TM Distance: Normal  Tongue: Normal  Neck: Abnormal Mass    Dental:  Periodontal disease, Partial Dentures        Anesthesia Plan  Type of Anesthesia, risks & benefits discussed:    Anesthesia Type: Gen ETT  Intra-op Monitoring Plan: Standard ASA Monitors  Post Op Pain Control Plan: multimodal analgesia and IV/PO Opioids PRN  Induction:  IV  Airway Plan: Video, Post-Induction  Informed Consent: Informed consent signed with the Patient and all parties understand the risks and agree with anesthesia plan.  All questions answered.   ASA Score: 3  Day of Surgery Review of History & Physical: H&P Update referred to the surgeon/provider.I have interviewed and examined the patient. I have reviewed the patient's H&P dated: There are no significant changes.     Ready For Surgery From Anesthesia Perspective.     .

## 2022-07-08 ENCOUNTER — ANESTHESIA (OUTPATIENT)
Dept: SURGERY | Facility: HOSPITAL | Age: 67
End: 2022-07-08
Payer: MEDICARE

## 2022-07-08 ENCOUNTER — HOSPITAL ENCOUNTER (OUTPATIENT)
Facility: HOSPITAL | Age: 67
Discharge: HOME | End: 2022-07-08
Attending: OTOLARYNGOLOGY | Admitting: OTOLARYNGOLOGY
Payer: MEDICARE

## 2022-07-08 VITALS
RESPIRATION RATE: 18 BRPM | HEART RATE: 71 BPM | TEMPERATURE: 97 F | BODY MASS INDEX: 18.85 KG/M2 | WEIGHT: 93.5 LBS | HEIGHT: 59 IN | SYSTOLIC BLOOD PRESSURE: 132 MMHG | DIASTOLIC BLOOD PRESSURE: 72 MMHG | OXYGEN SATURATION: 96 %

## 2022-07-08 DIAGNOSIS — M79.89 MASS OF SOFT TISSUE OF NECK: ICD-10-CM

## 2022-07-08 DIAGNOSIS — K11.8 PAROTID MASS: Primary | ICD-10-CM

## 2022-07-08 PROCEDURE — 88305 TISSUE EXAM BY PATHOLOGIST: ICD-10-PCS | Mod: 26,,, | Performed by: PATHOLOGY

## 2022-07-08 PROCEDURE — 88341 PR IHC OR ICC EACH ADD'L SINGLE ANTIBODY  STAINPR: ICD-10-PCS | Mod: 26,,, | Performed by: PATHOLOGY

## 2022-07-08 PROCEDURE — 63600175 PHARM REV CODE 636 W HCPCS: Performed by: OTOLARYNGOLOGY

## 2022-07-08 PROCEDURE — 71000016 HC POSTOP RECOV ADDL HR: Performed by: OTOLARYNGOLOGY

## 2022-07-08 PROCEDURE — 25000003 PHARM REV CODE 250

## 2022-07-08 PROCEDURE — D9220A PRA ANESTHESIA: Mod: ,,, | Performed by: ANESTHESIOLOGY

## 2022-07-08 PROCEDURE — 88341 IMHCHEM/IMCYTCHM EA ADD ANTB: CPT | Mod: 26,,, | Performed by: PATHOLOGY

## 2022-07-08 PROCEDURE — 27201423 OPTIME MED/SURG SUP & DEVICES STERILE SUPPLY: Performed by: OTOLARYNGOLOGY

## 2022-07-08 PROCEDURE — 42410 EXCISE PAROTID GLAND/LESION: CPT | Mod: LT,,, | Performed by: OTOLARYNGOLOGY

## 2022-07-08 PROCEDURE — 88342 IMHCHEM/IMCYTCHM 1ST ANTB: CPT | Performed by: PATHOLOGY

## 2022-07-08 PROCEDURE — 37000008 HC ANESTHESIA 1ST 15 MINUTES: Performed by: OTOLARYNGOLOGY

## 2022-07-08 PROCEDURE — 25000003 PHARM REV CODE 250: Performed by: OTOLARYNGOLOGY

## 2022-07-08 PROCEDURE — 88305 TISSUE EXAM BY PATHOLOGIST: CPT | Mod: 26,,, | Performed by: PATHOLOGY

## 2022-07-08 PROCEDURE — 71000015 HC POSTOP RECOV 1ST HR: Performed by: OTOLARYNGOLOGY

## 2022-07-08 PROCEDURE — 63600175 PHARM REV CODE 636 W HCPCS

## 2022-07-08 PROCEDURE — C1729 CATH, DRAINAGE: HCPCS | Performed by: OTOLARYNGOLOGY

## 2022-07-08 PROCEDURE — 71000044 HC DOSC ROUTINE RECOVERY FIRST HOUR: Performed by: OTOLARYNGOLOGY

## 2022-07-08 PROCEDURE — D9220A PRA ANESTHESIA: ICD-10-PCS | Mod: ,,, | Performed by: ANESTHESIOLOGY

## 2022-07-08 PROCEDURE — 88331 PATH CONSLTJ SURG 1 BLK 1SPC: CPT | Mod: 26,,, | Performed by: PATHOLOGY

## 2022-07-08 PROCEDURE — 42410 PR EXC PAROTID TUMOR/GLAND, LAT LOBE, W/O NERVE DISS: ICD-10-PCS | Mod: LT,,, | Performed by: OTOLARYNGOLOGY

## 2022-07-08 PROCEDURE — 88342 IMHCHEM/IMCYTCHM 1ST ANTB: CPT | Mod: 26,,, | Performed by: PATHOLOGY

## 2022-07-08 PROCEDURE — 37000009 HC ANESTHESIA EA ADD 15 MINS: Performed by: OTOLARYNGOLOGY

## 2022-07-08 PROCEDURE — 36000708 HC OR TIME LEV III 1ST 15 MIN: Performed by: OTOLARYNGOLOGY

## 2022-07-08 PROCEDURE — 88331 PR  PATH CONSULT IN SURG,W FRZ SEC: ICD-10-PCS | Mod: 26,,, | Performed by: PATHOLOGY

## 2022-07-08 PROCEDURE — 36000709 HC OR TIME LEV III EA ADD 15 MIN: Performed by: OTOLARYNGOLOGY

## 2022-07-08 PROCEDURE — 88305 TISSUE EXAM BY PATHOLOGIST: CPT | Performed by: PATHOLOGY

## 2022-07-08 PROCEDURE — 88342 CHG IMMUNOCYTOCHEMISTRY: ICD-10-PCS | Mod: 26,,, | Performed by: PATHOLOGY

## 2022-07-08 PROCEDURE — 88331 PATH CONSLTJ SURG 1 BLK 1SPC: CPT | Performed by: PATHOLOGY

## 2022-07-08 PROCEDURE — 88341 IMHCHEM/IMCYTCHM EA ADD ANTB: CPT | Mod: 59 | Performed by: PATHOLOGY

## 2022-07-08 RX ORDER — LIDOCAINE HYDROCHLORIDE AND EPINEPHRINE 10; 10 MG/ML; UG/ML
INJECTION, SOLUTION INFILTRATION; PERINEURAL
Status: DISCONTINUED | OUTPATIENT
Start: 2022-07-08 | End: 2022-07-08 | Stop reason: HOSPADM

## 2022-07-08 RX ORDER — SUCCINYLCHOLINE CHLORIDE 20 MG/ML
INJECTION INTRAMUSCULAR; INTRAVENOUS
Status: DISCONTINUED | OUTPATIENT
Start: 2022-07-08 | End: 2022-07-08

## 2022-07-08 RX ORDER — KETAMINE HCL IN 0.9 % NACL 50 MG/5 ML
SYRINGE (ML) INTRAVENOUS
Status: DISCONTINUED | OUTPATIENT
Start: 2022-07-08 | End: 2022-07-08

## 2022-07-08 RX ORDER — SODIUM CHLORIDE 0.9 % (FLUSH) 0.9 %
10 SYRINGE (ML) INJECTION
Status: DISCONTINUED | OUTPATIENT
Start: 2022-07-08 | End: 2022-07-08 | Stop reason: HOSPADM

## 2022-07-08 RX ORDER — PHENYLEPHRINE HCL IN 0.9% NACL 1 MG/10 ML
SYRINGE (ML) INTRAVENOUS
Status: DISCONTINUED | OUTPATIENT
Start: 2022-07-08 | End: 2022-07-08

## 2022-07-08 RX ORDER — PROPOFOL 10 MG/ML
VIAL (ML) INTRAVENOUS CONTINUOUS PRN
Status: DISCONTINUED | OUTPATIENT
Start: 2022-07-08 | End: 2022-07-08

## 2022-07-08 RX ORDER — REMIFENTANIL HYDROCHLORIDE 1 MG/ML
INJECTION, POWDER, LYOPHILIZED, FOR SOLUTION INTRAVENOUS CONTINUOUS PRN
Status: DISCONTINUED | OUTPATIENT
Start: 2022-07-08 | End: 2022-07-08

## 2022-07-08 RX ORDER — HYDROCODONE BITARTRATE AND ACETAMINOPHEN 5; 325 MG/1; MG/1
1 TABLET ORAL EVERY 6 HOURS PRN
Qty: 15 TABLET | Refills: 0 | OUTPATIENT
Start: 2022-07-08 | End: 2022-09-03

## 2022-07-08 RX ORDER — ROCURONIUM BROMIDE 10 MG/ML
INJECTION, SOLUTION INTRAVENOUS
Status: DISCONTINUED | OUTPATIENT
Start: 2022-07-08 | End: 2022-07-08

## 2022-07-08 RX ORDER — HALOPERIDOL 5 MG/ML
0.5 INJECTION INTRAMUSCULAR EVERY 10 MIN PRN
Status: DISCONTINUED | OUTPATIENT
Start: 2022-07-08 | End: 2022-07-08 | Stop reason: HOSPADM

## 2022-07-08 RX ORDER — ONDANSETRON 8 MG/1
8 TABLET, ORALLY DISINTEGRATING ORAL EVERY 6 HOURS PRN
Qty: 15 TABLET | Refills: 0 | Status: SHIPPED | OUTPATIENT
Start: 2022-07-08 | End: 2022-09-03

## 2022-07-08 RX ORDER — CEFAZOLIN SODIUM/WATER 2 G/20 ML
2 SYRINGE (ML) INTRAVENOUS
Status: COMPLETED | OUTPATIENT
Start: 2022-07-08 | End: 2022-07-08

## 2022-07-08 RX ORDER — FENTANYL CITRATE 50 UG/ML
INJECTION, SOLUTION INTRAMUSCULAR; INTRAVENOUS
Status: DISCONTINUED | OUTPATIENT
Start: 2022-07-08 | End: 2022-07-08

## 2022-07-08 RX ORDER — PROPOFOL 10 MG/ML
VIAL (ML) INTRAVENOUS
Status: DISCONTINUED | OUTPATIENT
Start: 2022-07-08 | End: 2022-07-08

## 2022-07-08 RX ORDER — LIDOCAINE HYDROCHLORIDE 10 MG/ML
1 INJECTION, SOLUTION EPIDURAL; INFILTRATION; INTRACAUDAL; PERINEURAL ONCE
Status: DISCONTINUED | OUTPATIENT
Start: 2022-07-08 | End: 2022-07-08 | Stop reason: HOSPADM

## 2022-07-08 RX ORDER — ONDANSETRON 2 MG/ML
INJECTION INTRAMUSCULAR; INTRAVENOUS
Status: DISCONTINUED | OUTPATIENT
Start: 2022-07-08 | End: 2022-07-08

## 2022-07-08 RX ORDER — HYDROMORPHONE HYDROCHLORIDE 1 MG/ML
0.2 INJECTION, SOLUTION INTRAMUSCULAR; INTRAVENOUS; SUBCUTANEOUS EVERY 5 MIN PRN
Status: DISCONTINUED | OUTPATIENT
Start: 2022-07-08 | End: 2022-07-08 | Stop reason: HOSPADM

## 2022-07-08 RX ORDER — DEXAMETHASONE SODIUM PHOSPHATE 4 MG/ML
INJECTION, SOLUTION INTRA-ARTICULAR; INTRALESIONAL; INTRAMUSCULAR; INTRAVENOUS; SOFT TISSUE
Status: DISCONTINUED | OUTPATIENT
Start: 2022-07-08 | End: 2022-07-08

## 2022-07-08 RX ORDER — LIDOCAINE HYDROCHLORIDE 20 MG/ML
INJECTION, SOLUTION EPIDURAL; INFILTRATION; INTRACAUDAL; PERINEURAL
Status: DISCONTINUED | OUTPATIENT
Start: 2022-07-08 | End: 2022-07-08

## 2022-07-08 RX ORDER — EPHEDRINE SULFATE 50 MG/ML
INJECTION, SOLUTION INTRAVENOUS
Status: DISCONTINUED | OUTPATIENT
Start: 2022-07-08 | End: 2022-07-08

## 2022-07-08 RX ADMIN — HYDROMORPHONE HYDROCHLORIDE 0.2 MG: 1 INJECTION, SOLUTION INTRAMUSCULAR; INTRAVENOUS; SUBCUTANEOUS at 12:07

## 2022-07-08 RX ADMIN — Medication 100 MCG: at 10:07

## 2022-07-08 RX ADMIN — REMIFENTANIL HYDROCHLORIDE 0.1 MCG/KG/MIN: 1 INJECTION, POWDER, LYOPHILIZED, FOR SOLUTION INTRAVENOUS at 09:07

## 2022-07-08 RX ADMIN — Medication 2 G: at 09:07

## 2022-07-08 RX ADMIN — LIDOCAINE HYDROCHLORIDE 50 MG: 20 INJECTION, SOLUTION EPIDURAL; INFILTRATION; INTRACAUDAL at 09:07

## 2022-07-08 RX ADMIN — SODIUM CHLORIDE, SODIUM GLUCONATE, SODIUM ACETATE, POTASSIUM CHLORIDE, MAGNESIUM CHLORIDE, SODIUM PHOSPHATE, DIBASIC, AND POTASSIUM PHOSPHATE: .53; .5; .37; .037; .03; .012; .00082 INJECTION, SOLUTION INTRAVENOUS at 09:07

## 2022-07-08 RX ADMIN — SUCCINYLCHOLINE CHLORIDE 100 MG: 20 INJECTION, SOLUTION INTRAMUSCULAR; INTRAVENOUS; PARENTERAL at 09:07

## 2022-07-08 RX ADMIN — ONDANSETRON 4 MG: 2 INJECTION INTRAMUSCULAR; INTRAVENOUS at 10:07

## 2022-07-08 RX ADMIN — Medication 150 MG: at 09:07

## 2022-07-08 RX ADMIN — PROPOFOL 100 MCG/KG/MIN: 10 INJECTION, EMULSION INTRAVENOUS at 09:07

## 2022-07-08 RX ADMIN — SODIUM CHLORIDE: 0.9 INJECTION, SOLUTION INTRAVENOUS at 09:07

## 2022-07-08 RX ADMIN — Medication 20 MG: at 09:07

## 2022-07-08 RX ADMIN — FENTANYL CITRATE 100 MCG: 50 INJECTION INTRAMUSCULAR; INTRAVENOUS at 09:07

## 2022-07-08 RX ADMIN — EPHEDRINE SULFATE 5 MG: 50 INJECTION INTRAVENOUS at 10:07

## 2022-07-08 RX ADMIN — DEXAMETHASONE SODIUM PHOSPHATE 4 MG: 4 INJECTION INTRA-ARTICULAR; INTRALESIONAL; INTRAMUSCULAR; INTRAVENOUS; SOFT TISSUE at 09:07

## 2022-07-08 RX ADMIN — ROCURONIUM BROMIDE 10 MG: 10 INJECTION INTRAVENOUS at 09:07

## 2022-07-08 RX ADMIN — Medication 40 MG: at 10:07

## 2022-07-08 NOTE — ANESTHESIA PROCEDURE NOTES
Intubation    Date/Time: 7/8/2022 9:35 AM  Performed by: Valente Dong MD  Authorized by: Lisa Devine MD     Intubation:     Induction:  Intravenous    Intubated:  Postinduction    Mask Ventilation:  Easy with oral airway    Attempts:  1    Attempted By:  Resident anesthesiologist    Method of Intubation:  Direct    Blade:  Fernandez 2    Laryngeal View Grade: Grade I - full view of cords      Difficult Airway Encountered?: No      Complications:  None    Airway Device:  Oral endotracheal tube    Airway Device Size:  7.0    Style/Cuff Inflation:  Cuffed (inflated to minimal occlusive pressure)    Tube secured:  22    Placement Verified By:  Capnometry    Complicating Factors:  None    Findings Post-Intubation:  BS equal bilateral and atraumatic/condition of teeth unchanged

## 2022-07-08 NOTE — BRIEF OP NOTE
[facility] Chen - Surgery ([facility])  Brief Operative Note    Surgery Date: 7/8/2022     Surgeon(s) and Role:     * Mavis Mansfield MD - Primary     * Dilip Pierce MD - Resident - Assisting        Pre-op Diagnosis:  Mass of soft tissue of neck [M79.89]    Post-op Diagnosis:  Post-Op Diagnosis Codes:     * Mass of soft tissue of neck [M79.89]    Procedure(s) (LRB):  EXCISION, PAROTID GLAND (Left)    Anesthesia: General    Operative Findings: left cystic mass abutting the tail of parotid    Estimated Blood Loss: * No values recorded between 7/8/2022  9:53 AM and 7/8/2022 10:44 AM *         Specimens:   Specimen (24h ago, onward)             Start     Ordered    07/08/22 1006  Specimen to Pathology, Surgery ENT  Once        Comments: Pre-op Diagnosis: Mass of soft tissue of neck [M79.89]Procedure(s):EXCISION, PAROTID GLAND Number of specimens: 1Name of specimens: 1) Left parotid mass - Frozen     References:    Click here for ordering Quick Tip   Question Answer Comment   Procedure Type: ENT    Specimen Class: Routine/Screening    Which provider would you like to cc? MAVIS MANSFIELD    Release to patient Immediate        07/08/22 1005                  Discharge Note    OUTCOME: Patient tolerated treatment/procedure well without complication and is now ready for discharge.    DISPOSITION: Home or Self Care    FINAL DIAGNOSIS:  Parotid mass    FOLLOWUP: In clinic    DISCHARGE INSTRUCTIONS:    Discharge Procedure Orders   Diet Adult Regular     Other restrictions (specify):   Order Comments: No heavy lifting (nothing more than 10 lbs) for 2 weeks. No strenuous activity for 2 weeks.     Notify your health care provider if you experience any of the following:  persistent nausea and vomiting or diarrhea     Notify your health care provider if you experience any of the following:  severe uncontrolled pain     Notify your health care provider if you experience any of the following:  redness, tenderness, or signs of  infection (pain, swelling, redness, odor or green/yellow discharge around incision site)     No dressing needed   Order Comments: Ok to shower and get incision wet tomorrow. Dermabond dressing will peel off on its own over time. Do not scrub incision. Do not submerge incision in water (no baths/swimming).

## 2022-07-08 NOTE — TRANSFER OF CARE
"Anesthesia Transfer of Care Note    Patient: Gena Lui    Procedure(s) Performed: Procedure(s) (LRB):  EXCISION, PAROTID GLAND (Left)    Patient location: [facility]    Anesthesia Type: general    Transport from OR: Transported from OR on 6-10 L/min O2 by face mask with adequate spontaneous ventilation    Post pain: adequate analgesia    Post assessment: no apparent anesthetic complications    Post vital signs: stable    Level of consciousness: awake and alert    Nausea/Vomiting: no nausea/vomiting    Complications: none    Transfer of care protocol was followed      Last vitals:   Visit Vitals  /71   Pulse 79   Temp 36.7 °C (98 °F) (Oral)   Resp 16   Ht 4' 11" (1.499 m)   Wt 42.4 kg (93 lb 7.6 oz)   LMP  (LMP Unknown)   SpO2 100%   Breastfeeding No   BMI 18.88 kg/m²     "

## 2022-07-10 NOTE — OP NOTE
DATE OF PROCEDURE: 7/8/2022     PREOPERATIVE DIAGNOSES:   Left parotid mass    POSTOPERATIVE DIAGNOSES:   Same    SURGEON:  Surgeon(s) and Role:     * Kit Conley MD - Primary     * Dilip Pierce MD - Resident - Assisting      PROCEDURES PERFORMED:   Resection of left tail of parotid mass without identification of facial nerve    ANESTHESIA: General      INDICATIONS FOR PROCEDURE:   Gena Lui is a 66 y.o. woman who I recently evaluated for a left-sided parotid mass.  Of her the options of fine-needle aspiration versus parotidectomy.  She elected to proceed with parotidectomy.    She was apprised of the risks, benefits and alternatives to surgery.  In spite of the risk inherent to surgery,she provided informed consent for the aforementioned procedures.     PROCEDURE IN DETAIL:  The patient was taken to the operating room and placed on the operating table in the supine position.  General endotracheal anesthesia was induced by the anesthesia team.     Left neck was addressed.  Modified Jonel incision was marked in standard fashion and injected with several cc of 1% lidocaine with epinephrine.  The facial nerve monitor was attached the left face and calibrated in the standard fashion.  The face and neck were then prepped and draped in standard sterile fashion.    To begin, the inferior aspect of the incision was carried out utilizing a 15 blade.  Sharp dissection proceeded through the underlying subcutaneous tissue and platysma.  A superiorly based subplatysmal flap was then elevated up to the angle of the mandible.  The mass in question was noted along the sternocleidomastoid muscle with attachments noted to the inferior most aspect of the tail of parotid.  The lesion in question was then circumferentially dissected free of the surrounding structures.  And amputated from the tail of parotid.  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  Hemostasis was achieved electrocautery.  The wound was then closed with absorbable suture and Dermabond.  Once the wound was closed, she was handed back to Anesthesia.  She was awakened, extubated and transferred to recovery in satisfactory condition.    There were no intraoperative complications.  I was present for and participated in the entire procedure as dictated above.       ESTIMATED BLOOD LOSS:Minimal    SPECIMENS:   Specimen (24h ago, onward)            L parotid mass- frozen

## 2022-07-15 ENCOUNTER — OFFICE VISIT (OUTPATIENT)
Dept: OTOLARYNGOLOGY | Facility: CLINIC | Age: 67
End: 2022-07-15
Payer: MEDICARE

## 2022-07-15 VITALS — WEIGHT: 95 LBS | BODY MASS INDEX: 19.19 KG/M2

## 2022-07-15 DIAGNOSIS — K11.8 PAROTID MASS: Primary | ICD-10-CM

## 2022-07-15 PROCEDURE — 4010F PR ACE/ARB THEARPY RXD/TAKEN: ICD-10-PCS | Mod: CPTII,S$GLB,, | Performed by: NURSE PRACTITIONER

## 2022-07-15 PROCEDURE — 99999 PR PBB SHADOW E&M-EST. PATIENT-LVL II: CPT | Mod: PBBFAC,,, | Performed by: NURSE PRACTITIONER

## 2022-07-15 PROCEDURE — 99999 PR PBB SHADOW E&M-EST. PATIENT-LVL II: ICD-10-PCS | Mod: PBBFAC,,, | Performed by: NURSE PRACTITIONER

## 2022-07-15 PROCEDURE — 3008F BODY MASS INDEX DOCD: CPT | Mod: CPTII,S$GLB,, | Performed by: NURSE PRACTITIONER

## 2022-07-15 PROCEDURE — 99024 POSTOP FOLLOW-UP VISIT: CPT | Mod: S$GLB,,, | Performed by: NURSE PRACTITIONER

## 2022-07-15 PROCEDURE — 99024 PR POST-OP FOLLOW-UP VISIT: ICD-10-PCS | Mod: S$GLB,,, | Performed by: NURSE PRACTITIONER

## 2022-07-15 PROCEDURE — 1126F PR PAIN SEVERITY QUANTIFIED, NO PAIN PRESENT: ICD-10-PCS | Mod: CPTII,S$GLB,, | Performed by: NURSE PRACTITIONER

## 2022-07-15 PROCEDURE — 1159F MED LIST DOCD IN RCRD: CPT | Mod: CPTII,S$GLB,, | Performed by: NURSE PRACTITIONER

## 2022-07-15 PROCEDURE — 4010F ACE/ARB THERAPY RXD/TAKEN: CPT | Mod: CPTII,S$GLB,, | Performed by: NURSE PRACTITIONER

## 2022-07-15 PROCEDURE — 1126F AMNT PAIN NOTED NONE PRSNT: CPT | Mod: CPTII,S$GLB,, | Performed by: NURSE PRACTITIONER

## 2022-07-15 PROCEDURE — 3044F HG A1C LEVEL LT 7.0%: CPT | Mod: CPTII,S$GLB,, | Performed by: NURSE PRACTITIONER

## 2022-07-15 PROCEDURE — 1159F PR MEDICATION LIST DOCUMENTED IN MEDICAL RECORD: ICD-10-PCS | Mod: CPTII,S$GLB,, | Performed by: NURSE PRACTITIONER

## 2022-07-15 PROCEDURE — 3008F PR BODY MASS INDEX (BMI) DOCUMENTED: ICD-10-PCS | Mod: CPTII,S$GLB,, | Performed by: NURSE PRACTITIONER

## 2022-07-15 PROCEDURE — 3044F PR MOST RECENT HEMOGLOBIN A1C LEVEL <7.0%: ICD-10-PCS | Mod: CPTII,S$GLB,, | Performed by: NURSE PRACTITIONER

## 2022-07-15 NOTE — PROGRESS NOTES
Chief Complaint   Patient presents with    Post-op Evaluation       HPI   66 y.o. female returns for a post op visit. She has been doing well since surgery. No complaints.    Review of Systems   Constitutional: Negative for fatigue and unexpected weight change.   HENT: Per HPI.  Eyes: Negative for visual disturbance.   Respiratory: Negative for shortness of breath, hemoptysis   Cardiovascular: Negative for chest pain and palpitations.   Musculoskeletal: Negative for decreased ROM, back pain.   Skin: Negative for rash, sunburn, itching.   Neurological: Negative for dizziness and seizures.   Hematological: Negative for adenopathy. Does not bruise/bleed easily.   Endocrine: Negative for rapid weight loss/weight gain, heat/cold intolerance.     Past Medical History   Patient Active Problem List   Diagnosis    Anxiety and depression    Benign essential hypertension    Mixed hyperlipidemia    History of stroke    Chronic bilateral low back pain without sciatica    Long-term use of aspirin therapy    Former smoker    History of seizures    Postmenopausal osteoporosis    Vitamin D deficiency    History of alcohol abuse    H/O umbilical hernia repair    Abnormal TSH    History of squamous cell carcinoma of skin    Trichomonal vaginitis    Wears dentures    Poor dentition    Parotid mass           Past Surgical History   Past Surgical History:   Procedure Laterality Date    ANTERIOR VAGINAL REPAIR      CATARACT EXTRACTION Right 10/16/2007    Dr. Ramsey //yag 5/1/18 Dr. Bentley    CATARACT EXTRACTION W/  INTRAOCULAR LENS IMPLANT Left 10/30/2007    Dr. Ramsey// yag 5/1/18 Dr. Bentley    CHOLECYSTECTOMY      COLONOSCOPY N/A 4/19/2018    Procedure: COLONOSCOPY;  Surgeon: Yi Goncalves MD;  Location: [facility];  Service: Endoscopy;  Laterality: N/A;    EXCISION OF PAROTID GLAND Left 7/8/2022    Procedure: EXCISION, PAROTID GLAND;  Surgeon: Kit Conley MD;  Location: [facility] OR [address] [place] [address];  Service:  ENT;  Laterality: Left;    EYE SURGERY      ROBOT-ASSISTED LAPAROSCOPIC REPAIR OF VENTRAL HERNIA N/A 6/28/2021    Procedure: ROBOTIC REPAIR, HERNIA, VENTRAL;  Surgeon: Alli Dumont Jr., MD;  Location: [facility];  Service: General;  Laterality: N/A;    TONSILLECTOMY      UMBILICAL HERNIA REPAIR N/A 2/8/2019    Procedure: REPAIR, HERNIA, UMBILICAL, AGE 5 YEARS OR OLDER, open with/without mesh;  Surgeon: Magno Mishra MD;  Location: [address];  Service: General;  Laterality: N/A;         Family History   Family History   Problem Relation Age of Onset    Blindness Neg Hx     Glaucoma Neg Hx     Macular degeneration Neg Hx     Retinal detachment Neg Hx            Social History   .  Social History     Socioeconomic History    Marital status:    Tobacco Use    Smoking status: Light Tobacco Smoker     Types: Cigarettes     Last attempt to quit: 5/5/2019     Years since quitting: 3.1    Smokeless tobacco: Never Used    Tobacco comment: started smoking again in 12/2020(when nervous/bored)   Substance and Sexual Activity    Alcohol use: Yes     Comment: 1-2 drinks daily    Drug use: Yes     Types: Marijuana     Comment: intermittent to take the edge off of her anxiety    Sexual activity: Never         Allergies   Review of patient's allergies indicates:   Allergen Reactions    Versed [midazolam]      IV Versed altered mental status           Physical Exam     There were no vitals filed for this visit.      Body mass index is 19.19 kg/m².      General: AOx3, NAD   Respiratory:  Symmetric chest rise, normal effort  Neck: Left neck incision CDI.  No redness, drainage, or fluid collection noted.  Edges well approximated.  Face: House Brackmann I bilaterally.     MRI neck reviewed    Assessment/Plan  Problem List Items Addressed This Visit        ENT    Parotid mass - Primary     Doing well. Path pending. Questions answered. RTC in 2-3 weeks, sooner if needed.

## 2022-07-20 LAB
FINAL PATHOLOGIC DIAGNOSIS: NORMAL
FROZEN SECTION FOOTNOTE: NORMAL
GROSS: NORMAL
Lab: NORMAL

## 2022-07-27 ENCOUNTER — HOSPITAL ENCOUNTER (OUTPATIENT)
Dept: RADIOLOGY | Facility: HOSPITAL | Age: 67
Discharge: HOME | End: 2022-07-27
Attending: FAMILY MEDICINE
Payer: MEDICARE

## 2022-07-27 DIAGNOSIS — Z12.31 ENCOUNTER FOR SCREENING MAMMOGRAM FOR BREAST CANCER: ICD-10-CM

## 2022-07-27 PROCEDURE — 77063 BREAST TOMOSYNTHESIS BI: CPT | Mod: TC

## 2022-07-27 PROCEDURE — 77063 BREAST TOMOSYNTHESIS BI: CPT | Mod: 26,,, | Performed by: RADIOLOGY

## 2022-07-27 PROCEDURE — 77063 MAMMO DIGITAL SCREENING BILAT WITH TOMO: ICD-10-PCS | Mod: 26,,, | Performed by: RADIOLOGY

## 2022-07-27 PROCEDURE — 77067 SCR MAMMO BI INCL CAD: CPT | Mod: 26,,, | Performed by: RADIOLOGY

## 2022-07-27 PROCEDURE — 77067 SCR MAMMO BI INCL CAD: CPT | Mod: TC

## 2022-07-27 PROCEDURE — 77067 MAMMO DIGITAL SCREENING BILAT WITH TOMO: ICD-10-PCS | Mod: 26,,, | Performed by: RADIOLOGY

## 2022-07-27 NOTE — ANESTHESIA POSTPROCEDURE EVALUATION
Anesthesia Post Evaluation    Patient: Gena Lui    Procedure(s) Performed: Procedure(s) (LRB):  EXCISION, PAROTID GLAND (Left)    Final Anesthesia Type: general      Patient location during evaluation: PACU  Patient participation: Yes- Able to Participate  Level of consciousness: awake and alert  Post-procedure vital signs: reviewed and stable  Pain management: adequate  Airway patency: patent    PONV status at discharge: No PONV  Anesthetic complications: no      Cardiovascular status: stable  Respiratory status: unassisted and spontaneous ventilation  Hydration status: euvolemic  Follow-up not needed.          Vitals Value Taken Time   /72 07/08/22 1215   Temp 36.3 °C (97.4 °F) 07/08/22 1048   Pulse 71 07/08/22 1215   Resp 18 07/08/22 1215   SpO2 96 % 07/08/22 1215         No case tracking events are documented in the log.      Pain/Nir Score: No data recorded

## 2022-08-01 ENCOUNTER — OFFICE VISIT (OUTPATIENT)
Dept: OTOLARYNGOLOGY | Facility: CLINIC | Age: 67
End: 2022-08-01
Payer: MEDICARE

## 2022-08-01 ENCOUNTER — TELEPHONE (OUTPATIENT)
Dept: FAMILY MEDICINE | Facility: CLINIC | Age: 67
End: 2022-08-01
Payer: MEDICARE

## 2022-08-01 ENCOUNTER — OFFICE VISIT (OUTPATIENT)
Dept: FAMILY MEDICINE | Facility: CLINIC | Age: 67
End: 2022-08-01
Payer: MEDICARE

## 2022-08-01 VITALS
TEMPERATURE: 98 F | WEIGHT: 93.06 LBS | DIASTOLIC BLOOD PRESSURE: 78 MMHG | HEART RATE: 91 BPM | BODY MASS INDEX: 18.79 KG/M2 | SYSTOLIC BLOOD PRESSURE: 128 MMHG

## 2022-08-01 VITALS
BODY MASS INDEX: 18.76 KG/M2 | WEIGHT: 93.06 LBS | OXYGEN SATURATION: 98 % | HEART RATE: 91 BPM | HEIGHT: 59 IN | DIASTOLIC BLOOD PRESSURE: 78 MMHG | SYSTOLIC BLOOD PRESSURE: 128 MMHG

## 2022-08-01 DIAGNOSIS — Z97.2 WEARS DENTURES: ICD-10-CM

## 2022-08-01 DIAGNOSIS — I10 BENIGN ESSENTIAL HYPERTENSION: Primary | ICD-10-CM

## 2022-08-01 DIAGNOSIS — R79.89 ABNORMAL TSH: ICD-10-CM

## 2022-08-01 DIAGNOSIS — Z79.899 MEDICATION MANAGEMENT: ICD-10-CM

## 2022-08-01 DIAGNOSIS — F10.11 HISTORY OF ALCOHOL ABUSE: ICD-10-CM

## 2022-08-01 DIAGNOSIS — E55.9 VITAMIN D DEFICIENCY: ICD-10-CM

## 2022-08-01 DIAGNOSIS — Z79.82 LONG-TERM USE OF ASPIRIN THERAPY: ICD-10-CM

## 2022-08-01 DIAGNOSIS — F41.9 ANXIETY AND DEPRESSION: ICD-10-CM

## 2022-08-01 DIAGNOSIS — E78.2 MIXED HYPERLIPIDEMIA: ICD-10-CM

## 2022-08-01 DIAGNOSIS — Z87.898 HISTORY OF SEIZURES: ICD-10-CM

## 2022-08-01 DIAGNOSIS — Z86.73 HISTORY OF STROKE: ICD-10-CM

## 2022-08-01 DIAGNOSIS — M81.0 POSTMENOPAUSAL OSTEOPOROSIS: ICD-10-CM

## 2022-08-01 DIAGNOSIS — K11.8 PAROTID MASS: Primary | ICD-10-CM

## 2022-08-01 DIAGNOSIS — F32.A ANXIETY AND DEPRESSION: ICD-10-CM

## 2022-08-01 DIAGNOSIS — K11.8 PAROTID MASS: ICD-10-CM

## 2022-08-01 DIAGNOSIS — Z78.0 POSTMENOPAUSAL: ICD-10-CM

## 2022-08-01 DIAGNOSIS — Z72.0 TOBACCO ABUSE: ICD-10-CM

## 2022-08-01 DIAGNOSIS — H91.90 HEARING LOSS, UNSPECIFIED HEARING LOSS TYPE, UNSPECIFIED LATERALITY: ICD-10-CM

## 2022-08-01 DIAGNOSIS — K08.9 POOR DENTITION: ICD-10-CM

## 2022-08-01 PROCEDURE — 4010F PR ACE/ARB THEARPY RXD/TAKEN: ICD-10-PCS | Mod: CPTII,S$GLB,, | Performed by: NURSE PRACTITIONER

## 2022-08-01 PROCEDURE — 3078F PR MOST RECENT DIASTOLIC BLOOD PRESSURE < 80 MM HG: ICD-10-PCS | Mod: CPTII,S$GLB,, | Performed by: NURSE PRACTITIONER

## 2022-08-01 PROCEDURE — 4010F PR ACE/ARB THEARPY RXD/TAKEN: ICD-10-PCS | Mod: CPTII,S$GLB,, | Performed by: FAMILY MEDICINE

## 2022-08-01 PROCEDURE — 1126F AMNT PAIN NOTED NONE PRSNT: CPT | Mod: CPTII,S$GLB,, | Performed by: FAMILY MEDICINE

## 2022-08-01 PROCEDURE — 1126F PR PAIN SEVERITY QUANTIFIED, NO PAIN PRESENT: ICD-10-PCS | Mod: CPTII,S$GLB,, | Performed by: FAMILY MEDICINE

## 2022-08-01 PROCEDURE — 3008F BODY MASS INDEX DOCD: CPT | Mod: CPTII,S$GLB,, | Performed by: FAMILY MEDICINE

## 2022-08-01 PROCEDURE — 99999 PR PBB SHADOW E&M-EST. PATIENT-LVL V: CPT | Mod: PBBFAC,,, | Performed by: FAMILY MEDICINE

## 2022-08-01 PROCEDURE — 1159F PR MEDICATION LIST DOCUMENTED IN MEDICAL RECORD: ICD-10-PCS | Mod: CPTII,S$GLB,, | Performed by: FAMILY MEDICINE

## 2022-08-01 PROCEDURE — 1101F PR PT FALLS ASSESS DOC 0-1 FALLS W/OUT INJ PAST YR: ICD-10-PCS | Mod: CPTII,S$GLB,, | Performed by: FAMILY MEDICINE

## 2022-08-01 PROCEDURE — 3074F PR MOST RECENT SYSTOLIC BLOOD PRESSURE < 130 MM HG: ICD-10-PCS | Mod: CPTII,S$GLB,, | Performed by: FAMILY MEDICINE

## 2022-08-01 PROCEDURE — 1159F MED LIST DOCD IN RCRD: CPT | Mod: CPTII,S$GLB,, | Performed by: FAMILY MEDICINE

## 2022-08-01 PROCEDURE — 99024 PR POST-OP FOLLOW-UP VISIT: ICD-10-PCS | Mod: S$GLB,,, | Performed by: NURSE PRACTITIONER

## 2022-08-01 PROCEDURE — 3008F BODY MASS INDEX DOCD: CPT | Mod: CPTII,S$GLB,, | Performed by: NURSE PRACTITIONER

## 2022-08-01 PROCEDURE — 4010F ACE/ARB THERAPY RXD/TAKEN: CPT | Mod: CPTII,S$GLB,, | Performed by: NURSE PRACTITIONER

## 2022-08-01 PROCEDURE — 3044F HG A1C LEVEL LT 7.0%: CPT | Mod: CPTII,S$GLB,, | Performed by: FAMILY MEDICINE

## 2022-08-01 PROCEDURE — 3044F PR MOST RECENT HEMOGLOBIN A1C LEVEL <7.0%: ICD-10-PCS | Mod: CPTII,S$GLB,, | Performed by: NURSE PRACTITIONER

## 2022-08-01 PROCEDURE — 99999 PR PBB SHADOW E&M-EST. PATIENT-LVL III: CPT | Mod: PBBFAC,,, | Performed by: NURSE PRACTITIONER

## 2022-08-01 PROCEDURE — 99024 POSTOP FOLLOW-UP VISIT: CPT | Mod: S$GLB,,, | Performed by: NURSE PRACTITIONER

## 2022-08-01 PROCEDURE — 3008F PR BODY MASS INDEX (BMI) DOCUMENTED: ICD-10-PCS | Mod: CPTII,S$GLB,, | Performed by: FAMILY MEDICINE

## 2022-08-01 PROCEDURE — 1101F PT FALLS ASSESS-DOCD LE1/YR: CPT | Mod: CPTII,S$GLB,, | Performed by: FAMILY MEDICINE

## 2022-08-01 PROCEDURE — 1126F PR PAIN SEVERITY QUANTIFIED, NO PAIN PRESENT: ICD-10-PCS | Mod: CPTII,S$GLB,, | Performed by: NURSE PRACTITIONER

## 2022-08-01 PROCEDURE — 1101F PT FALLS ASSESS-DOCD LE1/YR: CPT | Mod: CPTII,S$GLB,, | Performed by: NURSE PRACTITIONER

## 2022-08-01 PROCEDURE — 99999 PR PBB SHADOW E&M-EST. PATIENT-LVL III: ICD-10-PCS | Mod: PBBFAC,,, | Performed by: NURSE PRACTITIONER

## 2022-08-01 PROCEDURE — 3074F SYST BP LT 130 MM HG: CPT | Mod: CPTII,S$GLB,, | Performed by: FAMILY MEDICINE

## 2022-08-01 PROCEDURE — 1101F PR PT FALLS ASSESS DOC 0-1 FALLS W/OUT INJ PAST YR: ICD-10-PCS | Mod: CPTII,S$GLB,, | Performed by: NURSE PRACTITIONER

## 2022-08-01 PROCEDURE — 1126F AMNT PAIN NOTED NONE PRSNT: CPT | Mod: CPTII,S$GLB,, | Performed by: NURSE PRACTITIONER

## 2022-08-01 PROCEDURE — 1160F PR REVIEW ALL MEDS BY PRESCRIBER/CLIN PHARMACIST DOCUMENTED: ICD-10-PCS | Mod: CPTII,S$GLB,, | Performed by: FAMILY MEDICINE

## 2022-08-01 PROCEDURE — 3078F DIAST BP <80 MM HG: CPT | Mod: CPTII,S$GLB,, | Performed by: NURSE PRACTITIONER

## 2022-08-01 PROCEDURE — 3288F FALL RISK ASSESSMENT DOCD: CPT | Mod: CPTII,S$GLB,, | Performed by: FAMILY MEDICINE

## 2022-08-01 PROCEDURE — 3008F PR BODY MASS INDEX (BMI) DOCUMENTED: ICD-10-PCS | Mod: CPTII,S$GLB,, | Performed by: NURSE PRACTITIONER

## 2022-08-01 PROCEDURE — 1160F RVW MEDS BY RX/DR IN RCRD: CPT | Mod: CPTII,S$GLB,, | Performed by: FAMILY MEDICINE

## 2022-08-01 PROCEDURE — 3074F PR MOST RECENT SYSTOLIC BLOOD PRESSURE < 130 MM HG: ICD-10-PCS | Mod: CPTII,S$GLB,, | Performed by: NURSE PRACTITIONER

## 2022-08-01 PROCEDURE — 99999 PR PBB SHADOW E&M-EST. PATIENT-LVL V: ICD-10-PCS | Mod: PBBFAC,,, | Performed by: FAMILY MEDICINE

## 2022-08-01 PROCEDURE — 99214 OFFICE O/P EST MOD 30 MIN: CPT | Mod: S$GLB,,, | Performed by: FAMILY MEDICINE

## 2022-08-01 PROCEDURE — 3074F SYST BP LT 130 MM HG: CPT | Mod: CPTII,S$GLB,, | Performed by: NURSE PRACTITIONER

## 2022-08-01 PROCEDURE — 1159F PR MEDICATION LIST DOCUMENTED IN MEDICAL RECORD: ICD-10-PCS | Mod: CPTII,S$GLB,, | Performed by: NURSE PRACTITIONER

## 2022-08-01 PROCEDURE — 3078F PR MOST RECENT DIASTOLIC BLOOD PRESSURE < 80 MM HG: ICD-10-PCS | Mod: CPTII,S$GLB,, | Performed by: FAMILY MEDICINE

## 2022-08-01 PROCEDURE — 99214 PR OFFICE/OUTPT VISIT, EST, LEVL IV, 30-39 MIN: ICD-10-PCS | Mod: S$GLB,,, | Performed by: FAMILY MEDICINE

## 2022-08-01 PROCEDURE — 1159F MED LIST DOCD IN RCRD: CPT | Mod: CPTII,S$GLB,, | Performed by: NURSE PRACTITIONER

## 2022-08-01 PROCEDURE — 3044F PR MOST RECENT HEMOGLOBIN A1C LEVEL <7.0%: ICD-10-PCS | Mod: CPTII,S$GLB,, | Performed by: FAMILY MEDICINE

## 2022-08-01 PROCEDURE — 3288F PR FALLS RISK ASSESSMENT DOCUMENTED: ICD-10-PCS | Mod: CPTII,S$GLB,, | Performed by: NURSE PRACTITIONER

## 2022-08-01 PROCEDURE — 3288F PR FALLS RISK ASSESSMENT DOCUMENTED: ICD-10-PCS | Mod: CPTII,S$GLB,, | Performed by: FAMILY MEDICINE

## 2022-08-01 PROCEDURE — 3078F DIAST BP <80 MM HG: CPT | Mod: CPTII,S$GLB,, | Performed by: FAMILY MEDICINE

## 2022-08-01 PROCEDURE — 3288F FALL RISK ASSESSMENT DOCD: CPT | Mod: CPTII,S$GLB,, | Performed by: NURSE PRACTITIONER

## 2022-08-01 PROCEDURE — 4010F ACE/ARB THERAPY RXD/TAKEN: CPT | Mod: CPTII,S$GLB,, | Performed by: FAMILY MEDICINE

## 2022-08-01 PROCEDURE — 3044F HG A1C LEVEL LT 7.0%: CPT | Mod: CPTII,S$GLB,, | Performed by: NURSE PRACTITIONER

## 2022-08-01 RX ORDER — NAPROXEN SODIUM 220 MG/1
81 TABLET, FILM COATED ORAL NIGHTLY
Qty: 90 TABLET | Refills: 4 | Status: SHIPPED | OUTPATIENT
Start: 2022-08-01

## 2022-08-01 NOTE — PROGRESS NOTES
Patient Name: Gena Lui    : 1955  MRN: [id number]     Subjective:  Gena is a 66 y.o. female who presents today for:  Six month follow up with management of chronic conditions which include that include hypertension controlled on Lisinopril 10, hyperlipidemia with hypertriglyceridemia (Lipitor 80), history of seizures now controlled on Keppra - often forgets night time dose, history of stroke (Lipitor and ASA 81 for secondary prevention), anxiety/depression- Xanax 0.5 PRN, osteoporosis-- improved to osteopenia with treatment of vitamin-D deficiency.  Has a history of tobacco abuse-had previously quit but recently resumed in part related to increased medical stressors-currently smoking about a half pack per day.    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 Negative for malignancy.   CBC and CMP pre-surgery 22 were unremarkable    Anxiety has been increased recently due to recent medical issues - has not been taking Xanax more than usual  Chronic low pack pain - stable  Has not been taking aspirin since the surgery and having some bleeding with ill fitting dentures    Diet: getting a little better now that dentures are fixed - lots of eggs, increasing fruit  Exercise is described mainly yard work, has been swimming in [employer] pool, not walking because of alligators in neighborhood  Sleep: poor since restarting smoking, was having trouble getting dentures that fit and worrying about surgery, 4-5 hours a night  Weight: is recently stable BMI 18.8  Smoking 1/3 pack a day  Alcohol - 5 a week  Coffee - cutting back to 2 cups a day    Immunizations: TDaP 2014, FLU UTD 10/27/21, Pneumovax 23 10/5/2018(smoker), SHINGRIX completed 10/5/2018, PREVNAR 13 22, COVID 19 VACCINE 21 (Moderna) and booster 22     Screening Tests: PAP/ HPV wnl/neg 20 & repeat HPV(-) 20 , mammogram 22-- REPEAT 1 YEAR, colonoscopy 2018-- repeat 5 years,  hep C screening (-) 3/21/2018, DEXA 6/18/20- improved from OP to Osteopenia w/ Calcium and VIT D - DUE              Eye/Dental: Eyes - will schedules appointment soon, Dental - has been having issues with fit of top dentures, still has bottom teeth    Review of Systems   HENT: Positive for hearing loss. Negative for ear discharge, ear pain and tinnitus.    Respiratory: Negative.  Negative for cough and shortness of breath.    Cardiovascular: Negative.  Negative for chest pain and leg swelling.   Gastrointestinal: Negative.  Negative for abdominal pain, constipation, diarrhea, nausea and vomiting.   Genitourinary: Negative.    Musculoskeletal: Negative.    Neurological: Negative.    Psychiatric/Behavioral: The patient is nervous/anxious and has insomnia.            Medication List with Changes/Refills   Current Medications    ALPRAZOLAM (XANAX) 0.5 MG TABLET    TAKE 1 TABLET BY MOUTH EVERY DAY AS NEEDED FOR INSOMNIA OR ANXIETY (PANIC ATTACK)    ATORVASTATIN (LIPITOR) 80 MG TABLET    TAKE 1 TABLET BY MOUTH EVERY DAY    ERGOCALCIFEROL (ERGOCALCIFEROL) 50,000 UNIT CAP    TAKE ONE CAPSULE BY MOUTH ONE TIME PER WEEK    HYDROCODONE-ACETAMINOPHEN (NORCO) 5-325 MG PER TABLET    Take 1 tablet by mouth every 6 (six) hours as needed for Pain.    LEVETIRACETAM (KEPPRA) 500 MG TAB    TAKE 1 TABLET BY MOUTH TWICE A DAY    LISINOPRIL 10 MG TABLET    Take 1 tablet (10 mg total) by mouth once daily.    MULTIVIT-MIN/IRON/FOLIC/LUTEIN (MULTIVITAMIN WOMEN 50 PLUS ORAL)    Take 1 tablet by mouth once daily.    ONDANSETRON (ZOFRAN-ODT) 8 MG TBDL    Dissolve 1 tablet (8 mg total) by mouth every 6 (six) hours as needed (nausea).    SARS-COV-2, COVID-19, (MODERNA COVID-19) 100 MCG/0.5 ML INJECTION       Changed and/or Refilled Medications    Modified Medication Previous Medication    ASPIRIN 81 MG CHEW aspirin 81 MG Chew       Take 1 tablet (81 mg total) by mouth every evening.    Take 81 mg by mouth every evening.      .  Vitals:     08/01/22 0902   BP: 128/78   Pulse: 91     Physical Exam  Constitutional:       Appearance: Normal appearance. She is normal weight.   HENT:      Ears:      Comments: Scar tissue noted in bilateral TM     Mouth/Throat:      Mouth: Mucous membranes are moist.      Pharynx: Oropharynx is clear.   Eyes:      Extraocular Movements: Extraocular movements intact.      Pupils: Pupils are equal, round, and reactive to light.   Cardiovascular:      Rate and Rhythm: Normal rate and regular rhythm.   Pulmonary:      Effort: Pulmonary effort is normal.      Breath sounds: Normal breath sounds.   Abdominal:      General: Bowel sounds are normal.      Palpations: Abdomen is soft. There is no mass.      Tenderness: There is no abdominal tenderness.   Musculoskeletal:      Cervical back: Normal range of motion and neck supple.   Skin:     Capillary Refill: Capillary refill takes less than 2 seconds.   Neurological:      General: No focal deficit present.      Mental Status: She is alert and oriented to person, place, and time. Mental status is at baseline.   Psychiatric:         Mood and Affect: Mood normal.         Behavior: Behavior normal.     Gena was seen today for follow-up.    Diagnoses and all orders for this visit:    Benign essential hypertension  -     Hemoglobin A1C; Future  -     Comprehensive Metabolic Panel; Future  -     Lipid Panel; Future  -     CBC Auto Differential; Future  -     TSH; Future  -     Vitamin D; Future  -     Vitamin B12; Future  -     Magnesium; Future  -     DXA Bone Density Spine And Hip; Future    Mixed hyperlipidemia  -     Hemoglobin A1C; Future  -     Comprehensive Metabolic Panel; Future  -     Lipid Panel; Future  -     CBC Auto Differential; Future  -     TSH; Future  -     Vitamin D; Future  -     Vitamin B12; Future  -     Magnesium; Future  -     DXA Bone Density Spine And Hip; Future    Tobacco abuse    BMI less than 19,adult    History of stroke  -     aspirin 81 MG Chew; Take 1  tablet (81 mg total) by mouth every evening.    Long-term use of aspirin therapy    Parotid mass    History of alcohol abuse    Anxiety and depression    Abnormal TSH    History of seizures    Poor dentition    Wears dentures    Postmenopausal  -     DXA Bone Density Spine And Hip; Future    Postmenopausal osteoporosis  -     Comprehensive Metabolic Panel; Future  -     Vitamin D; Future  -     Magnesium; Future    Vitamin D deficiency  -     Vitamin D; Future    Hearing loss, unspecified hearing loss type, unspecified laterality  -     Ambulatory referral/consult to ENT; Future  -     Ambulatory referral/consult to Audiology; Future    Medication management  -     Hemoglobin A1C; Future  -     Comprehensive Metabolic Panel; Future  -     Lipid Panel; Future  -     CBC Auto Differential; Future  -     TSH; Future  -     Vitamin D; Future  -     Vitamin B12; Future  -     Magnesium; Future    Immunizations: TDaP 7/13/2014, FLU UTD 10/27/21, Pneumovax 23 10/5/2018(smoker), SHINGRIX completed 10/5/2018, PREVNAR 13 GIVEN 1/31/22, COVID 19 VACCINE 4/19/21 (Moderna) and booster 2/26/22     Screening Tests: PAP/ HPV wnl/neg 6/16/20 & repeat HPV(-) 2/24/20 , mammogram 7/27/22-- REPEAT 1 YEAR, colonoscopy 4/19/2018-- repeat 5 years, hep C screening (-) 3/21/2018, DEXA 6/18/20 Repeat ORDERED    Hypertension  - stable on lisinopril 10 mg    Stroke hx  - advised to restart aspirin, can take every other day if having bleeding concerns   - advised on smoking cessation    Anxiety with insomnia  - improving now medical issues are resolving  - continue Xanax prn  - discussed possibility of restarting Celexa, delayed currently but will revisit in six months    Tobacco abuse  - offered smoking cessation help, will follow up in six months  -if she has not quite at her follow-up visit will advise Wellbutrin, Chantix, smoking cessation services.    Hearing Loss  - referred to ENT and audiology     Follow up in 6 months (on 2/1/2023) for  to follow up on lab results, return as needed for new concerns.    I hereby acknowledge that I am relying upon documentation authored by a medical student working under my supervision and further I hereby attest that I have verified the student documentation or findings by personally re-performing the physical exam and medical decision making activities of the Evaluation and Management service to be billed.  Rachelle Flores

## 2022-08-04 ENCOUNTER — HOSPITAL ENCOUNTER (OUTPATIENT)
Dept: RADIOLOGY | Facility: HOSPITAL | Age: 67
Discharge: HOME | End: 2022-08-04
Attending: FAMILY MEDICINE
Payer: MEDICARE

## 2022-08-04 DIAGNOSIS — Z78.0 POSTMENOPAUSAL: ICD-10-CM

## 2022-08-04 DIAGNOSIS — E78.2 MIXED HYPERLIPIDEMIA: ICD-10-CM

## 2022-08-04 DIAGNOSIS — I10 BENIGN ESSENTIAL HYPERTENSION: ICD-10-CM

## 2022-08-04 PROCEDURE — 77080 DEXA BONE DENSITY SPINE HIP: ICD-10-PCS | Mod: 26,,, | Performed by: RADIOLOGY

## 2022-08-04 PROCEDURE — 77080 DXA BONE DENSITY AXIAL: CPT | Mod: 26,,, | Performed by: RADIOLOGY

## 2022-08-04 PROCEDURE — 77080 DXA BONE DENSITY AXIAL: CPT | Mod: TC

## 2022-08-09 ENCOUNTER — TELEPHONE (OUTPATIENT)
Dept: FAMILY MEDICINE | Facility: CLINIC | Age: 67
End: 2022-08-09
Payer: MEDICARE

## 2022-08-12 ENCOUNTER — TELEPHONE (OUTPATIENT)
Dept: FAMILY MEDICINE | Facility: CLINIC | Age: 67
End: 2022-08-12
Payer: MEDICARE

## 2022-08-12 NOTE — TELEPHONE ENCOUNTER
Returned patient call in regards to concerns about medication. Patient said she was never on calcium and she was told to continue to take calcium with her Vit D. But patient does take cholesterol medication. Patient also said that she would like to try another option first instead of the injection prolia. Patient said she saw the side effects and she feel that this should be a last option. Patient said the infusion center did call her but she told them that she would like to have them call her back because she wanted to talk to you about other options first. Patient said she don't want to take that injection but would like to try other option that may work. Please advise.

## 2022-08-12 NOTE — TELEPHONE ENCOUNTER
----- Message from Roge Mortensen sent at 8/12/2022  8:40 AM CDT -----  Contact: [phone]  Who Called: PT  Regarding: concerns about medications   Would the patient rather a call back or a response via [facility]? Call back  Best Call Back Number: [phone]  Additional Information: n/a

## 2022-08-13 NOTE — TELEPHONE ENCOUNTER
She can add about 1,000-1,200 mg of Calcium supplement to her vitamin D, and increase regular walking, but my recommendation is still to take the Prolia. She has significant osteoporosis and it is important to consider the consequences of not adequately treating the osteoporosis-- like if she falls she could sustain a spine or hip fracture, and those can be really challenging to recover from.    Prolia is the number one medication I prescribe for osteoporosis treatment, and please reassure her that I have only yifan had 1-2  patients stop it due to side effects in the 7 years I've been prescribing it, so they are very rare.     Again, taking the Prolia is my recommendation as I am doubtful that her density will improve adequately with adding the walking and calcium to her vitamin D, but they will help.    Either way, we should repeat her cone density test in 2 years for monitoring.

## 2022-08-15 NOTE — TELEPHONE ENCOUNTER
"Called patient to inform her of the message below from Dr. Flores. Patient said she can't have dental work while doing the prolia. Patient said that she do not know how long the dental work will be because thy have been working on her top dentures for one year now. Patient said she will do the option of quit smoking, walking, and taking the calcium with the vit D for now.     "She can add about 1,000-1,200 mg of Calcium supplement to her vitamin D, and increase regular walking, but my recommendation is still to take the Prolia. She has significant osteoporosis and it is important to consider the consequences of not adequately treating the osteoporosis-- like if she falls she could sustain a spine or hip fracture, and those can be really challenging to recover from.     Prolia is the number one medication I prescribe for osteoporosis treatment, and please reassure her that I have only yifan had 1-2  patients stop it due to side effects in the 7 years I've been prescribing it, so they are very rare.      Again, taking the Prolia is my recommendation as I am doubtful that her density will improve adequately with adding the walking and calcium to her vitamin D, but they will help.     Either way, we should repeat her cone density test in 2 years for monitoring. "  "

## 2022-08-16 ENCOUNTER — TELEPHONE (OUTPATIENT)
Dept: HEMATOLOGY/ONCOLOGY | Facility: CLINIC | Age: 67
End: 2022-08-16
Payer: MEDICARE

## 2022-08-16 NOTE — TELEPHONE ENCOUNTER
Called pt to schedule injection. Pt declines. Pt states she is waiting on dentures. Notified Dr. Flores. Order to close out referral per Dr. Flores

## 2022-08-22 RX ORDER — ERGOCALCIFEROL 1.25 MG/1
50000 CAPSULE ORAL
Qty: 15 CAPSULE | Refills: 4 | Status: SHIPPED | OUTPATIENT
Start: 2022-08-28

## 2022-09-02 ENCOUNTER — NURSE TRIAGE (OUTPATIENT)
Dept: ADMINISTRATIVE | Facility: CLINIC | Age: 67
End: 2022-09-02
Payer: MEDICARE

## 2022-09-02 ENCOUNTER — HOSPITAL ENCOUNTER (OUTPATIENT)
Facility: HOSPITAL | Age: 67
Discharge: HOME | End: 2022-09-03
Attending: EMERGENCY MEDICINE | Admitting: INTERNAL MEDICINE
Payer: MEDICARE

## 2022-09-02 DIAGNOSIS — F41.9 ANXIETY AND DEPRESSION: ICD-10-CM

## 2022-09-02 DIAGNOSIS — K62.5 BRBPR (BRIGHT RED BLOOD PER RECTUM): Primary | ICD-10-CM

## 2022-09-02 DIAGNOSIS — G47.00 INSOMNIA, UNSPECIFIED TYPE: ICD-10-CM

## 2022-09-02 DIAGNOSIS — K92.2 GI BLEED: ICD-10-CM

## 2022-09-02 DIAGNOSIS — E44.1 PROTEIN-CALORIE MALNUTRITION, MILD: ICD-10-CM

## 2022-09-02 DIAGNOSIS — F32.A ANXIETY AND DEPRESSION: ICD-10-CM

## 2022-09-02 LAB
ABO + RH BLD: NORMAL
ALBUMIN SERPL BCP-MCNC: 4.5 G/DL (ref 3.5–5.2)
ALP SERPL-CCNC: 82 U/L (ref 55–135)
ALT SERPL W/O P-5'-P-CCNC: 31 U/L (ref 10–44)
ANION GAP SERPL CALC-SCNC: 12 MMOL/L (ref 8–16)
AST SERPL-CCNC: 35 U/L (ref 10–40)
BASOPHILS # BLD AUTO: 0.07 K/UL (ref 0–0.2)
BASOPHILS NFR BLD: 0.7 % (ref 0–1.9)
BILIRUB SERPL-MCNC: 1.1 MG/DL (ref 0.1–1)
BLD GP AB SCN CELLS X3 SERPL QL: NORMAL
BUN SERPL-MCNC: 8 MG/DL (ref 8–23)
CALCIUM SERPL-MCNC: 10.1 MG/DL (ref 8.7–10.5)
CHLORIDE SERPL-SCNC: 107 MMOL/L (ref 95–110)
CO2 SERPL-SCNC: 21 MMOL/L (ref 23–29)
CREAT SERPL-MCNC: 0.9 MG/DL (ref 0.5–1.4)
DIFFERENTIAL METHOD: ABNORMAL
EOSINOPHIL # BLD AUTO: 0.1 K/UL (ref 0–0.5)
EOSINOPHIL NFR BLD: 1.1 % (ref 0–8)
ERYTHROCYTE [DISTWIDTH] IN BLOOD BY AUTOMATED COUNT: 13.1 % (ref 11.5–14.5)
EST. GFR  (NO RACE VARIABLE): >60 ML/MIN/1.73 M^2
GLUCOSE SERPL-MCNC: 101 MG/DL (ref 70–110)
HCT VFR BLD AUTO: 40.1 % (ref 37–48.5)
HCV AB SERPL QL IA: NORMAL
HGB BLD-MCNC: 13.6 G/DL (ref 12–16)
HIV 1+2 AB+HIV1 P24 AG SERPL QL IA: NORMAL
IMM GRANULOCYTES # BLD AUTO: 0.08 K/UL (ref 0–0.04)
IMM GRANULOCYTES NFR BLD AUTO: 0.8 % (ref 0–0.5)
INR PPP: 1 (ref 0.8–1.2)
LIPASE SERPL-CCNC: 27 U/L (ref 4–60)
LYMPHOCYTES # BLD AUTO: 2.5 K/UL (ref 1–4.8)
LYMPHOCYTES NFR BLD: 25.6 % (ref 18–48)
MCH RBC QN AUTO: 31.3 PG (ref 27–31)
MCHC RBC AUTO-ENTMCNC: 33.9 G/DL (ref 32–36)
MCV RBC AUTO: 92 FL (ref 82–98)
MONOCYTES # BLD AUTO: 0.7 K/UL (ref 0.3–1)
MONOCYTES NFR BLD: 7.4 % (ref 4–15)
NEUTROPHILS # BLD AUTO: 6.3 K/UL (ref 1.8–7.7)
NEUTROPHILS NFR BLD: 64.4 % (ref 38–73)
NRBC BLD-RTO: 0 /100 WBC
PLATELET # BLD AUTO: 210 K/UL (ref 150–450)
PMV BLD AUTO: 10.1 FL (ref 9.2–12.9)
POTASSIUM SERPL-SCNC: 4 MMOL/L (ref 3.5–5.1)
PROT SERPL-MCNC: 7.8 G/DL (ref 6–8.4)
PROTHROMBIN TIME: 10.8 SEC (ref 9–12.5)
RBC # BLD AUTO: 4.34 M/UL (ref 4–5.4)
SODIUM SERPL-SCNC: 140 MMOL/L (ref 136–145)
WBC # BLD AUTO: 9.79 K/UL (ref 3.9–12.7)

## 2022-09-02 PROCEDURE — 80053 COMPREHEN METABOLIC PANEL: CPT | Performed by: EMERGENCY MEDICINE

## 2022-09-02 PROCEDURE — 99285 EMERGENCY DEPT VISIT HI MDM: CPT | Mod: 25

## 2022-09-02 PROCEDURE — 93010 ELECTROCARDIOGRAM REPORT: CPT | Mod: ,,, | Performed by: INTERNAL MEDICINE

## 2022-09-02 PROCEDURE — 85025 COMPLETE CBC W/AUTO DIFF WBC: CPT | Performed by: EMERGENCY MEDICINE

## 2022-09-02 PROCEDURE — 93010 EKG 12-LEAD: ICD-10-PCS | Mod: ,,, | Performed by: INTERNAL MEDICINE

## 2022-09-02 PROCEDURE — 86850 RBC ANTIBODY SCREEN: CPT | Performed by: EMERGENCY MEDICINE

## 2022-09-02 PROCEDURE — 93005 ELECTROCARDIOGRAM TRACING: CPT

## 2022-09-02 PROCEDURE — 99284 EMERGENCY DEPT VISIT MOD MDM: CPT | Mod: CS,,, | Performed by: EMERGENCY MEDICINE

## 2022-09-02 PROCEDURE — 83690 ASSAY OF LIPASE: CPT | Performed by: EMERGENCY MEDICINE

## 2022-09-02 PROCEDURE — 87389 HIV-1 AG W/HIV-1&-2 AB AG IA: CPT | Performed by: PHYSICIAN ASSISTANT

## 2022-09-02 PROCEDURE — 85610 PROTHROMBIN TIME: CPT | Performed by: EMERGENCY MEDICINE

## 2022-09-02 PROCEDURE — 99284 PR EMERGENCY DEPT VISIT,LEVEL IV: ICD-10-PCS | Mod: CS,,, | Performed by: EMERGENCY MEDICINE

## 2022-09-02 PROCEDURE — 86803 HEPATITIS C AB TEST: CPT | Performed by: PHYSICIAN ASSISTANT

## 2022-09-02 PROCEDURE — 96374 THER/PROPH/DIAG INJ IV PUSH: CPT

## 2022-09-02 RX ADMIN — IOHEXOL 75 ML: 350 INJECTION, SOLUTION INTRAVENOUS at 11:09

## 2022-09-03 VITALS
WEIGHT: 95 LBS | RESPIRATION RATE: 14 BRPM | OXYGEN SATURATION: 98 % | HEART RATE: 73 BPM | DIASTOLIC BLOOD PRESSURE: 56 MMHG | HEIGHT: 59 IN | TEMPERATURE: 98 F | BODY MASS INDEX: 19.15 KG/M2 | SYSTOLIC BLOOD PRESSURE: 110 MMHG

## 2022-09-03 PROBLEM — K62.5 BRBPR (BRIGHT RED BLOOD PER RECTUM): Status: ACTIVE | Noted: 2022-09-03

## 2022-09-03 LAB
BASOPHILS # BLD AUTO: 0.05 K/UL (ref 0–0.2)
BASOPHILS NFR BLD: 0.8 % (ref 0–1.9)
DIFFERENTIAL METHOD: ABNORMAL
EOSINOPHIL # BLD AUTO: 0.1 K/UL (ref 0–0.5)
EOSINOPHIL NFR BLD: 1.8 % (ref 0–8)
ERYTHROCYTE [DISTWIDTH] IN BLOOD BY AUTOMATED COUNT: 13.3 % (ref 11.5–14.5)
HCT VFR BLD AUTO: 35.2 % (ref 37–48.5)
HGB BLD-MCNC: 11.9 G/DL (ref 12–16)
IMM GRANULOCYTES # BLD AUTO: 0.05 K/UL (ref 0–0.04)
IMM GRANULOCYTES NFR BLD AUTO: 0.8 % (ref 0–0.5)
LYMPHOCYTES # BLD AUTO: 1.8 K/UL (ref 1–4.8)
LYMPHOCYTES NFR BLD: 29.6 % (ref 18–48)
MCH RBC QN AUTO: 32 PG (ref 27–31)
MCHC RBC AUTO-ENTMCNC: 33.8 G/DL (ref 32–36)
MCV RBC AUTO: 95 FL (ref 82–98)
MONOCYTES # BLD AUTO: 0.5 K/UL (ref 0.3–1)
MONOCYTES NFR BLD: 7.8 % (ref 4–15)
NEUTROPHILS # BLD AUTO: 3.6 K/UL (ref 1.8–7.7)
NEUTROPHILS NFR BLD: 59.2 % (ref 38–73)
NRBC BLD-RTO: 0 /100 WBC
PLATELET # BLD AUTO: 164 K/UL (ref 150–450)
PMV BLD AUTO: 10.2 FL (ref 9.2–12.9)
RBC # BLD AUTO: 3.72 M/UL (ref 4–5.4)
SARS-COV-2 RDRP RESP QL NAA+PROBE: NEGATIVE
WBC # BLD AUTO: 6.04 K/UL (ref 3.9–12.7)

## 2022-09-03 PROCEDURE — 25500020 PHARM REV CODE 255: Performed by: EMERGENCY MEDICINE

## 2022-09-03 PROCEDURE — 63600175 PHARM REV CODE 636 W HCPCS: Performed by: HOSPITALIST

## 2022-09-03 PROCEDURE — C9113 INJ PANTOPRAZOLE SODIUM, VIA: HCPCS | Performed by: HOSPITALIST

## 2022-09-03 PROCEDURE — U0002 COVID-19 LAB TEST NON-CDC: HCPCS | Performed by: EMERGENCY MEDICINE

## 2022-09-03 PROCEDURE — G0378 HOSPITAL OBSERVATION PER HR: HCPCS

## 2022-09-03 PROCEDURE — 85025 COMPLETE CBC W/AUTO DIFF WBC: CPT | Performed by: HOSPITALIST

## 2022-09-03 PROCEDURE — 25000003 PHARM REV CODE 250: Performed by: HOSPITALIST

## 2022-09-03 PROCEDURE — 99236 HOSP IP/OBS SAME DATE HI 85: CPT | Mod: ,,, | Performed by: HOSPITALIST

## 2022-09-03 PROCEDURE — 99236 PR OBSERV/HOSP SAME DATE,LEVL V: ICD-10-PCS | Mod: ,,, | Performed by: HOSPITALIST

## 2022-09-03 PROCEDURE — 99499 NO LOS: ICD-10-PCS | Mod: ,,,

## 2022-09-03 PROCEDURE — 99499 UNLISTED E&M SERVICE: CPT | Mod: ,,,

## 2022-09-03 RX ORDER — IBUPROFEN 200 MG
1 TABLET ORAL
Status: ACTIVE | OUTPATIENT
Start: 2022-09-03 | End: 2022-09-03

## 2022-09-03 RX ORDER — ATORVASTATIN CALCIUM 40 MG/1
80 TABLET, FILM COATED ORAL DAILY
Status: DISCONTINUED | OUTPATIENT
Start: 2022-09-03 | End: 2022-09-03 | Stop reason: HOSPADM

## 2022-09-03 RX ORDER — LEVETIRACETAM 500 MG/1
500 TABLET ORAL 2 TIMES DAILY
Status: DISCONTINUED | OUTPATIENT
Start: 2022-09-03 | End: 2022-09-03 | Stop reason: HOSPADM

## 2022-09-03 RX ORDER — TALC
6 POWDER (GRAM) TOPICAL NIGHTLY PRN
Status: DISCONTINUED | OUTPATIENT
Start: 2022-09-03 | End: 2022-09-03

## 2022-09-03 RX ORDER — CALCIUM CARBONATE 600 MG
600 TABLET ORAL EVERY MORNING
COMMUNITY

## 2022-09-03 RX ORDER — PANTOPRAZOLE SODIUM 40 MG/10ML
40 INJECTION, POWDER, LYOPHILIZED, FOR SOLUTION INTRAVENOUS 2 TIMES DAILY
Status: DISCONTINUED | OUTPATIENT
Start: 2022-09-03 | End: 2022-09-03 | Stop reason: HOSPADM

## 2022-09-03 RX ORDER — TALC
6 POWDER (GRAM) TOPICAL ONCE
Status: DISCONTINUED | OUTPATIENT
Start: 2022-09-03 | End: 2022-09-03 | Stop reason: HOSPADM

## 2022-09-03 RX ORDER — ALPRAZOLAM 0.25 MG/1
0.5 TABLET ORAL DAILY PRN
Status: DISCONTINUED | OUTPATIENT
Start: 2022-09-03 | End: 2022-09-03 | Stop reason: HOSPADM

## 2022-09-03 RX ORDER — LISINOPRIL 10 MG/1
10 TABLET ORAL DAILY
Status: DISCONTINUED | OUTPATIENT
Start: 2022-09-03 | End: 2022-09-03 | Stop reason: HOSPADM

## 2022-09-03 RX ORDER — LANOLIN ALCOHOL/MO/W.PET/CERES
400 CREAM (GRAM) TOPICAL EVERY MORNING
COMMUNITY

## 2022-09-03 RX ADMIN — PANTOPRAZOLE SODIUM 40 MG: 40 INJECTION, POWDER, FOR SOLUTION INTRAVENOUS at 03:09

## 2022-09-03 RX ADMIN — ATORVASTATIN CALCIUM 80 MG: 20 TABLET, FILM COATED ORAL at 09:09

## 2022-09-03 RX ADMIN — LEVETIRACETAM 500 MG: 500 TABLET, FILM COATED ORAL at 09:09

## 2022-09-03 RX ADMIN — LISINOPRIL 10 MG: 10 TABLET ORAL at 09:09

## 2022-09-03 NOTE — FIRST PROVIDER EVALUATION
"Medical screening exam completed.  I have conducted a focused provider triage encounter, findings are as follows:    Brief history of present illness:  67 yo F c/o rectal bleeding x 3 episodes. Patient feels certain this is not vaginal bleeding. H/o daily 81mg ASA use.    Vitals:    09/02/22 2102   BP: 106/64   Pulse: 93   Resp: 18   Temp: 97.6 °F (36.4 °C)   TempSrc: Oral   SpO2: 97%   Weight: 43.1 kg (95 lb)   Height: 4' 11" (1.499 m)       Pertinent physical exam:  nontoxic, well appearing. No orthostasis    Brief workup plan:  CBC, CMP, INR in the setting of presumed GI bleed.    Preliminary workup initiated; this workup will be continued and followed by the physician or advanced practice provider that is assigned to the patient when roomed.  "

## 2022-09-03 NOTE — PHARMACY MED REC
"  Admission Medication History     The home medication history was taken by Fili Melendez.    You may go to "Admission" then "Reconcile Home Medications" tabs to review and/or act upon these items.     The home medication list has been updated by the Pharmacy department.   Please read ALL comments highlighted in yellow.   Please address this information as you see fit.    Feel free to contact us if you have any questions or require assistance.      The medications listed below were removed from the home medication list. Please reorder if appropriate:  Patient reports no longer taking the following medication(s):  ONDANSETRON (ZOFRAN-ODT) 8 MG TBDL    Medications listed below were obtained from: Patient    Current Outpatient Medications on File Prior to Encounter   Medication Sig    ALPRAZolam (XANAX) 0.5 MG tablet   Take 0.5 mg by mouth daily as needed for Insomnia or Anxiety (or panic attack).    aspirin 81 MG Chew   Take 1 tablet (81 mg total) by mouth every evening.    atorvastatin (LIPITOR) 80 MG tablet   TAKE 1 TABLET BY MOUTH EVERY DAY    calcium carbonate (OS-LV) 600 mg calcium (1,500 mg) Tab   Take 600 mg by mouth every morning. (2 hours after taking Magnesium).    ergocalciferol (ERGOCALCIFEROL) 50,000 unit Cap   Take 1 capsule (50,000 Units total) by mouth every Sunday.    levETIRAcetam (KEPPRA) 500 MG Tab Takes 500 mg once to twice daily. DO NOT CRUSH OR CHEW; SWALLOW WHOLE.      lisinopriL 10 MG tablet   Take 1 tablet (10 mg total) by mouth once daily.    magnesium oxide (MAG-OX) 400 mg (241.3 mg magnesium) tablet   Take 400 mg by mouth every morning.    multivit-min/iron/folic/lutein (MULTIVITAMIN WOMEN 50 PLUS ORAL)   Take 1 tablet by mouth once daily.    sars-cov-2, covid-19, (MODERNA COVID-19) 100 mcg/0.5 ml injection          Potential issues to be addressed PRIOR TO DISCHARGE  Patient reported not taking the following medications: (NORCO). This medication remain on the home medication list. Please " address accordingly.   Patient requires education regarding drug therapies     Fili Melendez CPhT  EXT [phone]                .

## 2022-09-03 NOTE — ED PROVIDER NOTES
Encounter Date: 9/2/2022       History     Chief Complaint   Patient presents with    Rectal Bleeding     Rectal bleeding this evening, takes 81 mg asa, denies symptoms similar in past, denies any pain     HPI  Patient is a 66-year-old female with a history of anxiety, hypertension, hyperlipidemia, seizure disorder, CVA on 81 mg aspirin daily presents the emergency department with 1 day of bright red blood per rectum.  The patient notes 1 episode of blood when wiping after having a bowel movement, then 2 additional episodes approved fluid per rectum, non painless, no associated nausea vomiting.  Patient denies a history of hemorrhoids in the past, no history of recent constipation.  Patient denies any abdominal pain, no fevers chills, no UTI symptoms, no vaginal bleeding.    Review of patient's allergies indicates:   Allergen Reactions    Versed [midazolam]      IV Versed altered mental status     Past Medical History:   Diagnosis Date    Anxiety and depression 3/21/2018    Benign essential hypertension 3/21/2018    Chronic bilateral low back pain without sciatica 3/21/2018    History of stroke 3/21/2018    Memory loss     Mixed hyperlipidemia 3/21/2018    Seizures     Tobacco abuse 3/21/2018     Past Surgical History:   Procedure Laterality Date    ANTERIOR VAGINAL REPAIR      CATARACT EXTRACTION Right 10/16/2007    Dr. Ramsey //yag 5/1/18 Dr. Bentley    CATARACT EXTRACTION W/  INTRAOCULAR LENS IMPLANT Left 10/30/2007    Dr. Ramsey// yag 5/1/18 Dr. Bentley    CHOLECYSTECTOMY      COLONOSCOPY N/A 4/19/2018    Procedure: COLONOSCOPY;  Surgeon: Yi Goncalves MD;  Location: [facility];  Service: Endoscopy;  Laterality: N/A;    EXCISION OF PAROTID GLAND Left 7/8/2022    Procedure: EXCISION, PAROTID GLAND;  Surgeon: Kit Conley MD;  Location: [address];  Service: ENT;  Laterality: Left;    EYE SURGERY      ROBOT-ASSISTED LAPAROSCOPIC REPAIR OF VENTRAL HERNIA N/A 6/28/2021    Procedure: ROBOTIC REPAIR,  HERNIA, VENTRAL;  Surgeon: Alli Dumont Jr., MD;  Location: [facility];  Service: General;  Laterality: N/A;    TONSILLECTOMY      UMBILICAL HERNIA REPAIR N/A 2/8/2019    Procedure: REPAIR, HERNIA, UMBILICAL, AGE 5 YEARS OR OLDER, open with/without mesh;  Surgeon: Magno Mishra MD;  Location: [facility] OR [address] [place] [address];  Service: General;  Laterality: N/A;     Family History   Problem Relation Age of Onset    Blindness Neg Hx     Glaucoma Neg Hx     Macular degeneration Neg Hx     Retinal detachment Neg Hx      Social History     Tobacco Use    Smoking status: Light Smoker     Types: Cigarettes     Last attempt to quit: 5/5/2019     Years since quitting: 3.3    Smokeless tobacco: Never    Tobacco comments:     started smoking again in 12/2020(when nervous/bored)   Substance Use Topics    Alcohol use: Yes     Comment: 1-2 drinks daily    Drug use: Yes     Types: Marijuana     Comment: intermittent to take the edge off of her anxiety     Review of Systems  10 point review of systems reviewed with patient otherwise negative.     Physical Exam     Initial Vitals [09/02/22 2102]   BP Pulse Resp Temp SpO2   106/64 93 18 97.6 °F (36.4 °C) 97 %      MAP       --         Physical Exam  Physical Exam     Nursing note and vitals reviewed.  Constitutional: Patient appears well-developed and well-nourished. No distress.   HENT:   Head: Normocephalic and atraumatic.   Eyes: Conjunctivae and EOM are normal. Pupils are equal, round, and reactive to light.   Neck: Neck supple.   Normal range of motion.  Cardiovascular: Normal rate, regular rhythm, normal heart sounds and intact distal pulses.   Pulmonary/Chest: Breath sounds normal.   Abdominal: Abdomen is soft. Patient exhibits no distension. There is no abdominal tenderness.   :  No bleeding noted from vagina  Rectal:  No visible external hemorrhoids, no palpable masses, no palpable internal hemorrhoids, bright red blood noted on rectal exam, no melena, no visible  fissures.  Musculoskeletal:      Cervical back: Normal range of motion and neck supple.   Neurological: Patient is alert and oriented to person, place, and time. No cranial nerve deficit. Gait normal. GCS score is 15.    Skin: Skin is warm and dry.  Psych: Normal mood/affect    ED Course   Procedures  Labs Reviewed   CBC W/ AUTO DIFFERENTIAL - Abnormal; Notable for the following components:       Result Value    MCH 31.3 (*)     Immature Granulocytes 0.8 (*)     Immature Grans (Abs) 0.08 (*)     All other components within normal limits   COMPREHENSIVE METABOLIC PANEL - Abnormal; Notable for the following components:    CO2 21 (*)     Total Bilirubin 1.1 (*)     All other components within normal limits   PROTIME-INR    Narrative:     Release to patient->Immediate   LIPASE    Narrative:     Release to patient->Immediate   HIV 1 / 2 ANTIBODY    Narrative:     Release to patient->Immediate   HEPATITIS C ANTIBODY    Narrative:     Release to patient->Immediate   TYPE & SCREEN   I have personally reviewed the patient's laboratory data.   Hemoglobin 13  Chemistry panel unremarkable, creatinine within normal limits, BUN of 8, AST and ALT within normal limits, lipase 27  INR 1.0           Imaging Results              CTA Acute GI [place], Abdomen and Pelvis (In process)  Result time 09/02/22 23:32:57                     Medications   iohexoL (OMNIPAQUE 350) injection 75 mL (75 mLs Intravenous Given 9/2/22 1236)     Patient presents with 1 day of bright red blood per rectum, painless without evidence of internal hemorrhoids, external hemorrhoids, anal fissure on exam.  Hemoglobin is normal on arrival to the emergency department, vital signs with mild hypotension which is similar to outpatient blood pressures which typically run in the low 110's.      CT abdomen pelvis was remarkable for internal hemorrhoid with active oozing    Discussed with colorectal surgery who recommended observation given ongoing bleeding.      Patient  was admitted for observation.    Clinical Impression:   Final diagnoses:  [K92.2] GI bleed               Angeles Rock MD  09/05/22 3683

## 2022-09-03 NOTE — TELEPHONE ENCOUNTER
"Pt stated that she felt like she needed to have a BM. Went to bathroom and didn't feel anything. Pt reported that when she got up she noted a toilet full of red blood, blood on her underwear and blood on the tissue. Care advise to go to ED per protocol. Verbalized understanding, Daughter will bring pt to ED. Encounter routed to provider.       Reason for Disposition   SEVERE rectal bleeding (large blood clots; on and off, or constant bleeding)    Additional Information   Negative: Shock suspected (e.g., cold/pale/clammy skin, too weak to stand, low BP, rapid pulse)   Negative: Difficult to awaken or acting confused  (e.g., disoriented, slurred speech)   Negative: Passed out (i.e., lost consciousness, collapsed and was not responding)   Negative: [1] Vomiting AND [2] contains red blood or black ("coffee ground") material  (Exception: few red streaks in vomit that only happened once)   Negative: Sounds like a life-threatening emergency to the triager    Protocols used: Rectal Bleeding-A-AH    "

## 2022-09-03 NOTE — ASSESSMENT & PLAN NOTE
-had 2 episode at home prior to arrival   -due to internal hemorrhoidal bleed as CTA abdomen showed hemorrhoid with arterial enhancement  -colonoscopy in 2018 showed internal hemorrhoid and 6 mm cecal polyp resected   -denies abdominal pain and abdomen benign on exam   -no further episode of bleeding   -Hgb stable at 13 and remained hemodynamically stable   -ED consulted CRS with pending evaluation in am

## 2022-09-03 NOTE — HOSPITAL COURSE
Patient placed in observation for GI bleed. CTA performed in the ED without evidence of active bleeding. CRS consulted, no indication for urgent surgical intervention as bleeding as resolved. Plan for follow-up outpatient for elective hemorrhoid banding. Hb stable, no other episodes of BRBPR. Patient stable for discharge. Recommend initiating a stool softener daily to prevent straining. Instructed patient to follow-up with PCP within 1-2 weeks. Return precautions given and patient verbalized understanding. All questions answered.

## 2022-09-03 NOTE — ED TRIAGE NOTES
Gena Lui, a 66 y.o. female presents to the ED w/ complaint of     Triage note:  Chief Complaint   Patient presents with    Rectal Bleeding     Rectal bleeding this evening, takes 81 mg asa, denies symptoms similar in past, denies any pain     Review of patient's allergies indicates:   Allergen Reactions    Versed [midazolam]      IV Versed altered mental status     Past Medical History:   Diagnosis Date    Anxiety and depression 3/21/2018    Benign essential hypertension 3/21/2018    Chronic bilateral low back pain without sciatica 3/21/2018    History of stroke 3/21/2018    Memory loss     Mixed hyperlipidemia 3/21/2018    Seizures     Tobacco abuse 3/21/2018      Patient identifiers for Gena Lui checked and correct.    LOC: The patient is awake, alert and aware of environment with an appropriate affect, the patient is oriented x 4 and speaking appropriately.    APPEARANCE: Patient resting comfortably and in no acute distress, patient is clean and well groomed, patient's clothing is properly fastened.    SKIN: The skin is warm and dry, color consistent with ethnicity, patient has normal skin turgor and moist mucus membranes, skin intact, no breakdown or bruising noted.    MUSCULOSKELETAL: Patient moving all extremities well, no obvious swelling or deformities noted.    RESPIRATORY: Airway is open and patent, respirations are spontaneous and even, patient has a normal effort and rate.    CARDIAC: Patient has a normal rate and rhythm, no periphreal edema noted, capillary refill < 3 seconds. Normal +2 pedal pulses present.    ABDOMEN: Soft and non tender to palpation, no distention noted. Patient denies any nausea, vomiting, diarrhea, or constipation. +rectal bleeding.    NEUROLOGIC: Eyes open spontaneously, PERRL, behavior appropriate to situation, follows commands, facial expression symmetrical, bilateral hand grasp equal and even, purposeful motor response noted, normal sensation in all extremities.      Allergies reported:   Review of patient's allergies indicates:   Allergen Reactions    Versed [midazolam]      IV Versed altered mental status

## 2022-09-03 NOTE — CONSULTS
[facility] - Emergency Dept  Colorectal Surgery  Consult Note    Patient Name: Gena Lui  MRN: [id number]  Admission Date: 9/2/2022  Hospital Length of Stay: 0 days  Attending Physician: Jake Hughes MD  Primary Care Provider: Rachelle Flores MD    Inpatient consult to Colorectal Surgery  Consult performed by: Debi Diaz MD  Consult ordered by: Angeles Rock MD      Subjective:     Chief Complaint/Reason for Admission: BRBPR    History of Present Illness:   66F PMH anxiety, HTN, HLD, Seizure disorder, CVA (on ASA 81mg), presenting with BRBPR. Patient first had a bloody BM yesterday afternoon after work, which was bright red. She had another similar bloody BM half an hour later, after which she called the [facility] on call nurse who advised patient to present to ED for further evaluation. Patient denies abdominal or rectal pain. Denies nausea/vomiting. Denies fever/emesis.    Has not had any further BM since arrival to the ED. CTA obtained which was negative for active hemorrhage but showed an internal hemorrhoid with arterial enhancement.     Current Facility-Administered Medications   Medication    ALPRAZolam tablet 0.5 mg    atorvastatin tablet 80 mg    levETIRAcetam tablet 500 mg    lisinopriL tablet 10 mg    melatonin tablet 6 mg    nicotine 21 mg/24 hr 1 patch    pantoprazole injection 40 mg     Current Outpatient Medications   Medication Sig    ALPRAZolam (XANAX) 0.5 MG tablet TAKE 1 TABLET BY MOUTH EVERY DAY AS NEEDED FOR INSOMNIA OR ANXIETY (PANIC ATTACK) (Patient taking differently: Take 0.5 mg by mouth daily as needed for Insomnia or Anxiety (or panic attack).)    aspirin 81 MG Chew Take 1 tablet (81 mg total) by mouth every evening.    atorvastatin (LIPITOR) 80 MG tablet TAKE 1 TABLET BY MOUTH EVERY DAY    calcium carbonate (OS-LV) 600 mg calcium (1,500 mg) Tab Take 600 mg by mouth every morning. (2 hours after taking Magnesium)    ergocalciferol (ERGOCALCIFEROL) 50,000 unit Cap Take 1 capsule  (50,000 Units total) by mouth every Sunday.    levETIRAcetam (KEPPRA) 500 MG Tab TAKE 1 TABLET BY MOUTH TWICE A DAY (Patient taking differently: Takes 500 mg once to twice daily. DO NOT CRUSH OR CHEW; SWALLOW WHOLE.)    lisinopriL 10 MG tablet Take 1 tablet (10 mg total) by mouth once daily.    magnesium oxide (MAG-OX) 400 mg (241.3 mg magnesium) tablet Take 400 mg by mouth every morning.    multivit-min/iron/folic/lutein (MULTIVITAMIN WOMEN 50 PLUS ORAL) Take 1 tablet by mouth once daily.    HYDROcodone-acetaminophen (NORCO) 5-325 mg per tablet Take 1 tablet by mouth every 6 (six) hours as needed for Pain. (Patient not taking: Reported on 9/3/2022)    sars-cov-2, covid-19, (MODERNA COVID-19) 100 mcg/0.5 ml injection        Review of patient's allergies indicates:   Allergen Reactions    Versed [midazolam]      IV Versed altered mental status       Past Medical History:   Diagnosis Date    Anxiety and depression 3/21/2018    Benign essential hypertension 3/21/2018    Chronic bilateral low back pain without sciatica 3/21/2018    History of stroke 3/21/2018    Memory loss     Mixed hyperlipidemia 3/21/2018    Seizures     Tobacco abuse 3/21/2018     Past Surgical History:   Procedure Laterality Date    ANTERIOR VAGINAL REPAIR      CATARACT EXTRACTION Right 10/16/2007    Dr. aRmsey //yag 5/1/18 Dr. Bentley    CATARACT EXTRACTION W/  INTRAOCULAR LENS IMPLANT Left 10/30/2007    Dr. Ramsey// yag 5/1/18 Dr. Bentley    CHOLECYSTECTOMY      COLONOSCOPY N/A 4/19/2018    Procedure: COLONOSCOPY;  Surgeon: Yi Goncalves MD;  Location: [facility] ENDO;  Service: Endoscopy;  Laterality: N/A;    EXCISION OF PAROTID GLAND Left 7/8/2022    Procedure: EXCISION, PAROTID GLAND;  Surgeon: Kit Conley MD;  Location: [facility] OR [address] [place] [address];  Service: ENT;  Laterality: Left;    EYE SURGERY      ROBOT-ASSISTED LAPAROSCOPIC REPAIR OF VENTRAL HERNIA N/A 6/28/2021    Procedure: ROBOTIC REPAIR, HERNIA, VENTRAL;  Surgeon: Alli Dumont Jr.,  MD;  Location: [facility];  Service: General;  Laterality: N/A;    TONSILLECTOMY      UMBILICAL HERNIA REPAIR N/A 2/8/2019    Procedure: REPAIR, HERNIA, UMBILICAL, AGE 5 YEARS OR OLDER, open with/without mesh;  Surgeon: Magno Mishra MD;  Location: [facility] OR [address] [place] [address];  Service: General;  Laterality: N/A;     Family History    None       Tobacco Use    Smoking status: Light Smoker     Types: Cigarettes     Last attempt to quit: 5/5/2019     Years since quitting: 3.3    Smokeless tobacco: Never    Tobacco comments:     started smoking again in 12/2020(when nervous/bored)   Substance and Sexual Activity    Alcohol use: Yes     Comment: 1-2 drinks daily    Drug use: Yes     Types: Marijuana     Comment: intermittent to take the edge off of her anxiety    Sexual activity: Never     Review of Systems  Objective:     Vital Signs (Most Recent):  Temp: 97.8 °F (36.6 °C) (09/03/22 0832)  Pulse: 73 (09/03/22 0832)  Resp: 14 (09/03/22 0832)  BP: (!) 110/56 (09/03/22 0832)  SpO2: 98 % (09/03/22 0352)   Vital Signs (24h Range):  Temp:  [97.6 °F (36.4 °C)-97.8 °F (36.6 °C)] 97.8 °F (36.6 °C)  Pulse:  [68-93] 73  Resp:  [14-19] 14  SpO2:  [97 %-98 %] 98 %  BP: (106-124)/(56-69) 110/56     Weight: 43.1 kg (95 lb)  Body mass index is 19.19 kg/m².    Physical Exam  Gen: Laying in bed, in NAD  Resp: Nonlabored  CV: RRR  Abd: Soft, NT/ND  Ext: No C/C/E    Anorectal Exam:  Anal Skin: Normal    Digital Rectal Exam:  Resting Tone normal  Squeeze normal  Mass none    No blood in the rectal vault    Significant Labs:  CBC (Last 3 Results):   Recent Labs   Lab 09/02/22 2116 09/03/22  0351   WBC 9.79 6.04   RBC 4.34 3.72*   HGB 13.6 11.9*   HCT 40.1 35.2*    164   MCV 92 95   MCH 31.3* 32.0*   MCHC 33.9 33.8       Significant Diagnostics:  Imaging Results               CTA Acute GI [place], Abdomen and Pelvis (Final result)  Result time 09/03/22 00:33:19      Final result by Kaiden Bullock MD (09/03/22 00:33:19)                    Impression:      1. No intraluminal contrast extravasation or delayed pooling to suggest upper gastrointestinal hemorrhage.  2. Probable internal hemorrhoid anteriorly with small lose on arterial phase image.  Correlation needed.  3. Pneumobilia.  Recommend clinical correlation for recent biliary instrumentation or other possible inciting causes.  4. Findings compatible with pelvic venous congestion syndrome especially on the left.  Correlate with gyn findings.  5.  This report was flagged in Epic as abnormal.    Electronically signed by resident: Fortino Weldon  Date:    09/02/2022  Time:    23:32    Electronically signed by: Kaiden Bullock  Date:    09/03/2022  Time:    00:33               Narrative:    EXAMINATION:  CTA ACUTE GI BLEED, ABDOMEN AND PELVIS    CLINICAL HISTORY:  Bright red blood per rectum, painless;    TECHNIQUE:  CTA images were obtained from the lung bases to the pubic symphysis.  Noncontrast, arterial, and delayed phase images were acquired. 75 mL of intravenous Omnipaque 350 was administered.  Oral contrast was not administered. Axial MIP, sagittal, and coronal reformats were obtained.    COMPARISON:  CT abdomen pelvis 01/27/2019    FINDINGS:  LUNG BASES & MEDIASTINUM (limited):  Unremarkable.    HEPATOBILIARY: 0.9 cm hypodensity in the right lobe of the liver, too small to characterize.  No focal hepatic lesions. No biliary ductal dilatation. Cholecystectomy.  Central and left-sided pneumobilia.    SPLEEN:  No splenomegaly.    PANCREAS:  No focal masses or ductal dilatation.    ADRENALS:  No adrenal nodules.    KIDNEYS/URETERS:  No hydronephrosis, stones or solid mass lesions. Visualized ureters are unremarkable.    PELVIC ORGANS/BLADDER:  Bladder is contracted.  Uterus is unremarkable.  No adnexal masses.  Numerous dilated pelvic adnexal vessels appearing to represent dilated veins with prominence of the gonadal vein on the left up to a cm.    PERITONEUM / RETROPERITONEUM:  No free air.  No free fluid.    LYMPH NODES:  No lymphadenopathy.    VESSELS:  The celiac, superior mesenteric and inferior mesenteric arteries are patent.  There are single renal arteries bilaterally. Renal arteries are patent without significant stenosis.  Advanced aortoiliac calcific plaque.    GI TRACT: No intraluminal contrast extravasation or delayed pooling to suggest upper gastrointestinal hemorrhage.  No distension or wall thickening. Normal appendix..  Arterial enhancement near the anal rectal verge anteriorly and nodularity suggests hemorrhoids.    BONES AND ABDOMINAL SOFT TISSUES: Soft tissues are unremarkable.  Superior endplate deformities at L5 and L2.  No fractures or focal osseous lesions.                                      CT: I have reviewed all pertinent results/findings within the past 24 hours:       Assessment/Plan:     Active Diagnoses:    Diagnosis Date Noted POA    BRBPR (bright red blood per rectum) [K62.5] 09/03/2022 Yes    Mixed hyperlipidemia [E78.2] 03/21/2018 Yes    Benign essential hypertension [I10] 03/21/2018 Yes    History of seizures [Z87.898] 03/21/2018 Not Applicable      Problems Resolved During this Admission:       66F presenting with BRBPR and CTA suggestive of internal hemorrhoid bleed, now resolved    - Bleeding has resolved - No indication for urgent surgical intervention  - Recommend outpatient follow up with CRS for elective hemorrhoid banding - discussed with patient, who is agreeable  - Discussed with attending Dr. Kori OTERO MD  Colorectal Surgery  [facility] - Emergency Dept

## 2022-09-03 NOTE — H&P
[facility] - Emergency Dept  [facility] Medicine  History & Physical    Patient Name: Gena Lui  MRN: [id number]  Patient Class: OP- Observation  Admission Date: 9/2/2022  Attending Physician: Jake Hughes MD   Primary Care Provider: Rachelle Flores MD         Patient information was obtained from patient and ER records.     Subjective:     Principal Problem:<principal problem not specified>    Chief Complaint:   Chief Complaint   Patient presents with    Rectal Bleeding     Rectal bleeding this evening, takes 81 mg asa, denies symptoms similar in past, denies any pain        HPI: Ms. Lui is a 66-year-old female with a history of anxiety, hypertension, hyperlipidemia, seizure disorder, CVA on 81 mg aspirin daily who presented to the emergency department for evaluation of BRBPR prior to coming to ED. Patient states she went to have bowel movement yesterday evening after coming from work. She notices bright red blood on the toilet without any stool. She has another similar episode about half an hour later when she called [facility] on call nurse who advised her to come to ED. She denies abdominal pain, N/V/D, fever, chills, chest pain, sob, dizziness or palpitation. She reports intermittent constipation but not in last few days. Patient states she had screening colonoscopy in 2018 which showed internal hemorrhoid and 6 mm cecal polyp which was resected.     In ED patient is afebrile and vitals stable. No further episode of bleeding since came to ED. Rectal exam in ED showed gross red blood. CTA abdomen negative for active hemorrhage, but showed internal hemorrhoid with arterial enhancement. ED consulted CRS who recommended observation pending evaluation in morning.         Past Medical History:   Diagnosis Date    Anxiety and depression 3/21/2018    Benign essential hypertension 3/21/2018    Chronic bilateral low back pain without sciatica 3/21/2018    History of stroke 3/21/2018    Memory loss     Mixed  hyperlipidemia 3/21/2018    Seizures     Tobacco abuse 3/21/2018       Past Surgical History:   Procedure Laterality Date    ANTERIOR VAGINAL REPAIR      CATARACT EXTRACTION Right 10/16/2007    Dr. Ramsey //yag 5/1/18 Dr. Bentley    CATARACT EXTRACTION W/  INTRAOCULAR LENS IMPLANT Left 10/30/2007    Dr. Ramsey// yag 5/1/18 Dr. Bentley    CHOLECYSTECTOMY      COLONOSCOPY N/A 4/19/2018    Procedure: COLONOSCOPY;  Surgeon: Yi Goncalves MD;  Location: [facility] ENDO;  Service: Endoscopy;  Laterality: N/A;    EXCISION OF PAROTID GLAND Left 7/8/2022    Procedure: EXCISION, PAROTID GLAND;  Surgeon: Kit Conley MD;  Location: [facility] OR [address] [place] [address];  Service: ENT;  Laterality: Left;    EYE SURGERY      ROBOT-ASSISTED LAPAROSCOPIC REPAIR OF VENTRAL HERNIA N/A 6/28/2021    Procedure: ROBOTIC REPAIR, HERNIA, VENTRAL;  Surgeon: Alli Dumont Jr., MD;  Location: [facility] OR;  Service: General;  Laterality: N/A;    TONSILLECTOMY      UMBILICAL HERNIA REPAIR N/A 2/8/2019    Procedure: REPAIR, HERNIA, UMBILICAL, AGE 5 YEARS OR OLDER, open with/without mesh;  Surgeon: Magno Mishra MD;  Location: [facility] OR [address] [place] [address];  Service: General;  Laterality: N/A;       Review of patient's allergies indicates:   Allergen Reactions    Versed [midazolam]      IV Versed altered mental status       No current facility-administered medications on file prior to encounter.     Current Outpatient Medications on File Prior to Encounter   Medication Sig    ALPRAZolam (XANAX) 0.5 MG tablet TAKE 1 TABLET BY MOUTH EVERY DAY AS NEEDED FOR INSOMNIA OR ANXIETY (PANIC ATTACK) (Patient taking differently: Take 0.5 mg by mouth daily as needed for Insomnia or Anxiety (or panic attack).)    aspirin 81 MG Chew Take 1 tablet (81 mg total) by mouth every evening.    atorvastatin (LIPITOR) 80 MG tablet TAKE 1 TABLET BY MOUTH EVERY DAY (Patient taking differently: Take 80 mg by mouth once daily.)    ergocalciferol (ERGOCALCIFEROL) 50,000 unit Cap  Take 1 capsule (50,000 Units total) by mouth every Sunday.    HYDROcodone-acetaminophen (NORCO) 5-325 mg per tablet Take 1 tablet by mouth every 6 (six) hours as needed for Pain.    levETIRAcetam (KEPPRA) 500 MG Tab TAKE 1 TABLET BY MOUTH TWICE A DAY (Patient taking differently: Take 500 mg by mouth 2 (two) times daily. DO NOT CRUSH OR CHEW; SWALLOW WHOLE.)    lisinopriL 10 MG tablet Take 1 tablet (10 mg total) by mouth once daily.    multivit-min/iron/folic/lutein (MULTIVITAMIN WOMEN 50 PLUS ORAL) Take 1 tablet by mouth once daily.    ondansetron (ZOFRAN-ODT) 8 MG TbDL Dissolve 1 tablet (8 mg total) by mouth every 6 (six) hours as needed (nausea).    sars-cov-2, covid-19, (MODERNA COVID-19) 100 mcg/0.5 ml injection     [DISCONTINUED] mirtazapine (REMERON) 7.5 MG Tab Take 1-2 tabs nightly for help with insomnia and appetite stimulation     Family History    None       Tobacco Use    Smoking status: Light Smoker     Types: Cigarettes     Last attempt to quit: 5/5/2019     Years since quitting: 3.3    Smokeless tobacco: Never    Tobacco comments:     started smoking again in 12/2020(when nervous/bored)   Substance and Sexual Activity    Alcohol use: Yes     Comment: 1-2 drinks daily    Drug use: Yes     Types: Marijuana     Comment: intermittent to take the edge off of her anxiety    Sexual activity: Never     Review of Systems   Constitutional:  Negative for activity change, appetite change, chills, diaphoresis, fatigue and fever.   HENT:  Negative for congestion, dental problem, drooling, ear discharge, ear pain, facial swelling, hearing loss, mouth sores, nosebleeds, postnasal drip, rhinorrhea, sinus pressure, sneezing, sore throat, tinnitus, trouble swallowing and voice change.    Eyes:  Negative for photophobia, pain, discharge, redness, itching and visual disturbance.   Respiratory:  Negative for apnea, cough, choking, chest tightness, shortness of breath, wheezing and stridor.    Cardiovascular:   Negative for chest pain, palpitations and leg swelling.   Gastrointestinal:  Positive for anal bleeding. Negative for abdominal distention, abdominal pain, blood in stool, constipation, diarrhea, nausea, rectal pain and vomiting.   Endocrine: Negative for cold intolerance, heat intolerance, polydipsia, polyphagia and polyuria.   Genitourinary:  Negative for decreased urine volume, difficulty urinating, dyspareunia, dysuria, enuresis, flank pain, frequency, genital sores, hematuria, menstrual problem, pelvic pain, urgency, vaginal bleeding, vaginal discharge and vaginal pain.   Musculoskeletal:  Negative for arthralgias, back pain, gait problem, joint swelling, myalgias, neck pain and neck stiffness.   Skin:  Negative for color change, pallor, rash and wound.   Allergic/Immunologic: Negative for environmental allergies, food allergies and immunocompromised state.   Neurological:  Negative for dizziness, tremors, seizures, syncope, facial asymmetry, speech difficulty, weakness, light-headedness, numbness and headaches.   Hematological:  Negative for adenopathy. Does not bruise/bleed easily.   Psychiatric/Behavioral:  Negative for agitation, behavioral problems, confusion, decreased concentration, dysphoric mood, hallucinations, self-injury, sleep disturbance and suicidal ideas. The patient is not nervous/anxious and is not hyperactive.    Objective:     Vital Signs (Most Recent):  Temp: 97.7 °F (36.5 °C) (09/03/22 0352)  Pulse: 68 (09/03/22 0352)  Resp: 19 (09/03/22 0352)  BP: 121/69 (09/03/22 0352)  SpO2: 98 % (09/03/22 0352) Vital Signs (24h Range):  Temp:  [97.6 °F (36.4 °C)-97.7 °F (36.5 °C)] 97.7 °F (36.5 °C)  Pulse:  [68-93] 68  Resp:  [18-19] 19  SpO2:  [97 %-98 %] 98 %  BP: (106-124)/(59-69) 121/69     Weight: 43.1 kg (95 lb)  Body mass index is 19.19 kg/m².    Physical Exam  Constitutional:       General: She is not in acute distress.     Appearance: She is well-developed. She is not diaphoretic.   HENT:       Head: Normocephalic and atraumatic.      Right Ear: External ear normal.      Left Ear: External ear normal.      Nose: Nose normal.      Mouth/Throat:      Pharynx: No oropharyngeal exudate.   Eyes:      General: No scleral icterus.     Conjunctiva/sclera: Conjunctivae normal.      Pupils: Pupils are equal, round, and reactive to light.   Neck:      Thyroid: No thyromegaly.      Vascular: No JVD.      Trachea: No tracheal deviation.   Cardiovascular:      Rate and Rhythm: Normal rate and regular rhythm.      Heart sounds: Normal heart sounds. No murmur heard.    No gallop.   Pulmonary:      Effort: Pulmonary effort is normal. No respiratory distress.      Breath sounds: Normal breath sounds. No wheezing or rales.   Chest:      Chest wall: No tenderness.   Abdominal:      General: Bowel sounds are normal. There is no distension.      Palpations: Abdomen is soft. There is no mass.      Tenderness: There is no abdominal tenderness. There is no guarding or rebound.   Genitourinary:     Vagina: No vaginal discharge.   Musculoskeletal:         General: No tenderness.      Cervical back: Neck supple.   Lymphadenopathy:      Cervical: No cervical adenopathy.   Skin:     General: Skin is warm and dry.      Coloration: Skin is not pale.      Findings: No erythema or rash.   Neurological:      Mental Status: She is alert and oriented to person, place, and time.      Cranial Nerves: No cranial nerve deficit.      Motor: No abnormal muscle tone.      Coordination: Coordination normal.      Deep Tendon Reflexes: Reflexes are normal and symmetric. Reflexes normal.      Comments: Mild tremors of upper extremities    Psychiatric:         Behavior: Behavior normal.         Thought Content: Thought content normal.         Judgment: Judgment normal.      Comments: Flat affect          CRANIAL NERVES     CN III, IV, VI   Pupils are equal, round, and reactive to light.     Significant Labs: All pertinent labs within the past 24 hours  have been reviewed.  None    Recent Results (from the past 24 hour(s))   CBC auto differential    Collection Time: 09/02/22  9:16 PM   Result Value Ref Range    WBC 9.79 3.90 - 12.70 K/uL    RBC 4.34 4.00 - 5.40 M/uL    Hemoglobin 13.6 12.0 - 16.0 g/dL    Hematocrit 40.1 37.0 - 48.5 %    MCV 92 82 - 98 fL    MCH 31.3 (H) 27.0 - 31.0 pg    MCHC 33.9 32.0 - 36.0 g/dL    RDW 13.1 11.5 - 14.5 %    Platelets 210 150 - 450 K/uL    MPV 10.1 9.2 - 12.9 fL    Immature Granulocytes 0.8 (H) 0.0 - 0.5 %    Gran # (ANC) 6.3 1.8 - 7.7 K/uL    Immature Grans (Abs) 0.08 (H) 0.00 - 0.04 K/uL    Lymph # 2.5 1.0 - 4.8 K/uL    Mono # 0.7 0.3 - 1.0 K/uL    Eos # 0.1 0.0 - 0.5 K/uL    Baso # 0.07 0.00 - 0.20 K/uL    nRBC 0 0 /100 WBC    Gran % 64.4 38.0 - 73.0 %    Lymph % 25.6 18.0 - 48.0 %    Mono % 7.4 4.0 - 15.0 %    Eosinophil % 1.1 0.0 - 8.0 %    Basophil % 0.7 0.0 - 1.9 %    Differential Method Automated    Comprehensive metabolic panel    Collection Time: 09/02/22  9:16 PM   Result Value Ref Range    Sodium 140 136 - 145 mmol/L    Potassium 4.0 3.5 - 5.1 mmol/L    Chloride 107 95 - 110 mmol/L    CO2 21 (L) 23 - 29 mmol/L    Glucose 101 70 - 110 mg/dL    BUN 8 8 - 23 mg/dL    Creatinine 0.9 0.5 - 1.4 mg/dL    Calcium 10.1 8.7 - 10.5 mg/dL    Total Protein 7.8 6.0 - 8.4 g/dL    Albumin 4.5 3.5 - 5.2 g/dL    Total Bilirubin 1.1 (H) 0.1 - 1.0 mg/dL    Alkaline Phosphatase 82 55 - 135 U/L    AST 35 10 - 40 U/L    ALT 31 10 - 44 U/L    Anion Gap 12 8 - 16 mmol/L    eGFR >60.0 >60 mL/min/1.73 m^2   Type & Screen    Collection Time: 09/02/22  9:16 PM   Result Value Ref Range    Group & Rh O POS     Indirect Barbi NEG    Protime-INR    Collection Time: 09/02/22  9:16 PM   Result Value Ref Range    Prothrombin Time 10.8 9.0 - 12.5 sec    INR 1.0 0.8 - 1.2   Lipase    Collection Time: 09/02/22  9:16 PM   Result Value Ref Range    Lipase 27 4 - 60 U/L   HIV 1/2 Ag/Ab (4th Gen)    Collection Time: 09/02/22  9:16 PM   Result Value Ref Range     HIV 1/2 Ag/Ab Non-reactive Non-reactive   Hepatitis C Antibody    Collection Time: 09/02/22  9:16 PM   Result Value Ref Range    Hepatitis C Ab Non-reactive Non-reactive   COVID-19 Rapid Screening    Collection Time: 09/03/22  1:32 AM   Result Value Ref Range    SARS-CoV-2 RNA, Amplification, Qual Negative Negative   CBC auto differential    Collection Time: 09/03/22  3:51 AM   Result Value Ref Range    WBC 6.04 3.90 - 12.70 K/uL    RBC 3.72 (L) 4.00 - 5.40 M/uL    Hemoglobin 11.9 (L) 12.0 - 16.0 g/dL    Hematocrit 35.2 (L) 37.0 - 48.5 %    MCV 95 82 - 98 fL    MCH 32.0 (H) 27.0 - 31.0 pg    MCHC 33.8 32.0 - 36.0 g/dL    RDW 13.3 11.5 - 14.5 %    Platelets 164 150 - 450 K/uL    MPV 10.2 9.2 - 12.9 fL    Immature Granulocytes 0.8 (H) 0.0 - 0.5 %    Gran # (ANC) 3.6 1.8 - 7.7 K/uL    Immature Grans (Abs) 0.05 (H) 0.00 - 0.04 K/uL    Lymph # 1.8 1.0 - 4.8 K/uL    Mono # 0.5 0.3 - 1.0 K/uL    Eos # 0.1 0.0 - 0.5 K/uL    Baso # 0.05 0.00 - 0.20 K/uL    nRBC 0 0 /100 WBC    Gran % 59.2 38.0 - 73.0 %    Lymph % 29.6 18.0 - 48.0 %    Mono % 7.8 4.0 - 15.0 %    Eosinophil % 1.8 0.0 - 8.0 %    Basophil % 0.8 0.0 - 1.9 %    Differential Method Automated          Significant Imaging: I have reviewed all pertinent imaging results/findings within the past 24 hours.    Assessment/Plan:     BRBPR (bright red blood per rectum)  -had 2 episode at home prior to arrival   -due to internal hemorrhoidal bleed as CTA abdomen showed hemorrhoid with arterial enhancement  -colonoscopy in 2018 showed internal hemorrhoid and 6 mm cecal polyp resected   -denies abdominal pain and abdomen benign on exam   -no further episode of bleeding   -Hgb stable at 13 and remained hemodynamically stable   -ED consulted CRS with pending evaluation in am       History of seizures  -no recent seizure   -well controlled on keppra and will continue       Mixed hyperlipidemia  -continue home statin       Benign essential hypertension  -chronic and controlled    -continue home lisinopril       VTE Risk Mitigation (From admission, onward)         Ordered     IP VTE LOW RISK PATIENT  Once         09/03/22 0317                   Laura Durham DO  Department of Hospital Medicine   [facility] - Emergency Dept

## 2022-09-03 NOTE — SUBJECTIVE & OBJECTIVE
Past Medical History:   Diagnosis Date    Anxiety and depression 3/21/2018    Benign essential hypertension 3/21/2018    Chronic bilateral low back pain without sciatica 3/21/2018    History of stroke 3/21/2018    Memory loss     Mixed hyperlipidemia 3/21/2018    Seizures     Tobacco abuse 3/21/2018       Past Surgical History:   Procedure Laterality Date    ANTERIOR VAGINAL REPAIR      CATARACT EXTRACTION Right 10/16/2007    Dr. Ramsey //yag 5/1/18 Dr. Bentley    CATARACT EXTRACTION W/  INTRAOCULAR LENS IMPLANT Left 10/30/2007    Dr. Ramsey// yag 5/1/18 Dr. Bentley    CHOLECYSTECTOMY      COLONOSCOPY N/A 4/19/2018    Procedure: COLONOSCOPY;  Surgeon: Yi Goncalves MD;  Location: [facility] ENDO;  Service: Endoscopy;  Laterality: N/A;    EXCISION OF PAROTID GLAND Left 7/8/2022    Procedure: EXCISION, PAROTID GLAND;  Surgeon: Kit Conley MD;  Location: [facility] OR [address] [place] [address];  Service: ENT;  Laterality: Left;    EYE SURGERY      ROBOT-ASSISTED LAPAROSCOPIC REPAIR OF VENTRAL HERNIA N/A 6/28/2021    Procedure: ROBOTIC REPAIR, HERNIA, VENTRAL;  Surgeon: Alli Dumont Jr., MD;  Location: [facility] OR;  Service: General;  Laterality: N/A;    TONSILLECTOMY      UMBILICAL HERNIA REPAIR N/A 2/8/2019    Procedure: REPAIR, HERNIA, UMBILICAL, AGE 5 YEARS OR OLDER, open with/without mesh;  Surgeon: Magno Mishra MD;  Location: [facility] OR [address] [place] [address];  Service: General;  Laterality: N/A;       Review of patient's allergies indicates:   Allergen Reactions    Versed [midazolam]      IV Versed altered mental status       No current facility-administered medications on file prior to encounter.     Current Outpatient Medications on File Prior to Encounter   Medication Sig    ALPRAZolam (XANAX) 0.5 MG tablet TAKE 1 TABLET BY MOUTH EVERY DAY AS NEEDED FOR INSOMNIA OR ANXIETY (PANIC ATTACK) (Patient taking differently: Take 0.5 mg by mouth daily as needed for Insomnia or Anxiety (or panic attack).)    aspirin 81 MG Chew Take 1 tablet (81 mg  total) by mouth every evening.    atorvastatin (LIPITOR) 80 MG tablet TAKE 1 TABLET BY MOUTH EVERY DAY (Patient taking differently: Take 80 mg by mouth once daily.)    ergocalciferol (ERGOCALCIFEROL) 50,000 unit Cap Take 1 capsule (50,000 Units total) by mouth every Sunday.    HYDROcodone-acetaminophen (NORCO) 5-325 mg per tablet Take 1 tablet by mouth every 6 (six) hours as needed for Pain.    levETIRAcetam (KEPPRA) 500 MG Tab TAKE 1 TABLET BY MOUTH TWICE A DAY (Patient taking differently: Take 500 mg by mouth 2 (two) times daily. DO NOT CRUSH OR CHEW; SWALLOW WHOLE.)    lisinopriL 10 MG tablet Take 1 tablet (10 mg total) by mouth once daily.    multivit-min/iron/folic/lutein (MULTIVITAMIN WOMEN 50 PLUS ORAL) Take 1 tablet by mouth once daily.    ondansetron (ZOFRAN-ODT) 8 MG TbDL Dissolve 1 tablet (8 mg total) by mouth every 6 (six) hours as needed (nausea).    sars-cov-2, covid-19, (MODERNA COVID-19) 100 mcg/0.5 ml injection     [DISCONTINUED] mirtazapine (REMERON) 7.5 MG Tab Take 1-2 tabs nightly for help with insomnia and appetite stimulation     Family History    None       Tobacco Use    Smoking status: Light Smoker     Types: Cigarettes     Last attempt to quit: 5/5/2019     Years since quitting: 3.3    Smokeless tobacco: Never    Tobacco comments:     started smoking again in 12/2020(when nervous/bored)   Substance and Sexual Activity    Alcohol use: Yes     Comment: 1-2 drinks daily    Drug use: Yes     Types: Marijuana     Comment: intermittent to take the edge off of her anxiety    Sexual activity: Never     Review of Systems   Constitutional:  Negative for activity change, appetite change, chills, diaphoresis, fatigue and fever.   HENT:  Negative for congestion, dental problem, drooling, ear discharge, ear pain, facial swelling, hearing loss, mouth sores, nosebleeds, postnasal drip, rhinorrhea, sinus pressure, sneezing, sore throat, tinnitus, trouble swallowing and voice change.    Eyes:  Negative for  photophobia, pain, discharge, redness, itching and visual disturbance.   Respiratory:  Negative for apnea, cough, choking, chest tightness, shortness of breath, wheezing and stridor.    Cardiovascular:  Negative for chest pain, palpitations and leg swelling.   Gastrointestinal:  Positive for anal bleeding. Negative for abdominal distention, abdominal pain, blood in stool, constipation, diarrhea, nausea, rectal pain and vomiting.   Endocrine: Negative for cold intolerance, heat intolerance, polydipsia, polyphagia and polyuria.   Genitourinary:  Negative for decreased urine volume, difficulty urinating, dyspareunia, dysuria, enuresis, flank pain, frequency, genital sores, hematuria, menstrual problem, pelvic pain, urgency, vaginal bleeding, vaginal discharge and vaginal pain.   Musculoskeletal:  Negative for arthralgias, back pain, gait problem, joint swelling, myalgias, neck pain and neck stiffness.   Skin:  Negative for color change, pallor, rash and wound.   Allergic/Immunologic: Negative for environmental allergies, food allergies and immunocompromised state.   Neurological:  Negative for dizziness, tremors, seizures, syncope, facial asymmetry, speech difficulty, weakness, light-headedness, numbness and headaches.   Hematological:  Negative for adenopathy. Does not bruise/bleed easily.   Psychiatric/Behavioral:  Negative for agitation, behavioral problems, confusion, decreased concentration, dysphoric mood, hallucinations, self-injury, sleep disturbance and suicidal ideas. The patient is not nervous/anxious and is not hyperactive.    Objective:     Vital Signs (Most Recent):  Temp: 97.7 °F (36.5 °C) (09/03/22 0352)  Pulse: 68 (09/03/22 0352)  Resp: 19 (09/03/22 0352)  BP: 121/69 (09/03/22 0352)  SpO2: 98 % (09/03/22 0352) Vital Signs (24h Range):  Temp:  [97.6 °F (36.4 °C)-97.7 °F (36.5 °C)] 97.7 °F (36.5 °C)  Pulse:  [68-93] 68  Resp:  [18-19] 19  SpO2:  [97 %-98 %] 98 %  BP: (106-124)/(59-69) 121/69     Weight:  43.1 kg (95 lb)  Body mass index is 19.19 kg/m².    Physical Exam  Constitutional:       General: She is not in acute distress.     Appearance: She is well-developed. She is not diaphoretic.   HENT:      Head: Normocephalic and atraumatic.      Right Ear: External ear normal.      Left Ear: External ear normal.      Nose: Nose normal.      Mouth/Throat:      Pharynx: No oropharyngeal exudate.   Eyes:      General: No scleral icterus.     Conjunctiva/sclera: Conjunctivae normal.      Pupils: Pupils are equal, round, and reactive to light.   Neck:      Thyroid: No thyromegaly.      Vascular: No JVD.      Trachea: No tracheal deviation.   Cardiovascular:      Rate and Rhythm: Normal rate and regular rhythm.      Heart sounds: Normal heart sounds. No murmur heard.    No gallop.   Pulmonary:      Effort: Pulmonary effort is normal. No respiratory distress.      Breath sounds: Normal breath sounds. No wheezing or rales.   Chest:      Chest wall: No tenderness.   Abdominal:      General: Bowel sounds are normal. There is no distension.      Palpations: Abdomen is soft. There is no mass.      Tenderness: There is no abdominal tenderness. There is no guarding or rebound.   Genitourinary:     Vagina: No vaginal discharge.   Musculoskeletal:         General: No tenderness.      Cervical back: Neck supple.   Lymphadenopathy:      Cervical: No cervical adenopathy.   Skin:     General: Skin is warm and dry.      Coloration: Skin is not pale.      Findings: No erythema or rash.   Neurological:      Mental Status: She is alert and oriented to person, place, and time.      Cranial Nerves: No cranial nerve deficit.      Motor: No abnormal muscle tone.      Coordination: Coordination normal.      Deep Tendon Reflexes: Reflexes are normal and symmetric. Reflexes normal.      Comments: Mild tremors of upper extremities    Psychiatric:         Behavior: Behavior normal.         Thought Content: Thought content normal.         Judgment:  Judgment normal.      Comments: Flat affect          CRANIAL NERVES     CN III, IV, VI   Pupils are equal, round, and reactive to light.     Significant Labs: All pertinent labs within the past 24 hours have been reviewed.  None    Recent Results (from the past 24 hour(s))   CBC auto differential    Collection Time: 09/02/22  9:16 PM   Result Value Ref Range    WBC 9.79 3.90 - 12.70 K/uL    RBC 4.34 4.00 - 5.40 M/uL    Hemoglobin 13.6 12.0 - 16.0 g/dL    Hematocrit 40.1 37.0 - 48.5 %    MCV 92 82 - 98 fL    MCH 31.3 (H) 27.0 - 31.0 pg    MCHC 33.9 32.0 - 36.0 g/dL    RDW 13.1 11.5 - 14.5 %    Platelets 210 150 - 450 K/uL    MPV 10.1 9.2 - 12.9 fL    Immature Granulocytes 0.8 (H) 0.0 - 0.5 %    Gran # (ANC) 6.3 1.8 - 7.7 K/uL    Immature Grans (Abs) 0.08 (H) 0.00 - 0.04 K/uL    Lymph # 2.5 1.0 - 4.8 K/uL    Mono # 0.7 0.3 - 1.0 K/uL    Eos # 0.1 0.0 - 0.5 K/uL    Baso # 0.07 0.00 - 0.20 K/uL    nRBC 0 0 /100 WBC    Gran % 64.4 38.0 - 73.0 %    Lymph % 25.6 18.0 - 48.0 %    Mono % 7.4 4.0 - 15.0 %    Eosinophil % 1.1 0.0 - 8.0 %    Basophil % 0.7 0.0 - 1.9 %    Differential Method Automated    Comprehensive metabolic panel    Collection Time: 09/02/22  9:16 PM   Result Value Ref Range    Sodium 140 136 - 145 mmol/L    Potassium 4.0 3.5 - 5.1 mmol/L    Chloride 107 95 - 110 mmol/L    CO2 21 (L) 23 - 29 mmol/L    Glucose 101 70 - 110 mg/dL    BUN 8 8 - 23 mg/dL    Creatinine 0.9 0.5 - 1.4 mg/dL    Calcium 10.1 8.7 - 10.5 mg/dL    Total Protein 7.8 6.0 - 8.4 g/dL    Albumin 4.5 3.5 - 5.2 g/dL    Total Bilirubin 1.1 (H) 0.1 - 1.0 mg/dL    Alkaline Phosphatase 82 55 - 135 U/L    AST 35 10 - 40 U/L    ALT 31 10 - 44 U/L    Anion Gap 12 8 - 16 mmol/L    eGFR >60.0 >60 mL/min/1.73 m^2   Type & Screen    Collection Time: 09/02/22  9:16 PM   Result Value Ref Range    Group & Rh O POS     Indirect Barbi NEG    Protime-INR    Collection Time: 09/02/22  9:16 PM   Result Value Ref Range    Prothrombin Time 10.8 9.0 - 12.5 sec     INR 1.0 0.8 - 1.2   Lipase    Collection Time: 09/02/22  9:16 PM   Result Value Ref Range    Lipase 27 4 - 60 U/L   HIV 1/2 Ag/Ab (4th Gen)    Collection Time: 09/02/22  9:16 PM   Result Value Ref Range    HIV 1/2 Ag/Ab Non-reactive Non-reactive   Hepatitis C Antibody    Collection Time: 09/02/22  9:16 PM   Result Value Ref Range    Hepatitis C Ab Non-reactive Non-reactive   COVID-19 Rapid Screening    Collection Time: 09/03/22  1:32 AM   Result Value Ref Range    SARS-CoV-2 RNA, Amplification, Qual Negative Negative   CBC auto differential    Collection Time: 09/03/22  3:51 AM   Result Value Ref Range    WBC 6.04 3.90 - 12.70 K/uL    RBC 3.72 (L) 4.00 - 5.40 M/uL    Hemoglobin 11.9 (L) 12.0 - 16.0 g/dL    Hematocrit 35.2 (L) 37.0 - 48.5 %    MCV 95 82 - 98 fL    MCH 32.0 (H) 27.0 - 31.0 pg    MCHC 33.8 32.0 - 36.0 g/dL    RDW 13.3 11.5 - 14.5 %    Platelets 164 150 - 450 K/uL    MPV 10.2 9.2 - 12.9 fL    Immature Granulocytes 0.8 (H) 0.0 - 0.5 %    Gran # (ANC) 3.6 1.8 - 7.7 K/uL    Immature Grans (Abs) 0.05 (H) 0.00 - 0.04 K/uL    Lymph # 1.8 1.0 - 4.8 K/uL    Mono # 0.5 0.3 - 1.0 K/uL    Eos # 0.1 0.0 - 0.5 K/uL    Baso # 0.05 0.00 - 0.20 K/uL    nRBC 0 0 /100 WBC    Gran % 59.2 38.0 - 73.0 %    Lymph % 29.6 18.0 - 48.0 %    Mono % 7.8 4.0 - 15.0 %    Eosinophil % 1.8 0.0 - 8.0 %    Basophil % 0.8 0.0 - 1.9 %    Differential Method Automated          Significant Imaging: I have reviewed all pertinent imaging results/findings within the past 24 hours.

## 2022-09-06 NOTE — ASSESSMENT & PLAN NOTE
- had 2 episode at home prior to arrival   - due to internal hemorrhoidal bleed as CTA abdomen showed hemorrhoid with arterial enhancement  - colonoscopy in 2018 showed internal hemorrhoid and 6 mm cecal polyp resected   - denies abdominal pain and abdomen benign on exam   - no further episode of bleeding   - Hgb stable at 13 and remained hemodynamically stable   - ED consulted CRS with pending evaluation in am, no surgical intervention at this time, stable d/c  - follow-up with CRS outpatient for elective hemorrhoid banding

## 2022-09-06 NOTE — DISCHARGE SUMMARY
[facility] Emergency Dept  Hospital Medicine  Discharge Summary      Patient Name: Gena Lui  MRN: [id number]  Patient Class: OP- Observation  Admission Date: 9/2/2022  Hospital Length of Stay: 0 days  Discharge Date and Time: 9/3/2022  2:34 PM  Attending Physician: No att. providers found   Discharging Provider: Penny Davis PA-C  Primary Care Provider: Rachelle Flores MD  [facility] Medicine Team: [facility]  Penny Davis PA-C    HPI:   Ms. Lui is a 66-year-old female with a history of anxiety, hypertension, hyperlipidemia, seizure disorder, CVA on 81 mg aspirin daily who presented to the emergency department for evaluation of BRBPR prior to coming to ED. Patient states she went to have bowel movement yesterday evening after coming from work. She notices bright red blood on the toilet without any stool. She has another similar episode about half an hour later when she called [facility] on call nurse who advised her to come to ED. She denies abdominal pain, N/V/D, fever, chills, chest pain, sob, dizziness or palpitation. She reports intermittent constipation but not in last few days. Patient states she had screening colonoscopy in 2018 which showed internal hemorrhoid and 6 mm cecal polyp which was resected.     In ED patient is afebrile and vitals stable. No further episode of bleeding since came to ED. Rectal exam in ED showed gross red blood. CTA abdomen negative for active hemorrhage, but showed internal hemorrhoid with arterial enhancement. ED consulted CRS who recommended observation pending evaluation in morning.         * No surgery found *      Hospital Course:   Patient placed in observation for GI bleed. CTA performed in the ED without evidence of active bleeding. CRS consulted, no indication for urgent surgical intervention as bleeding as resolved. Plan for follow-up outpatient for elective hemorrhoid banding. Hb stable, no other episodes of BRBPR. Patient stable for discharge. Recommend  initiating a stool softener daily to prevent straining. Instructed patient to follow-up with PCP within 1-2 weeks. Return precautions given and patient verbalized understanding. All questions answered.       Goals of Care Treatment Preferences:  Code Status: Full Code      Consults:   Consults (From admission, onward)        Status Ordering Provider     Inpatient consult to Colorectal Surgery  Once        Provider:  (Not yet assigned)    Completed MINESH REYES          * BRBPR (bright red blood per rectum)  - had 2 episode at home prior to arrival   - due to internal hemorrhoidal bleed as CTA abdomen showed hemorrhoid with arterial enhancement  - colonoscopy in 2018 showed internal hemorrhoid and 6 mm cecal polyp resected   - denies abdominal pain and abdomen benign on exam   - no further episode of bleeding   - Hgb stable at 13 and remained hemodynamically stable   - ED consulted CRS with pending evaluation in am, no surgical intervention at this time, stable d/c  - follow-up with CRS outpatient for elective hemorrhoid banding       History of seizures  - no recent seizure   - well controlled on keppra and will continue       Mixed hyperlipidemia  - continue home statin       Benign essential hypertension  - chronic and controlled   - continue home lisinopril         Final Active Diagnoses:    Diagnosis Date Noted POA    PRINCIPAL PROBLEM:  BRBPR (bright red blood per rectum) [K62.5] 09/03/2022 Yes    Mixed hyperlipidemia [E78.2] 03/21/2018 Yes    Benign essential hypertension [I10] 03/21/2018 Yes    History of seizures [Z87.898] 03/21/2018 Not Applicable      Problems Resolved During this Admission:       Discharged Condition: good    Disposition: Home or Self Care    Follow Up:    Patient Instructions:      Ambulatory referral/consult to Colorectal Surgery   Standing Status: Future   Referral Priority: Urgent Referral Type: Consultation   Referral Reason: Specialty Services Required   Requested Specialty: Colon  and Rectal Surgery   Number of Visits Requested: 1     Diet Adult Regular     Notify your health care provider if you experience any of the following:  persistent dizziness, light-headedness, or visual disturbances     Notify your health care provider if you experience any of the following:  severe uncontrolled pain     No dressing needed     Activity as tolerated       Pending Diagnostic Studies:     None         Medications:  Reconciled Home Medications:      Medication List      CHANGE how you take these medications    ALPRAZolam 0.5 MG tablet  Commonly known as: XANAX  TAKE 1 TABLET BY MOUTH EVERY DAY AS NEEDED FOR INSOMNIA OR ANXIETY (PANIC ATTACK)  What changed: See the new instructions.     levETIRAcetam 500 MG Tab  Commonly known as: KEPPRA  TAKE 1 TABLET BY MOUTH TWICE A DAY  What changed:   · how much to take  · how to take this  · when to take this  · additional instructions        CONTINUE taking these medications    aspirin 81 MG Chew  Take 1 tablet (81 mg total) by mouth every evening.     atorvastatin 80 MG tablet  Commonly known as: LIPITOR  TAKE 1 TABLET BY MOUTH EVERY DAY     calcium carbonate 600 mg calcium (1,500 mg) Tab  Commonly known as: OS-LV  Take 600 mg by mouth every morning. (2 hours after taking Magnesium)     ergocalciferol 50,000 unit Cap  Commonly known as: ERGOCALCIFEROL  Take 1 capsule (50,000 Units total) by mouth every Sunday.     lisinopriL 10 MG tablet  Take 1 tablet (10 mg total) by mouth once daily.     magnesium oxide 400 mg (241.3 mg magnesium) tablet  Commonly known as: MAG-OX  Take 400 mg by mouth every morning.     MULTIVITAMIN WOMEN 50 PLUS ORAL  Take 1 tablet by mouth once daily.        STOP taking these medications    HYDROcodone-acetaminophen 5-325 mg per tablet  Commonly known as: NORCO     mirtazapine 7.5 MG Tab  Commonly known as: REMERON     sars-cov-2 (covid-19) 100 mcg/0.5 ml injection  Commonly known as: MODERNA COVID-19            Indwelling Lines/Drains at  time of discharge:   Lines/Drains/Airways     None                 Time spent on the discharge of patient: 35 minutes         Penny Davis PA-C  Department of Hospital Medicine  [facility] - Emergency Dept

## 2022-09-13 ENCOUNTER — TELEPHONE (OUTPATIENT)
Dept: FAMILY MEDICINE | Facility: CLINIC | Age: 67
End: 2022-09-13
Payer: MEDICARE

## 2022-09-13 DIAGNOSIS — R19.4 CHANGE IN BOWEL HABIT: ICD-10-CM

## 2022-09-13 DIAGNOSIS — Z12.11 SCREENING FOR MALIGNANT NEOPLASM OF COLON: Primary | ICD-10-CM

## 2022-09-13 NOTE — TELEPHONE ENCOUNTER
Returned patient call in regards to her needing a referral for a colonoscopy. Patient didn't answer but I left a message saying I will send a message to Dr. Flores letting her know you are requesting orders placed for a colonoscopy. Please advise.

## 2022-09-13 NOTE — TELEPHONE ENCOUNTER
----- Message from Clarita Frazier sent at 9/13/2022 11:06 AM CDT -----  Type:  Needs Medical Advice    Who Called: Pt  Symptoms (please be specific): Pt is call in in regards to a referral for a colonoscopy   Would the patient rather a call back or a response via [facility]? Please call  Best Call Back Number: [phone]   Additional Information: Pt is not understanding the info she is seeing in [employer]

## 2022-09-15 ENCOUNTER — TELEPHONE (OUTPATIENT)
Dept: ENDOSCOPY | Facility: HOSPITAL | Age: 67
End: 2022-09-15
Payer: MEDICARE

## 2022-09-15 ENCOUNTER — TELEPHONE (OUTPATIENT)
Dept: FAMILY MEDICINE | Facility: CLINIC | Age: 67
End: 2022-09-15
Payer: MEDICARE

## 2022-09-15 RX ORDER — POLYETHYLENE GLYCOL 3350, SODIUM SULFATE ANHYDROUS, SODIUM BICARBONATE, SODIUM CHLORIDE, POTASSIUM CHLORIDE 236; 22.74; 6.74; 5.86; 2.97 G/4L; G/4L; G/4L; G/4L; G/4L
4 POWDER, FOR SOLUTION ORAL ONCE
Qty: 4000 ML | Refills: 0 | Status: SHIPPED | OUTPATIENT
Start: 2022-09-15 | End: 2022-09-15

## 2022-09-15 NOTE — TELEPHONE ENCOUNTER
Returned patient call in regards to questions she have about an appointment for her colonoscopy and her gastro appointment. Patient was informed to call her gastro doctor to find out more information. Patient verbalized understanding.

## 2022-09-15 NOTE — TELEPHONE ENCOUNTER
Endoscopy Scheduling Questionnaire:         Are you taking any blood thinners? n               If Yes  Have you been on them for longer than one year?     2. Have you been diagnosed with Diverticulitis in past three months?      3. Are you on dialysis or have Kidney Disease? n    4. Previous Colonoscopy?  y         If yes Do you have a history of colon polyps?  y      6. Are you a diabetic? n    7. Do you have a history of constipation?  n      Procedure scheduled with Dr. GISSELLE Sifuentes on  09/20/2022    The prep being used is Golytely     The patient's prep instructions were sent by [employer]

## 2022-09-15 NOTE — TELEPHONE ENCOUNTER
----- Message from Sydnie Garcia sent at 9/15/2022 11:26 AM CDT -----  Type:  Patient Returning Call    Who Called:pt  Who Left Message for Patient:Pham  Does the patient know what this is regarding?:  Would the patient rather a call back or a response via [employer]? call  Best Call Back Number:[phone]  Additional Information:

## 2022-09-20 ENCOUNTER — ANESTHESIA EVENT (OUTPATIENT)
Dept: ENDOSCOPY | Facility: HOSPITAL | Age: 67
End: 2022-09-20
Payer: MEDICARE

## 2022-09-20 ENCOUNTER — HOSPITAL ENCOUNTER (OUTPATIENT)
Facility: HOSPITAL | Age: 67
Discharge: HOME | End: 2022-09-20
Attending: INTERNAL MEDICINE | Admitting: INTERNAL MEDICINE
Payer: MEDICARE

## 2022-09-20 ENCOUNTER — ANESTHESIA (OUTPATIENT)
Dept: ENDOSCOPY | Facility: HOSPITAL | Age: 67
End: 2022-09-20
Payer: MEDICARE

## 2022-09-20 VITALS
TEMPERATURE: 97 F | BODY MASS INDEX: 18.46 KG/M2 | DIASTOLIC BLOOD PRESSURE: 55 MMHG | HEART RATE: 65 BPM | OXYGEN SATURATION: 99 % | RESPIRATION RATE: 20 BRPM | WEIGHT: 94 LBS | HEIGHT: 60 IN | SYSTOLIC BLOOD PRESSURE: 120 MMHG

## 2022-09-20 DIAGNOSIS — Z12.11 ENCOUNTER FOR SCREENING COLONOSCOPY: ICD-10-CM

## 2022-09-20 PROCEDURE — 88305 TISSUE EXAM BY PATHOLOGIST: CPT | Performed by: PATHOLOGY

## 2022-09-20 PROCEDURE — 25000003 PHARM REV CODE 250: Performed by: NURSE ANESTHETIST, CERTIFIED REGISTERED

## 2022-09-20 PROCEDURE — 45385 COLONOSCOPY W/LESION REMOVAL: CPT | Performed by: INTERNAL MEDICINE

## 2022-09-20 PROCEDURE — 88305 TISSUE EXAM BY PATHOLOGIST: CPT | Mod: 26,,, | Performed by: PATHOLOGY

## 2022-09-20 PROCEDURE — 63600175 PHARM REV CODE 636 W HCPCS: Performed by: NURSE ANESTHETIST, CERTIFIED REGISTERED

## 2022-09-20 PROCEDURE — 45385 PR COLONOSCOPY,REMV LESN,SNARE: ICD-10-PCS | Mod: ,,, | Performed by: INTERNAL MEDICINE

## 2022-09-20 PROCEDURE — 27201089 HC SNARE, DISP (ANY): Performed by: INTERNAL MEDICINE

## 2022-09-20 PROCEDURE — 45385 COLONOSCOPY W/LESION REMOVAL: CPT | Mod: ,,, | Performed by: INTERNAL MEDICINE

## 2022-09-20 PROCEDURE — 25000003 PHARM REV CODE 250: Performed by: INTERNAL MEDICINE

## 2022-09-20 PROCEDURE — 88305 TISSUE EXAM BY PATHOLOGIST: ICD-10-PCS | Mod: 26,,, | Performed by: PATHOLOGY

## 2022-09-20 PROCEDURE — 37000009 HC ANESTHESIA EA ADD 15 MINS: Performed by: INTERNAL MEDICINE

## 2022-09-20 PROCEDURE — 27200997: Performed by: INTERNAL MEDICINE

## 2022-09-20 PROCEDURE — 37000008 HC ANESTHESIA 1ST 15 MINUTES: Performed by: INTERNAL MEDICINE

## 2022-09-20 RX ORDER — SODIUM CHLORIDE 9 MG/ML
INJECTION, SOLUTION INTRAVENOUS CONTINUOUS
Status: DISCONTINUED | OUTPATIENT
Start: 2022-09-20 | End: 2022-09-20 | Stop reason: HOSPADM

## 2022-09-20 RX ORDER — SODIUM CHLORIDE 0.9 % (FLUSH) 0.9 %
10 SYRINGE (ML) INJECTION
Status: DISCONTINUED | OUTPATIENT
Start: 2022-09-20 | End: 2022-09-20 | Stop reason: HOSPADM

## 2022-09-20 RX ORDER — PROPOFOL 10 MG/ML
VIAL (ML) INTRAVENOUS CONTINUOUS PRN
Status: DISCONTINUED | OUTPATIENT
Start: 2022-09-20 | End: 2022-09-20

## 2022-09-20 RX ORDER — PROPOFOL 10 MG/ML
VIAL (ML) INTRAVENOUS
Status: DISCONTINUED | OUTPATIENT
Start: 2022-09-20 | End: 2022-09-20

## 2022-09-20 RX ORDER — LIDOCAINE HYDROCHLORIDE 20 MG/ML
INJECTION, SOLUTION EPIDURAL; INFILTRATION; INTRACAUDAL; PERINEURAL
Status: DISCONTINUED | OUTPATIENT
Start: 2022-09-20 | End: 2022-09-20

## 2022-09-20 RX ADMIN — LIDOCAINE HYDROCHLORIDE 50 MG: 20 INJECTION, SOLUTION EPIDURAL; INFILTRATION; INTRACAUDAL; PERINEURAL at 09:09

## 2022-09-20 RX ADMIN — SODIUM CHLORIDE: 0.9 INJECTION, SOLUTION INTRAVENOUS at 08:09

## 2022-09-20 RX ADMIN — PROPOFOL 70 MG: 10 INJECTION, EMULSION INTRAVENOUS at 09:09

## 2022-09-20 RX ADMIN — GLYCOPYRROLATE 0.2 MG: 0.2 INJECTION, SOLUTION INTRAMUSCULAR; INTRAVITREAL at 09:09

## 2022-09-20 RX ADMIN — PROPOFOL 150 MCG/KG/MIN: 10 INJECTION, EMULSION INTRAVENOUS at 09:09

## 2022-09-20 NOTE — H&P
History & Physical - Short Stay  Gastroenterology      SUBJECTIVE:     Procedure: Colonoscopy    Chief Complaint/Indication for Procedure: BRBPR    History of Present Illness:  Patient is a 66 y.o. female coming for BRBPR    PTA Medications   Medication Sig    ALPRAZolam (XANAX) 0.5 MG tablet TAKE 1 TABLET BY MOUTH EVERY DAY AS NEEDED FOR INSOMNIA OR ANXIETY (PANIC ATTACK) (Patient taking differently: Take 0.5 mg by mouth daily as needed for Insomnia or Anxiety (or panic attack).)    aspirin 81 MG Chew Take 1 tablet (81 mg total) by mouth every evening.    atorvastatin (LIPITOR) 80 MG tablet TAKE 1 TABLET BY MOUTH EVERY DAY    calcium carbonate (OS-LV) 600 mg calcium (1,500 mg) Tab Take 600 mg by mouth every morning. (2 hours after taking Magnesium)    ergocalciferol (ERGOCALCIFEROL) 50,000 unit Cap Take 1 capsule (50,000 Units total) by mouth every Sunday.    levETIRAcetam (KEPPRA) 500 MG Tab TAKE 1 TABLET BY MOUTH TWICE A DAY (Patient taking differently: Takes 500 mg once to twice daily. DO NOT CRUSH OR CHEW; SWALLOW WHOLE.)    lisinopriL 10 MG tablet Take 1 tablet (10 mg total) by mouth once daily.    magnesium oxide (MAG-OX) 400 mg (241.3 mg magnesium) tablet Take 400 mg by mouth every morning.    multivit-min/iron/folic/lutein (MULTIVITAMIN WOMEN 50 PLUS ORAL) Take 1 tablet by mouth once daily.       Review of patient's allergies indicates:   Allergen Reactions    Versed [midazolam]      IV Versed altered mental status        Past Medical History:   Diagnosis Date    Anxiety and depression 3/21/2018    Benign essential hypertension 3/21/2018    Chronic bilateral low back pain without sciatica 3/21/2018    History of stroke 3/21/2018    Memory loss     Mixed hyperlipidemia 3/21/2018    Seizures     Tobacco abuse 3/21/2018     Past Surgical History:   Procedure Laterality Date    ANTERIOR VAGINAL REPAIR      CATARACT EXTRACTION Right 10/16/2007    Dr. Ramsey //yag 5/1/18 Dr. Bentley    CATARACT EXTRACTION W/   INTRAOCULAR LENS IMPLANT Left 10/30/2007    Dr. Ramsey// yag 5/1/18 Dr. Bentley    CHOLECYSTECTOMY      COLONOSCOPY N/A 4/19/2018    Procedure: COLONOSCOPY;  Surgeon: Yi Goncalves MD;  Location: [facility] ENDO;  Service: Endoscopy;  Laterality: N/A;    EXCISION OF PAROTID GLAND Left 7/8/2022    Procedure: EXCISION, PAROTID GLAND;  Surgeon: Kti Conley MD;  [place] [facility] [place] [address] [place] [address];  Service: ENT;  Laterality: Left;    EYE SURGERY      ROBOT-ASSISTED LAPAROSCOPIC REPAIR OF VENTRAL HERNIA N/A 6/28/2021    Procedure: ROBOTIC REPAIR, HERNIA, VENTRAL;  Surgeon: Alli Dumont Jr., MD;  Location: [facility] OR;  Service: General;  Laterality: N/A;    TONSILLECTOMY      UMBILICAL HERNIA REPAIR N/A 2/8/2019    Procedure: REPAIR, HERNIA, UMBILICAL, AGE 5 YEARS OR OLDER, open with/without mesh;  Surgeon: Magno Mishra MD;  [place] [facility] [place] [address] [place] [address];  Service: General;  Laterality: N/A;     Family History   Problem Relation Age of Onset    Blindness Neg Hx     Glaucoma Neg Hx     Macular degeneration Neg Hx     Retinal detachment Neg Hx      Social History     Tobacco Use    Smoking status: Every Day     Types: Cigarettes     Last attempt to quit: 5/5/2019     Years since quitting: 3.3    Smokeless tobacco: Never    Tobacco comments:     started smoking again in 12/2020(when nervous/bored)   Substance Use Topics    Alcohol use: Yes     Comment: 2-3 week    Drug use: Yes     Types: Marijuana     Comment: intermittent to take the edge off of her anxiety          OBJECTIVE:     Vital Signs (Most Recent)  Temp: 98.8 °F (37.1 °C) (09/20/22 0839)  Pulse: 79 (09/20/22 0839)  Resp: 18 (09/20/22 0839)  BP: (!) 115/55 (09/20/22 0839)  SpO2: 99 % (09/20/22 0839)       ASSESSMENT/PLAN:       Patient is a 66 y.o. female coming for BRBPR    Plan: Colonoscopy    Anesthesia Plan: Moderate Sedation    ASA Grade: ASA 2 - Patient with mild systemic disease with no functional limitations

## 2022-09-20 NOTE — ANESTHESIA POSTPROCEDURE EVALUATION
Anesthesia Post Evaluation    Patient: Gena Lui    Procedure(s) Performed: Procedure(s) (LRB):  COLONOSCOPY Golytely Vaccinated (N/A)    Final Anesthesia Type: MAC      Patient location during evaluation: GI PACU  Patient participation: Yes- Able to Participate  Level of consciousness: awake and alert  Post-procedure vital signs: reviewed and stable  Pain management: adequate  Airway patency: patent    PONV status at discharge: No PONV  Anesthetic complications: no      Cardiovascular status: blood pressure returned to baseline  Respiratory status: unassisted and room air  Hydration status: euvolemic  Follow-up not needed.          Vitals Value Taken Time   /55 09/20/22 0839   Temp 37.1 °C (98.8 °F) 09/20/22 0839   Pulse 79 09/20/22 0839   Resp 18 09/20/22 0839   SpO2 99 % 09/20/22 0839         No case tracking events are documented in the log.      Pain/Nir Score: No data recorded

## 2022-09-20 NOTE — ANESTHESIA PREPROCEDURE EVALUATION
09/20/2022  Gena Lui is a 66 y.o., female for colonoscopy under MAC    Past Medical History:   Diagnosis Date    Anxiety and depression 3/21/2018    Benign essential hypertension 3/21/2018    Chronic bilateral low back pain without sciatica 3/21/2018    History of stroke 3/21/2018    Memory loss     Mixed hyperlipidemia 3/21/2018    Seizures     Tobacco abuse 3/21/2018     Past Surgical History:   Procedure Laterality Date    ANTERIOR VAGINAL REPAIR      CATARACT EXTRACTION Right 10/16/2007    Dr. Ramsey //yag 5/1/18 Dr. Bentley    CATARACT EXTRACTION W/  INTRAOCULAR LENS IMPLANT Left 10/30/2007    Dr. Ramsey// mark 5/1/18 Dr. Bentley    CHOLECYSTECTOMY      COLONOSCOPY N/A 4/19/2018    Procedure: COLONOSCOPY;  Surgeon: Yi Goncalves MD;  Location: [facility] ENDO;  Service: Endoscopy;  Laterality: N/A;    EXCISION OF PAROTID GLAND Left 7/8/2022    Procedure: EXCISION, PAROTID GLAND;  Surgeon: Kit Conley MD;  Location: [address];  Service: ENT;  Laterality: Left;    EYE SURGERY      ROBOT-ASSISTED LAPAROSCOPIC REPAIR OF VENTRAL HERNIA N/A 6/28/2021    Procedure: ROBOTIC REPAIR, HERNIA, VENTRAL;  Surgeon: Alli Dumont Jr., MD;  Location: [facility] OR;  Service: General;  Laterality: N/A;    TONSILLECTOMY      UMBILICAL HERNIA REPAIR N/A 2/8/2019    Procedure: REPAIR, HERNIA, UMBILICAL, AGE 5 YEARS OR OLDER, open with/without mesh;  Surgeon: Magno Mishra MD;  Location: [facility] OR [address] [place] [address];  Service: General;  Laterality: N/A;           Pre-op Assessment    I have reviewed the Patient Summary Reports.    I have reviewed the NPO Status.   I have reviewed the Medications.     Review of Systems  Social:  Smoker        Physical Exam  General: Well nourished    Airway:  Mallampati: II           Anesthesia Plan  Type of Anesthesia, risks & benefits discussed:    Anesthesia Type:  MAC  Intra-op Monitoring Plan: Standard ASA Monitors  Informed Consent: Informed consent signed with the Patient and all parties understand the risks and agree with anesthesia plan.  All questions answered.   ASA Score: 3    Ready For Surgery From Anesthesia Perspective.     .

## 2022-09-20 NOTE — PROVATION PATIENT INSTRUCTIONS
Discharge Summary/Instructions after an Endoscopic Procedure  Patient Name: Gena Lui  Patient MRN: [id number]  Patient YOB: 1955  Tuesday, September 20, 2022  Brook Sifuentes MD  Dear patient,  As a result of recent federal legislation (The Federal Cures Act), you may   receive lab or pathology results from your procedure in your [facility]   account before your physician is able to contact you. Your physician or   their representative will relay the results to you with their   recommendations at their soonest availability.  Thank you,  Your health is very important to us during the Covid Crisis. Following your   procedure today, you will receive a daily text for 2 weeks asking about   signs or symptoms of Covid 19.  Please respond to this text when you   receive it so we can follow up and keep you as safe as possible.   RESTRICTIONS:  During your procedure today, you received medications for sedation.  These   medications may affect your judgment, balance and coordination.  Therefore,   for 24 hours, you have the following restrictions:   - DO NOT drive a car, operate machinery, make legal/financial decisions,   sign important papers or drink alcohol.    ACTIVITY:  Today: no heavy lifting, straining or running due to procedural   sedation/anesthesia.  The following day: return to full activity including work.  DIET:  Eat and drink normally unless instructed otherwise.     TREATMENT FOR COMMON SIDE EFFECTS:  - Mild abdominal pain, nausea, belching, bloating or excessive gas:  rest,   eat lightly and use a heating pad.  - Sore Throat: treat with throat lozenges and/or gargle with warm salt   water.  - Because air was used during the procedure, expelling large amounts of air   from your rectum or belching is normal.  - If a bowel prep was taken, you may not have a bowel movement for 1-3 days.    This is normal.  SYMPTOMS TO WATCH FOR AND REPORT TO YOUR PHYSICIAN:  1. Abdominal pain or  bloating, other than gas cramps.  2. Chest pain.  3. Back pain.  4. Signs of infection such as: chills or fever occurring within 24 hours   after the procedure.  5. Rectal bleeding, which would show as bright red, maroon, or black stools.   (A tablespoon of blood from the rectum is not serious, especially if   hemorrhoids are present.)  6. Vomiting.  7. Weakness or dizziness.  GO DIRECTLY TO THE NEAREST EMERGENCY ROOM IF YOU HAVE ANY OF THE FOLLOWING:      Difficulty breathing              Chills and/or fever over 101 F   Persistent vomiting and/or vomiting blood   Severe abdominal pain   Severe chest pain   Black, tarry stools   Bleeding- more than one tablespoon   Any other symptom or condition that you feel may need urgent attention  Your doctor recommends these additional instructions:  If any biopsies were taken, your doctors clinic will contact you in 1 to 2   weeks with any results.  - Discharge patient to home.   - Resume previous diet.   - Continue present medications.   - Await pathology results.   - Repeat colonoscopy in 3 years for surveillance based on pathology results.     - Patient has a contact number available for emergencies.  The signs and   symptoms of potential delayed complications were discussed with the   patient.  Return to normal activities tomorrow.  Written discharge   instructions were provided to the patient.  For questions, problems or results please call your physician - Brook Sifuentes MD.  EMERGENCY PHONE NUMBER: [phone],  LAB RESULTS: [phone]  IF A COMPLICATION OR EMERGENCY SITUATION ARISES AND YOU ARE UNABLE TO REACH   YOUR PHYSICIAN - GO DIRECTLY TO THE EMERGENCY ROOM.  Brook Sifuentes MD  9/20/2022 10:21:58 AM  This report has been verified and signed electronically.  Dear patient,  As a result of recent federal legislation (The Federal Cures Act), you may   receive lab or pathology results from your procedure in your [facility]   account  before your physician is able to contact you. Your physician or   their representative will relay the results to you with their   recommendations at their soonest availability.  Thank you,  PROVATION

## 2022-09-20 NOTE — TRANSFER OF CARE
Anesthesia Transfer of Care Note    Patient: Gena Lui    Procedure(s) Performed: Procedure(s) (LRB):  COLONOSCOPY Golytely Vaccinated (N/A)    Patient location: GI    Anesthesia Type: MAC    Transport from OR: Transported from OR on room air with adequate spontaneous ventilation    Post pain: adequate analgesia    Post assessment: no apparent anesthetic complications    Post vital signs: stable    Level of consciousness: awake    Nausea/Vomiting: no nausea/vomiting    Complications: none    Transfer of care protocol was followed      Last vitals:   Visit Vitals  BP (!) 115/55 (BP Location: Left arm, Patient Position: Lying)   Pulse 79   Temp 37.1 °C (98.8 °F) (Temporal)   Resp 18   Ht 5' (1.524 m)   Wt 42.6 kg (94 lb)   LMP  (LMP Unknown)   SpO2 99%   Breastfeeding No   BMI 18.36 kg/m²

## 2022-09-21 ENCOUNTER — TELEPHONE (OUTPATIENT)
Dept: ENDOSCOPY | Facility: HOSPITAL | Age: 67
End: 2022-09-21
Payer: MEDICARE

## 2022-09-22 LAB
FINAL PATHOLOGIC DIAGNOSIS: NORMAL
GROSS: NORMAL
Lab: NORMAL

## 2022-10-18 ENCOUNTER — TELEPHONE (OUTPATIENT)
Dept: OTOLARYNGOLOGY | Facility: CLINIC | Age: 67
End: 2022-10-18
Payer: MEDICARE

## 2022-10-19 ENCOUNTER — TELEPHONE (OUTPATIENT)
Dept: OTOLARYNGOLOGY | Facility: CLINIC | Age: 67
End: 2022-10-19
Payer: MEDICARE

## 2022-10-19 NOTE — TELEPHONE ENCOUNTER
Called patient in regards to appointment coming up with Dr. Busch. Patient needed to be rescheduled due to provider being out of the office on unexpected medical leave for upcoming scheduled appointment.Offered patient first available at [facility] which was November 18. Patient requested the [facility]. Offered the patient 02/01/2023 and the patient decline appointment. Patient accepted first available in November. Was thanked for call

## 2022-11-18 ENCOUNTER — CLINICAL SUPPORT (OUTPATIENT)
Dept: AUDIOLOGY | Facility: CLINIC | Age: 67
End: 2022-11-18
Payer: MEDICARE

## 2022-11-18 ENCOUNTER — OFFICE VISIT (OUTPATIENT)
Dept: OTOLARYNGOLOGY | Facility: CLINIC | Age: 67
End: 2022-11-18
Payer: MEDICARE

## 2022-11-18 VITALS — BODY MASS INDEX: 17.57 KG/M2 | WEIGHT: 89.94 LBS

## 2022-11-18 DIAGNOSIS — H90.3 SENSORINEURAL HEARING LOSS (SNHL) OF BOTH EARS: ICD-10-CM

## 2022-11-18 DIAGNOSIS — H93.13 TINNITUS, BILATERAL: ICD-10-CM

## 2022-11-18 DIAGNOSIS — H91.90 HEARING LOSS, UNSPECIFIED HEARING LOSS TYPE, UNSPECIFIED LATERALITY: ICD-10-CM

## 2022-11-18 DIAGNOSIS — H90.3 SENSORINEURAL HEARING LOSS, BILATERAL: Primary | ICD-10-CM

## 2022-11-18 PROCEDURE — 3044F HG A1C LEVEL LT 7.0%: CPT | Mod: CPTII,S$GLB,, | Performed by: OTOLARYNGOLOGY

## 2022-11-18 PROCEDURE — 4010F PR ACE/ARB THEARPY RXD/TAKEN: ICD-10-PCS | Mod: CPTII,S$GLB,, | Performed by: OTOLARYNGOLOGY

## 2022-11-18 PROCEDURE — 1159F PR MEDICATION LIST DOCUMENTED IN MEDICAL RECORD: ICD-10-PCS | Mod: CPTII,S$GLB,, | Performed by: OTOLARYNGOLOGY

## 2022-11-18 PROCEDURE — 3044F PR MOST RECENT HEMOGLOBIN A1C LEVEL <7.0%: ICD-10-PCS | Mod: CPTII,S$GLB,, | Performed by: OTOLARYNGOLOGY

## 2022-11-18 PROCEDURE — 99999 PR PBB SHADOW E&M-EST. PATIENT-LVL III: CPT | Mod: PBBFAC,,, | Performed by: OTOLARYNGOLOGY

## 2022-11-18 PROCEDURE — 3008F BODY MASS INDEX DOCD: CPT | Mod: CPTII,S$GLB,, | Performed by: OTOLARYNGOLOGY

## 2022-11-18 PROCEDURE — 99999 PR PBB SHADOW E&M-EST. PATIENT-LVL III: ICD-10-PCS | Mod: PBBFAC,,, | Performed by: OTOLARYNGOLOGY

## 2022-11-18 PROCEDURE — 92557 PR COMPREHENSIVE HEARING TEST: ICD-10-PCS | Mod: S$GLB,,, | Performed by: AUDIOLOGIST

## 2022-11-18 PROCEDURE — 4010F ACE/ARB THERAPY RXD/TAKEN: CPT | Mod: CPTII,S$GLB,, | Performed by: OTOLARYNGOLOGY

## 2022-11-18 PROCEDURE — 99999 PR PBB SHADOW E&M-EST. PATIENT-LVL I: ICD-10-PCS | Mod: PBBFAC,,, | Performed by: AUDIOLOGIST

## 2022-11-18 PROCEDURE — 1159F MED LIST DOCD IN RCRD: CPT | Mod: CPTII,S$GLB,, | Performed by: OTOLARYNGOLOGY

## 2022-11-18 PROCEDURE — 92567 PR TYMPA2METRY: ICD-10-PCS | Mod: S$GLB,,, | Performed by: AUDIOLOGIST

## 2022-11-18 PROCEDURE — 1126F PR PAIN SEVERITY QUANTIFIED, NO PAIN PRESENT: ICD-10-PCS | Mod: CPTII,S$GLB,, | Performed by: OTOLARYNGOLOGY

## 2022-11-18 PROCEDURE — 92567 TYMPANOMETRY: CPT | Mod: S$GLB,,, | Performed by: AUDIOLOGIST

## 2022-11-18 PROCEDURE — 92557 COMPREHENSIVE HEARING TEST: CPT | Mod: S$GLB,,, | Performed by: AUDIOLOGIST

## 2022-11-18 PROCEDURE — 99999 PR PBB SHADOW E&M-EST. PATIENT-LVL I: CPT | Mod: PBBFAC,,, | Performed by: AUDIOLOGIST

## 2022-11-18 PROCEDURE — 99213 OFFICE O/P EST LOW 20 MIN: CPT | Mod: S$GLB,,, | Performed by: OTOLARYNGOLOGY

## 2022-11-18 PROCEDURE — 1126F AMNT PAIN NOTED NONE PRSNT: CPT | Mod: CPTII,S$GLB,, | Performed by: OTOLARYNGOLOGY

## 2022-11-18 PROCEDURE — 3008F PR BODY MASS INDEX (BMI) DOCUMENTED: ICD-10-PCS | Mod: CPTII,S$GLB,, | Performed by: OTOLARYNGOLOGY

## 2022-11-18 PROCEDURE — 99213 PR OFFICE/OUTPT VISIT, EST, LEVL III, 20-29 MIN: ICD-10-PCS | Mod: S$GLB,,, | Performed by: OTOLARYNGOLOGY

## 2022-11-18 NOTE — PROGRESS NOTES
Gena Lui, a 67 y.o. female, was seen today in the clinic for an audiologic evaluation.  The patient's main complaint was hearing loss.  Ms. Lui reported that she has had a hearing loss for over 20 years.  She thinks her last hearing test was approximately 15 years ago.  She has a right hearing aid that she obtained 15 years ago that she has never worn successfully.  She reported bilateral tinnitus.  She indicated an imbalance that she associates with a hip injury.  Pt denied otalgia and aural fullness.    Tympanometry revealed Type A in the right ear and Type As in the left ear. Audiogram results revealed mild sloping to moderate-severe sensorineural hearing loss (SNHL) in the right ear and mild sloping to moderate-severe SNHL in the left ear.  Speech reception thresholds were noted at 40 dB in the right ear and 45 dB in the left ear.  Speech discrimination scores were 88% in the right ear and 76% in the left ear.    Recommendations:  Otologic evaluation  Annual audiogram  Hearing protection when in noise  Hearing aid consultation

## 2022-11-18 NOTE — PROGRESS NOTES
Otology/Neurotology History and Physical     CC: Hearing Loss    HPI:  Gena Lui is a 67 y.o. female who was referred to me by Dr. Rachelel Flores in consultation for hearing loss.    Patient presents for evaluation of hearing loss. Reports she has had poor hearing for a number of years, and has had a unilateral hearing aid for 20 years, though she never wears it. On eval today, realized that the aid was programmed for the wrong ear. Denies any otorrhea or otalgia. No balance issue. Listened to loud music for a number of years.      Past Medical History  She has a past medical history of Anxiety and depression, Benign essential hypertension, Chronic bilateral low back pain without sciatica, History of stroke, Memory loss, Mixed hyperlipidemia, Seizures, and Tobacco abuse.    Past Surgical History  She has a past surgical history that includes Eye surgery; Tonsillectomy; Cholecystectomy; Anterior vaginal repair; Colonoscopy (N/A, 4/19/2018); Cataract extraction (Right, 10/16/2007); Cataract extraction w/  intraocular lens implant (Left, 10/30/2007); Umbilical hernia repair (N/A, 2/8/2019); Robot-assisted laparoscopic repair of ventral hernia (N/A, 6/28/2021); Excision of parotid gland (Left, 7/8/2022); and Colonoscopy (N/A, 9/20/2022).    Family History  Her family history is not on file.    Social History  She reports that she has been smoking cigarettes. She has never used smokeless tobacco. She reports current alcohol use. She reports current drug use. Drug: Marijuana.    Allergies  She is allergic to versed [midazolam].    Medications  She has a current medication list which includes the following prescription(s): alprazolam, aspirin, atorvastatin, calcium carbonate, ergocalciferol, levetiracetam, lisinopril, magnesium oxide, multivit-min/iron/folic/lutein, and [DISCONTINUED] mirtazapine.    Review of Systems   Constitutional:  Negative for chills and fever.   HENT:  Positive for hearing loss. Negative  for ear discharge, ear pain and tinnitus.    Eyes:  Negative for photophobia and pain.   Respiratory:  Negative for cough, shortness of breath and stridor.    Gastrointestinal:  Negative for abdominal pain and nausea.   Neurological:  Negative for dizziness.        Objective:     Wt 40.8 kg (89 lb 15.2 oz)   LMP  (LMP Unknown)   BMI 17.57 kg/m²    Physical Exam  Constitutional: Well appearing/communicating. Voice clear. NAD.  Eyes: EOM I Bilaterally  Head/Face: Normocephalic.  House Brackmann I Bilaterally.  Right Ear: External Auditory Canal WNL,TM w/o masses/lesions/perforations.  Auricle WNL.  Left Ear: External Auditory Canal WNL,TM w/o masses/lesions/perforations. Auricle WNL.  Nose: No gross nasal septal deviation. Inferior Turbinates 2+ bilaterally. No septal perforation. No masses/lesions. External nasal skin without masses/lesions.  Oral Cavity: Gingiva/lips WNL. Oral Tongue mobile. Hard Palate WNL.   Oropharynx: Tonsillar fossa/pharyngeal wall without lesions. Posterior oropharynx WNL.  Soft palate without masses. Midline uvula.   Neck/Lymphatic: No LAD I-VI bilaterally.  No thyromegaly.  No masses noted on exam.  Neuro/Psychiatric: AOx3.  Normal mood and affect.   Respiratory: Normal respiratory effort, no stridor/stertor, no retractions noted.      Procedure    None        Data Reviewed             Assessment:     1. Sensorineural hearing loss (SNHL) of both ears         Plan:   Discussed utility of hearing aids with patient, she is cost limited. Will try new battery in the aid she currently has. Resources provided for hearing aids.

## 2022-11-25 ENCOUNTER — OFFICE VISIT (OUTPATIENT)
Dept: GASTROENTEROLOGY | Facility: CLINIC | Age: 67
End: 2022-11-25
Payer: MEDICARE

## 2022-11-25 VITALS
HEIGHT: 60 IN | SYSTOLIC BLOOD PRESSURE: 125 MMHG | BODY MASS INDEX: 17.4 KG/M2 | HEART RATE: 100 BPM | DIASTOLIC BLOOD PRESSURE: 81 MMHG | WEIGHT: 88.63 LBS

## 2022-11-25 DIAGNOSIS — K62.5 BRBPR (BRIGHT RED BLOOD PER RECTUM): ICD-10-CM

## 2022-11-25 PROCEDURE — 3288F PR FALLS RISK ASSESSMENT DOCUMENTED: ICD-10-PCS | Mod: CPTII,S$GLB,, | Performed by: INTERNAL MEDICINE

## 2022-11-25 PROCEDURE — 99999 PR PBB SHADOW E&M-EST. PATIENT-LVL III: CPT | Mod: PBBFAC,,, | Performed by: INTERNAL MEDICINE

## 2022-11-25 PROCEDURE — 99213 PR OFFICE/OUTPT VISIT, EST, LEVL III, 20-29 MIN: ICD-10-PCS | Mod: S$GLB,,, | Performed by: INTERNAL MEDICINE

## 2022-11-25 PROCEDURE — 3008F PR BODY MASS INDEX (BMI) DOCUMENTED: ICD-10-PCS | Mod: CPTII,S$GLB,, | Performed by: INTERNAL MEDICINE

## 2022-11-25 PROCEDURE — 1101F PR PT FALLS ASSESS DOC 0-1 FALLS W/OUT INJ PAST YR: ICD-10-PCS | Mod: CPTII,S$GLB,, | Performed by: INTERNAL MEDICINE

## 2022-11-25 PROCEDURE — 99999 PR PBB SHADOW E&M-EST. PATIENT-LVL III: ICD-10-PCS | Mod: PBBFAC,,, | Performed by: INTERNAL MEDICINE

## 2022-11-25 PROCEDURE — 99213 OFFICE O/P EST LOW 20 MIN: CPT | Mod: S$GLB,,, | Performed by: INTERNAL MEDICINE

## 2022-11-25 PROCEDURE — 4010F PR ACE/ARB THEARPY RXD/TAKEN: ICD-10-PCS | Mod: CPTII,S$GLB,, | Performed by: INTERNAL MEDICINE

## 2022-11-25 PROCEDURE — 3008F BODY MASS INDEX DOCD: CPT | Mod: CPTII,S$GLB,, | Performed by: INTERNAL MEDICINE

## 2022-11-25 PROCEDURE — 4010F ACE/ARB THERAPY RXD/TAKEN: CPT | Mod: CPTII,S$GLB,, | Performed by: INTERNAL MEDICINE

## 2022-11-25 PROCEDURE — 1126F PR PAIN SEVERITY QUANTIFIED, NO PAIN PRESENT: ICD-10-PCS | Mod: CPTII,S$GLB,, | Performed by: INTERNAL MEDICINE

## 2022-11-25 PROCEDURE — 3074F PR MOST RECENT SYSTOLIC BLOOD PRESSURE < 130 MM HG: ICD-10-PCS | Mod: CPTII,S$GLB,, | Performed by: INTERNAL MEDICINE

## 2022-11-25 PROCEDURE — 1101F PT FALLS ASSESS-DOCD LE1/YR: CPT | Mod: CPTII,S$GLB,, | Performed by: INTERNAL MEDICINE

## 2022-11-25 PROCEDURE — 3044F HG A1C LEVEL LT 7.0%: CPT | Mod: CPTII,S$GLB,, | Performed by: INTERNAL MEDICINE

## 2022-11-25 PROCEDURE — 3079F DIAST BP 80-89 MM HG: CPT | Mod: CPTII,S$GLB,, | Performed by: INTERNAL MEDICINE

## 2022-11-25 PROCEDURE — 1159F MED LIST DOCD IN RCRD: CPT | Mod: CPTII,S$GLB,, | Performed by: INTERNAL MEDICINE

## 2022-11-25 PROCEDURE — 1159F PR MEDICATION LIST DOCUMENTED IN MEDICAL RECORD: ICD-10-PCS | Mod: CPTII,S$GLB,, | Performed by: INTERNAL MEDICINE

## 2022-11-25 PROCEDURE — 1126F AMNT PAIN NOTED NONE PRSNT: CPT | Mod: CPTII,S$GLB,, | Performed by: INTERNAL MEDICINE

## 2022-11-25 PROCEDURE — 3074F SYST BP LT 130 MM HG: CPT | Mod: CPTII,S$GLB,, | Performed by: INTERNAL MEDICINE

## 2022-11-25 PROCEDURE — 3079F PR MOST RECENT DIASTOLIC BLOOD PRESSURE 80-89 MM HG: ICD-10-PCS | Mod: CPTII,S$GLB,, | Performed by: INTERNAL MEDICINE

## 2022-11-25 PROCEDURE — 3044F PR MOST RECENT HEMOGLOBIN A1C LEVEL <7.0%: ICD-10-PCS | Mod: CPTII,S$GLB,, | Performed by: INTERNAL MEDICINE

## 2022-11-25 PROCEDURE — 3288F FALL RISK ASSESSMENT DOCD: CPT | Mod: CPTII,S$GLB,, | Performed by: INTERNAL MEDICINE

## 2022-11-25 RX ORDER — HYDROCORTISONE ACETATE 25 MG/1
25 SUPPOSITORY RECTAL 2 TIMES DAILY
Qty: 20 SUPPOSITORY | Refills: 0 | Status: SHIPPED | OUTPATIENT
Start: 2022-11-25 | End: 2022-12-05

## 2022-11-25 NOTE — PROGRESS NOTES
Reason for visit: BRBPR    HPI: Ms. Lui is a 67 year old lady with BRBPR. Medical history includes HTN, HLP, Stroke, Osteoporosis, Anxiety , Depression and Vitamin D deficiency. Parotid mass resected in July 2022 was benign. Underwent colonoscopy in 2022 Sept for BRBPR. Two polyps (tubular adenomas) were resected and internal hemorrhoids were found. Denies any dysphagia, odynophagia, nausea, vomiting, heartburn or acid regurgitation. No abdominal pains, changes in bowel pattern, blood/ mucus in stool or unintentional weight loss. Has straining at bowels. No melena or maroon stools. No recent changes in diet or medications. No family history of IBD, Celiac disease or GI malignancy. No regular NSAIDs, alcohol or tobacco use (smokes ). No recent antibiotic use, travels or sick contacts. No prior history of C.diff. Occasional fecal seepage.     Past medical, surgical, social and family history reviewed in epic    Medication allergies reviewed in epic    Review of systems:    Constitutional:  No fever, no chills, no weight loss, appetite is normal  Eyes:  No visual changes or red eyes  ENT:  No odynophagia or hoarseness of voice  Cardiovascular:  No angina or palpitation  Respiratory:  No shortness breath or wheezing; has chronic cough  Genitourinary:  No dysuria or frequency  Musculoskeletal:  No myalgias or arthralgias  Skin:  No pruritus or eczema  Neurologic:  No headache or seizures  Psychiatric:  No anxiety depression  Gastrointestinal:  See HPI    Physical exam:  Vitals see epic, awake, alert, oriented x3 in no acute distress    Neck:  Supple, no carotid bruit, no cervical adenopathy  Abdomen:  Flat, soft, nontender, nondistended, no masses palpable, no hepatosplenomegaly detected, bowel sounds are normal, no ascites clinically detectable  Eyes:  Conjunctivae anicteric, not injected  ENT:  Oral mucosa moist  Cardiovascular:  S1, S2 normal, no murmurs, no gallops, no abdominal bruits heard  Respiratory:   Bilateral air entry equal, no rhonchi, no crackles, normal effort  Skin:  No palmar erythema or spider angiomata  Neurologic:  No asterixis or tremors  Psychiatric:  Affect appropriate, proper judgment, proper insight, oriented to place and time  Lower extremities:  No pedal edema    Recent labs, imaging and endoscopy reports reviewed.      Impression: Constipation, internal hemorrhoids with BRBPR    Recommendations:     Anusol HC suppository bid for 10 days.  Recommend Colace 50 mg twice daily.  Refer to CRS for hemorrhoid banding in 2 weeks.

## 2022-11-25 NOTE — PATIENT INSTRUCTIONS
Anusol HC suppositories twice daily for 10 days.    Use Colace 50 mg twice daily    Will refer to Colon and Rectal surgery for hemorrhoid banding.

## 2022-11-28 ENCOUNTER — PES CALL (OUTPATIENT)
Dept: ADMINISTRATIVE | Facility: CLINIC | Age: 67
End: 2022-11-28
Payer: MEDICARE

## 2022-11-30 ENCOUNTER — OFFICE VISIT (OUTPATIENT)
Dept: SURGERY | Facility: CLINIC | Age: 67
End: 2022-11-30
Payer: MEDICARE

## 2022-11-30 VITALS
BODY MASS INDEX: 17.93 KG/M2 | HEART RATE: 88 BPM | WEIGHT: 91.31 LBS | SYSTOLIC BLOOD PRESSURE: 136 MMHG | DIASTOLIC BLOOD PRESSURE: 65 MMHG | HEIGHT: 60 IN

## 2022-11-30 DIAGNOSIS — K64.8 BLEEDING INTERNAL HEMORRHOIDS: Primary | ICD-10-CM

## 2022-11-30 DIAGNOSIS — K64.2 PROLAPSED INTERNAL HEMORRHOIDS, GRADE 3: ICD-10-CM

## 2022-11-30 PROCEDURE — 3008F PR BODY MASS INDEX (BMI) DOCUMENTED: ICD-10-PCS | Mod: CPTII,S$GLB,, | Performed by: COLON & RECTAL SURGERY

## 2022-11-30 PROCEDURE — 99999 PR PBB SHADOW E&M-EST. PATIENT-LVL III: ICD-10-PCS | Mod: PBBFAC,,, | Performed by: COLON & RECTAL SURGERY

## 2022-11-30 PROCEDURE — 3078F DIAST BP <80 MM HG: CPT | Mod: CPTII,S$GLB,, | Performed by: COLON & RECTAL SURGERY

## 2022-11-30 PROCEDURE — 3075F SYST BP GE 130 - 139MM HG: CPT | Mod: CPTII,S$GLB,, | Performed by: COLON & RECTAL SURGERY

## 2022-11-30 PROCEDURE — 1160F PR REVIEW ALL MEDS BY PRESCRIBER/CLIN PHARMACIST DOCUMENTED: ICD-10-PCS | Mod: CPTII,S$GLB,, | Performed by: COLON & RECTAL SURGERY

## 2022-11-30 PROCEDURE — 3008F BODY MASS INDEX DOCD: CPT | Mod: CPTII,S$GLB,, | Performed by: COLON & RECTAL SURGERY

## 2022-11-30 PROCEDURE — 3075F PR MOST RECENT SYSTOLIC BLOOD PRESS GE 130-139MM HG: ICD-10-PCS | Mod: CPTII,S$GLB,, | Performed by: COLON & RECTAL SURGERY

## 2022-11-30 PROCEDURE — 1101F PT FALLS ASSESS-DOCD LE1/YR: CPT | Mod: CPTII,S$GLB,, | Performed by: COLON & RECTAL SURGERY

## 2022-11-30 PROCEDURE — 1126F PR PAIN SEVERITY QUANTIFIED, NO PAIN PRESENT: ICD-10-PCS | Mod: CPTII,S$GLB,, | Performed by: COLON & RECTAL SURGERY

## 2022-11-30 PROCEDURE — 1126F AMNT PAIN NOTED NONE PRSNT: CPT | Mod: CPTII,S$GLB,, | Performed by: COLON & RECTAL SURGERY

## 2022-11-30 PROCEDURE — 46221 PR HEMORRHOIDECTOMY INTERNAL RUBBER BAND LIGATIONS: ICD-10-PCS | Mod: S$GLB,,, | Performed by: COLON & RECTAL SURGERY

## 2022-11-30 PROCEDURE — 3044F PR MOST RECENT HEMOGLOBIN A1C LEVEL <7.0%: ICD-10-PCS | Mod: CPTII,S$GLB,, | Performed by: COLON & RECTAL SURGERY

## 2022-11-30 PROCEDURE — 1101F PR PT FALLS ASSESS DOC 0-1 FALLS W/OUT INJ PAST YR: ICD-10-PCS | Mod: CPTII,S$GLB,, | Performed by: COLON & RECTAL SURGERY

## 2022-11-30 PROCEDURE — 99203 OFFICE O/P NEW LOW 30 MIN: CPT | Mod: 25,S$GLB,, | Performed by: COLON & RECTAL SURGERY

## 2022-11-30 PROCEDURE — 1159F MED LIST DOCD IN RCRD: CPT | Mod: CPTII,S$GLB,, | Performed by: COLON & RECTAL SURGERY

## 2022-11-30 PROCEDURE — 4010F PR ACE/ARB THEARPY RXD/TAKEN: ICD-10-PCS | Mod: CPTII,S$GLB,, | Performed by: COLON & RECTAL SURGERY

## 2022-11-30 PROCEDURE — 99203 PR OFFICE/OUTPT VISIT, NEW, LEVL III, 30-44 MIN: ICD-10-PCS | Mod: 25,S$GLB,, | Performed by: COLON & RECTAL SURGERY

## 2022-11-30 PROCEDURE — 3044F HG A1C LEVEL LT 7.0%: CPT | Mod: CPTII,S$GLB,, | Performed by: COLON & RECTAL SURGERY

## 2022-11-30 PROCEDURE — 1159F PR MEDICATION LIST DOCUMENTED IN MEDICAL RECORD: ICD-10-PCS | Mod: CPTII,S$GLB,, | Performed by: COLON & RECTAL SURGERY

## 2022-11-30 PROCEDURE — 46221 LIGATION OF HEMORRHOID(S): CPT | Mod: S$GLB,,, | Performed by: COLON & RECTAL SURGERY

## 2022-11-30 PROCEDURE — 99999 PR PBB SHADOW E&M-EST. PATIENT-LVL III: CPT | Mod: PBBFAC,,, | Performed by: COLON & RECTAL SURGERY

## 2022-11-30 PROCEDURE — 1160F RVW MEDS BY RX/DR IN RCRD: CPT | Mod: CPTII,S$GLB,, | Performed by: COLON & RECTAL SURGERY

## 2022-11-30 PROCEDURE — 3288F FALL RISK ASSESSMENT DOCD: CPT | Mod: CPTII,S$GLB,, | Performed by: COLON & RECTAL SURGERY

## 2022-11-30 PROCEDURE — 3288F PR FALLS RISK ASSESSMENT DOCUMENTED: ICD-10-PCS | Mod: CPTII,S$GLB,, | Performed by: COLON & RECTAL SURGERY

## 2022-11-30 PROCEDURE — 3078F PR MOST RECENT DIASTOLIC BLOOD PRESSURE < 80 MM HG: ICD-10-PCS | Mod: CPTII,S$GLB,, | Performed by: COLON & RECTAL SURGERY

## 2022-11-30 PROCEDURE — 4010F ACE/ARB THERAPY RXD/TAKEN: CPT | Mod: CPTII,S$GLB,, | Performed by: COLON & RECTAL SURGERY

## 2022-11-30 NOTE — PROGRESS NOTES
PI:  Gena Lui is a 67 y.o. female with history of rectal bleeding    11-  Interval hx:    Had BRBPR .  Large amount in Toilet bowl.  Went to ED  No bleeding since.  No prolapse.  BMs daily.  Often loose.  Currently on stool softeners BID       9-  colonoscopy  Findings:        The perianal and digital rectal examinations were normal.        A 5 mm polyp was found in the ascending colon. The polyp was        sessile. The polyp was removed with a cold snare. Resection and        retrieval were complete.        A 12 mm polyp was found in the sigmoid colon. The polyp was sessile.        The polyp was removed with a hot snare. Resection and retrieval were        complete. To close a defect after polypectomy, one hemostatic clip        was successfully placed. There was no bleeding at the end of the        procedure.        The exam was otherwise without abnormality on direct and        retroflexion views.        Internal hemorrhoids were found during retroflexion. The hemorrhoids        were medium-sized.     Collected: 09/20/22 1014   Result status: Final   Resulting lab: [facility]   Value: 1. DESCENDING COLON, POLYPECTOMY:   Tubular adenoma   2. SIGMOID COLON, POLYPECTOMY:   Multiple fragments of tubular adenoma (s)    Comment: Interp By Erin Yusuf M.D., Signed on 09/22/2022 at 15:31       Past Medical History:   Diagnosis Date    Anxiety and depression 3/21/2018    Benign essential hypertension 3/21/2018    Chronic bilateral low back pain without sciatica 3/21/2018    History of stroke 3/21/2018    Memory loss     Mixed hyperlipidemia 3/21/2018    Seizures     Tobacco abuse 3/21/2018        Past Surgical History:   Procedure Laterality Date    ANTERIOR VAGINAL REPAIR      CATARACT EXTRACTION Right 10/16/2007    Dr. Ramsey //yag 5/1/18 Dr. Bentley    CATARACT EXTRACTION W/  INTRAOCULAR LENS IMPLANT Left 10/30/2007    Dr. Ramsey// mark 5/1/18 Dr. Bentley    CHOLECYSTECTOMY       COLONOSCOPY N/A 4/19/2018    Procedure: COLONOSCOPY;  Surgeon: Yi Goncalves MD;  Location: [facility] ENDO;  Service: Endoscopy;  Laterality: N/A;    COLONOSCOPY N/A 9/20/2022    Procedure: COLONOSCOPY Golytely Vaccinated;  Surgeon: Brook Sifuentes MD;  Location: [facility] ENDO;  Service: Endoscopy;  Laterality: N/A;    EXCISION OF PAROTID GLAND Left 7/8/2022    Procedure: EXCISION, PAROTID GLAND;  Surgeon: Kit Conley MD;  Location: [address];  Service: ENT;  Laterality: Left;    EYE SURGERY      ROBOT-ASSISTED LAPAROSCOPIC REPAIR OF VENTRAL HERNIA N/A 6/28/2021    Procedure: ROBOTIC REPAIR, HERNIA, VENTRAL;  Surgeon: Alli Dumont Jr., MD;  Location: [facility] OR;  Service: General;  Laterality: N/A;    TONSILLECTOMY      UMBILICAL HERNIA REPAIR N/A 2/8/2019    Procedure: REPAIR, HERNIA, UMBILICAL, AGE 5 YEARS OR OLDER, open with/without mesh;  Surgeon: Magno Mishra MD;  Location: [facility] OR [address] [place] [address];  Service: General;  Laterality: N/A;       Review of patient's allergies indicates:   Allergen Reactions    Versed [midazolam]      IV Versed altered mental status       Family History   Problem Relation Age of Onset    Blindness Neg Hx     Glaucoma Neg Hx     Macular degeneration Neg Hx     Retinal detachment Neg Hx        Social History     Socioeconomic History    Marital status:    Tobacco Use    Smoking status: Every Day     Types: Cigarettes     Last attempt to quit: 5/5/2019     Years since quitting: 3.5    Smokeless tobacco: Never    Tobacco comments:     started smoking again in 12/2020(when nervous/bored)   Substance and Sexual Activity    Alcohol use: Yes     Comment: 2-3 week    Drug use: Yes     Types: Marijuana     Comment: intermittent to take the edge off of her anxiety    Sexual activity: Never       ROS:  GENERAL: No fever, chills, fatigability or weight loss.  Integument: No rashes, redness, icterus  CHEST: Denies IBANEZ, cyanosis, wheezing, cough and sputum  production.  CARDIOVASCULAR: Denies chest pain, PND, orthopnea or reduced exercise tolerance.  GI: Denies abd pain, dysphagia, nausea, vomiting, no hematemesis   : Denies burning on urination, no hematuria, no bacteriuria  MSK: No deformities, swelling, joint pain swelling  Neurologic: No HAs, seizures, weakness, paresthesias, gait problems    PE:  General appearance well  /65 (BP Location: Right arm, Patient Position: Sitting, BP Method: Small (Automatic))   Pulse 88   Ht 5' (1.524 m)   Wt 41.4 kg (91 lb 4.8 oz)   LMP  (LMP Unknown)   BMI 17.83 kg/m²     Sclera/ Skin anicteric  AT NC EOMI  Neck supple trachea midline   Chest symmetric, nl excursion, no retractions, breathing comfortably  EXT - no CCE  Neuro:  Mood/ affect nl, alert and oriented x 3, moves all ext's, gait nl    Rectal  Inspection       DONALDO nl tone, no mass      Assessment:  Rectal bleeding, most likely hemorrhoidal  Loose BMs    Plan:  Anoscopy with banding  Stop stool softeners  High fiber diet - 25 grams per day.  Emphasis on whole grain breads such as double fiber wheat bread and high fiber cereals and bars.    Drink 8 glasses of water per day 64 - 96 oz per day  Fiber supplements such as KONSYL, METAMUCIL can be taken 1-2 x per day  Regular exercise - 30 min brisk walking 5 days per week  RTC in 6 weeks      Hemorrhoid Rubber Band Ligation Procedure Note    Verbal consent obtained for anoscopy and rubber band ligation.    The details of the procedure and the expected outcome discussed..  The risk of persistent symptoms, need for repeat banding, bleeding, infection discussed with pt.      Indications: The patient had a history of bleeding and prolapsing  internal hemorrhoids.    Pre-operative Diagnosis: Internal hemorrhoids with bleeding and prolapse     Post-operative Diagnosis: Internal hemorrhoids with bleeding and prolapse    Surgeon: NOAH COWAN     Assistants: MA    Findings at anoscopy:    Left lateral internal hemorrhoid  grade 1  Right anterior internal hemorrhoid grade 2  Right posterior internal hemorrhoid grade 2    Procedure Details   Acritical timeout was performed.  Digital rectal exam was performed.  I then placed a lubricated anoscope into the anal canal. Suction band ligation was then performed of the internal hemorrhoids in the  right anterior, right posterior positions.             Complications:  None; patient tolerated the procedure well.           Disposition: Discharge from office to home           Condition: stable    Instruction sheet given to patient  High fiber diet  Drink eight glasses of water per day  Miralax as necessary  OV in 6 weeks

## 2022-12-12 PROBLEM — K62.5 BRBPR (BRIGHT RED BLOOD PER RECTUM): Status: RESOLVED | Noted: 2022-09-03 | Resolved: 2022-12-12

## 2022-12-15 ENCOUNTER — PES CALL (OUTPATIENT)
Dept: ADMINISTRATIVE | Facility: OTHER | Age: 67
End: 2022-12-15
Payer: MEDICARE

## 2023-01-11 ENCOUNTER — OFFICE VISIT (OUTPATIENT)
Dept: SURGERY | Facility: CLINIC | Age: 68
End: 2023-01-11
Payer: MEDICARE

## 2023-01-11 VITALS
HEART RATE: 85 BPM | BODY MASS INDEX: 17.47 KG/M2 | SYSTOLIC BLOOD PRESSURE: 136 MMHG | WEIGHT: 89 LBS | DIASTOLIC BLOOD PRESSURE: 75 MMHG | HEIGHT: 60 IN

## 2023-01-11 DIAGNOSIS — K64.8 BLEEDING INTERNAL HEMORRHOIDS: Primary | ICD-10-CM

## 2023-01-11 PROCEDURE — 1126F PR PAIN SEVERITY QUANTIFIED, NO PAIN PRESENT: ICD-10-PCS | Mod: CPTII,S$GLB,, | Performed by: COLON & RECTAL SURGERY

## 2023-01-11 PROCEDURE — 1101F PT FALLS ASSESS-DOCD LE1/YR: CPT | Mod: CPTII,S$GLB,, | Performed by: COLON & RECTAL SURGERY

## 2023-01-11 PROCEDURE — 3288F FALL RISK ASSESSMENT DOCD: CPT | Mod: CPTII,S$GLB,, | Performed by: COLON & RECTAL SURGERY

## 2023-01-11 PROCEDURE — 1101F PR PT FALLS ASSESS DOC 0-1 FALLS W/OUT INJ PAST YR: ICD-10-PCS | Mod: CPTII,S$GLB,, | Performed by: COLON & RECTAL SURGERY

## 2023-01-11 PROCEDURE — 99213 OFFICE O/P EST LOW 20 MIN: CPT | Mod: S$GLB,,, | Performed by: COLON & RECTAL SURGERY

## 2023-01-11 PROCEDURE — 99213 PR OFFICE/OUTPT VISIT, EST, LEVL III, 20-29 MIN: ICD-10-PCS | Mod: S$GLB,,, | Performed by: COLON & RECTAL SURGERY

## 2023-01-11 PROCEDURE — 1159F PR MEDICATION LIST DOCUMENTED IN MEDICAL RECORD: ICD-10-PCS | Mod: CPTII,S$GLB,, | Performed by: COLON & RECTAL SURGERY

## 2023-01-11 PROCEDURE — 3008F PR BODY MASS INDEX (BMI) DOCUMENTED: ICD-10-PCS | Mod: CPTII,S$GLB,, | Performed by: COLON & RECTAL SURGERY

## 2023-01-11 PROCEDURE — 3075F SYST BP GE 130 - 139MM HG: CPT | Mod: CPTII,S$GLB,, | Performed by: COLON & RECTAL SURGERY

## 2023-01-11 PROCEDURE — 3075F PR MOST RECENT SYSTOLIC BLOOD PRESS GE 130-139MM HG: ICD-10-PCS | Mod: CPTII,S$GLB,, | Performed by: COLON & RECTAL SURGERY

## 2023-01-11 PROCEDURE — 99999 PR PBB SHADOW E&M-EST. PATIENT-LVL III: CPT | Mod: PBBFAC,,, | Performed by: COLON & RECTAL SURGERY

## 2023-01-11 PROCEDURE — 3008F BODY MASS INDEX DOCD: CPT | Mod: CPTII,S$GLB,, | Performed by: COLON & RECTAL SURGERY

## 2023-01-11 PROCEDURE — 3078F DIAST BP <80 MM HG: CPT | Mod: CPTII,S$GLB,, | Performed by: COLON & RECTAL SURGERY

## 2023-01-11 PROCEDURE — 3288F PR FALLS RISK ASSESSMENT DOCUMENTED: ICD-10-PCS | Mod: CPTII,S$GLB,, | Performed by: COLON & RECTAL SURGERY

## 2023-01-11 PROCEDURE — 1126F AMNT PAIN NOTED NONE PRSNT: CPT | Mod: CPTII,S$GLB,, | Performed by: COLON & RECTAL SURGERY

## 2023-01-11 PROCEDURE — 3078F PR MOST RECENT DIASTOLIC BLOOD PRESSURE < 80 MM HG: ICD-10-PCS | Mod: CPTII,S$GLB,, | Performed by: COLON & RECTAL SURGERY

## 2023-01-11 PROCEDURE — 1159F MED LIST DOCD IN RCRD: CPT | Mod: CPTII,S$GLB,, | Performed by: COLON & RECTAL SURGERY

## 2023-01-11 PROCEDURE — 99999 PR PBB SHADOW E&M-EST. PATIENT-LVL III: ICD-10-PCS | Mod: PBBFAC,,, | Performed by: COLON & RECTAL SURGERY

## 2023-01-11 NOTE — PROGRESS NOTES
PI:  Gena Lui is a 67 y.o. female with history of bleeding internal hemorrhoids    11-  RBL  Procedure Details   Acritical timeout was performed.  Digital rectal exam was performed.  I then placed a lubricated anoscope into the anal canal. Suction band ligation was then performed of the internal hemorrhoids in the  right anterior, right posterior positions.      1-  Interval hx:    BMs improved with Metamucil.    No further anal bleeding.  Denies anal pain      Past Medical History:   Diagnosis Date    Anxiety and depression 3/21/2018    Benign essential hypertension 3/21/2018    Chronic bilateral low back pain without sciatica 3/21/2018    History of stroke 3/21/2018    Memory loss     Mixed hyperlipidemia 3/21/2018    Seizures     Tobacco abuse 3/21/2018        Past Surgical History:   Procedure Laterality Date    ANTERIOR VAGINAL REPAIR      CATARACT EXTRACTION Right 10/16/2007    Dr. Ramsey //yag 5/1/18 Dr. Bentley    CATARACT EXTRACTION W/  INTRAOCULAR LENS IMPLANT Left 10/30/2007    Dr. Ramsey// yag 5/1/18 Dr. Bentley    CHOLECYSTECTOMY      COLONOSCOPY N/A 4/19/2018    Procedure: COLONOSCOPY;  Surgeon: Yi Goncalves MD;  Location: [facility] ENDO;  Service: Endoscopy;  Laterality: N/A;    COLONOSCOPY N/A 9/20/2022    Procedure: COLONOSCOPY Golytely Vaccinated;  Surgeon: Brook Sifuentes MD;  Location: [facility];  Service: Endoscopy;  Laterality: N/A;    EXCISION OF PAROTID GLAND Left 7/8/2022    Procedure: EXCISION, PAROTID GLAND;  Surgeon: Kit Conley MD;  Location: [address];  Service: ENT;  Laterality: Left;    EYE SURGERY      ROBOT-ASSISTED LAPAROSCOPIC REPAIR OF VENTRAL HERNIA N/A 6/28/2021    Procedure: ROBOTIC REPAIR, HERNIA, VENTRAL;  Surgeon: Alli Dumont Jr., MD;  Location: [facility];  Service: General;  Laterality: N/A;    TONSILLECTOMY      UMBILICAL HERNIA REPAIR N/A 2/8/2019    Procedure: REPAIR, HERNIA, UMBILICAL, AGE 5 YEARS OR OLDER, open with/without  mesh;  Surgeon: Magno Mishra MD;  Location: [facility] OR [address] [place] [address];  Service: General;  Laterality: N/A;       Review of patient's allergies indicates:   Allergen Reactions    Versed [midazolam]      IV Versed altered mental status       Family History   Problem Relation Age of Onset    Blindness Neg Hx     Glaucoma Neg Hx     Macular degeneration Neg Hx     Retinal detachment Neg Hx        Social History     Socioeconomic History    Marital status:    Tobacco Use    Smoking status: Every Day     Types: Cigarettes     Last attempt to quit: 5/5/2019     Years since quitting: 3.6    Smokeless tobacco: Never    Tobacco comments:     started smoking again in 12/2020(when nervous/bored)   Substance and Sexual Activity    Alcohol use: Yes     Comment: 2-3 week    Drug use: Yes     Types: Marijuana     Comment: intermittent to take the edge off of her anxiety    Sexual activity: Never       ROS:  GENERAL: No fever, chills, fatigability or weight loss.  Integument: No rashes, redness, icterus  CHEST: Denies IBANEZ, cyanosis, wheezing, cough and sputum production.  CARDIOVASCULAR: Denies chest pain, PND, orthopnea or reduced exercise tolerance.  GI: Denies abd pain, dysphagia, nausea, vomiting, no hematemesis   : Denies burning on urination, no hematuria, no bacteriuria  MSK: No deformities, swelling, joint pain swelling  Neurologic: No HAs, seizures, weakness, paresthesias, gait problems    PE:  General appearance well  /75 (BP Location: Right arm, Patient Position: Sitting, BP Method: Small (Automatic))   Pulse 85   Ht 5' (1.524 m)   Wt 40.4 kg (89 lb)   LMP  (LMP Unknown)   BMI 17.38 kg/m²   Sclera/ Skin anicteric  AT NC EOMI  Neck supple trachea midline   Chest symmetric, nl excursion, no retractions, breathing comfortably  EXT - no CCE  Neuro:  Mood/ affect nl, alert and oriented x 3, moves all ext's, gait nl      Assessment:  Anal bleeding improved  BMs improved with fiber supplement    Plan:  RTC  prn

## 2023-02-03 ENCOUNTER — LAB VISIT (OUTPATIENT)
Dept: LAB | Facility: HOSPITAL | Age: 68
End: 2023-02-03
Attending: FAMILY MEDICINE
Payer: MEDICARE

## 2023-02-03 DIAGNOSIS — M81.0 POSTMENOPAUSAL OSTEOPOROSIS: ICD-10-CM

## 2023-02-03 DIAGNOSIS — E55.9 VITAMIN D DEFICIENCY: ICD-10-CM

## 2023-02-03 DIAGNOSIS — E78.2 MIXED HYPERLIPIDEMIA: ICD-10-CM

## 2023-02-03 DIAGNOSIS — I10 BENIGN ESSENTIAL HYPERTENSION: ICD-10-CM

## 2023-02-03 DIAGNOSIS — Z79.899 MEDICATION MANAGEMENT: ICD-10-CM

## 2023-02-03 LAB
25(OH)D3+25(OH)D2 SERPL-MCNC: 50 NG/ML (ref 30–96)
ALBUMIN SERPL BCP-MCNC: 3.7 G/DL (ref 3.5–5.2)
ALP SERPL-CCNC: 124 U/L (ref 55–135)
ALT SERPL W/O P-5'-P-CCNC: 12 U/L (ref 10–44)
ANION GAP SERPL CALC-SCNC: 11 MMOL/L (ref 8–16)
AST SERPL-CCNC: 19 U/L (ref 10–40)
BASOPHILS # BLD AUTO: 0.07 K/UL (ref 0–0.2)
BASOPHILS NFR BLD: 1 % (ref 0–1.9)
BILIRUB SERPL-MCNC: 0.5 MG/DL (ref 0.1–1)
BUN SERPL-MCNC: 13 MG/DL (ref 8–23)
CALCIUM SERPL-MCNC: 9.7 MG/DL (ref 8.7–10.5)
CHLORIDE SERPL-SCNC: 106 MMOL/L (ref 95–110)
CHOLEST SERPL-MCNC: 184 MG/DL (ref 120–199)
CHOLEST/HDLC SERPL: 3.8 {RATIO} (ref 2–5)
CO2 SERPL-SCNC: 24 MMOL/L (ref 23–29)
CREAT SERPL-MCNC: 0.8 MG/DL (ref 0.5–1.4)
DIFFERENTIAL METHOD: ABNORMAL
EOSINOPHIL # BLD AUTO: 0.2 K/UL (ref 0–0.5)
EOSINOPHIL NFR BLD: 2.3 % (ref 0–8)
ERYTHROCYTE [DISTWIDTH] IN BLOOD BY AUTOMATED COUNT: 13.4 % (ref 11.5–14.5)
EST. GFR  (NO RACE VARIABLE): >60 ML/MIN/1.73 M^2
ESTIMATED AVG GLUCOSE: 94 MG/DL (ref 68–131)
GLUCOSE SERPL-MCNC: 95 MG/DL (ref 70–110)
HBA1C MFR BLD: 4.9 % (ref 4–5.6)
HCT VFR BLD AUTO: 42.3 % (ref 37–48.5)
HDLC SERPL-MCNC: 49 MG/DL (ref 40–75)
HDLC SERPL: 26.6 % (ref 20–50)
HGB BLD-MCNC: 13.9 G/DL (ref 12–16)
IMM GRANULOCYTES # BLD AUTO: 0.04 K/UL (ref 0–0.04)
IMM GRANULOCYTES NFR BLD AUTO: 0.6 % (ref 0–0.5)
LDLC SERPL CALC-MCNC: 84 MG/DL (ref 63–159)
LYMPHOCYTES # BLD AUTO: 1.8 K/UL (ref 1–4.8)
LYMPHOCYTES NFR BLD: 25.9 % (ref 18–48)
MAGNESIUM SERPL-MCNC: 1.7 MG/DL (ref 1.6–2.6)
MCH RBC QN AUTO: 31.3 PG (ref 27–31)
MCHC RBC AUTO-ENTMCNC: 32.9 G/DL (ref 32–36)
MCV RBC AUTO: 95 FL (ref 82–98)
MONOCYTES # BLD AUTO: 0.4 K/UL (ref 0.3–1)
MONOCYTES NFR BLD: 6 % (ref 4–15)
NEUTROPHILS # BLD AUTO: 4.4 K/UL (ref 1.8–7.7)
NEUTROPHILS NFR BLD: 64.2 % (ref 38–73)
NONHDLC SERPL-MCNC: 135 MG/DL
NRBC BLD-RTO: 0 /100 WBC
PLATELET # BLD AUTO: 220 K/UL (ref 150–450)
PMV BLD AUTO: 9.8 FL (ref 9.2–12.9)
POTASSIUM SERPL-SCNC: 4 MMOL/L (ref 3.5–5.1)
PROT SERPL-MCNC: 7 G/DL (ref 6–8.4)
RBC # BLD AUTO: 4.44 M/UL (ref 4–5.4)
SODIUM SERPL-SCNC: 141 MMOL/L (ref 136–145)
T4 FREE SERPL-MCNC: 0.85 NG/DL (ref 0.71–1.51)
TRIGL SERPL-MCNC: 255 MG/DL (ref 30–150)
TSH SERPL DL<=0.005 MIU/L-ACNC: 5.23 UIU/ML (ref 0.4–4)
VIT B12 SERPL-MCNC: 480 PG/ML (ref 210–950)
WBC # BLD AUTO: 6.83 K/UL (ref 3.9–12.7)

## 2023-02-03 PROCEDURE — 85025 COMPLETE CBC W/AUTO DIFF WBC: CPT | Performed by: FAMILY MEDICINE

## 2023-02-03 PROCEDURE — 84439 ASSAY OF FREE THYROXINE: CPT | Performed by: FAMILY MEDICINE

## 2023-02-03 PROCEDURE — 36415 COLL VENOUS BLD VENIPUNCTURE: CPT | Performed by: FAMILY MEDICINE

## 2023-02-03 PROCEDURE — 83036 HEMOGLOBIN GLYCOSYLATED A1C: CPT | Performed by: FAMILY MEDICINE

## 2023-02-03 PROCEDURE — 80053 COMPREHEN METABOLIC PANEL: CPT | Performed by: FAMILY MEDICINE

## 2023-02-03 PROCEDURE — 82306 VITAMIN D 25 HYDROXY: CPT | Performed by: FAMILY MEDICINE

## 2023-02-03 PROCEDURE — 82607 VITAMIN B-12: CPT | Performed by: FAMILY MEDICINE

## 2023-02-03 PROCEDURE — 80061 LIPID PANEL: CPT | Performed by: FAMILY MEDICINE

## 2023-02-03 PROCEDURE — 84443 ASSAY THYROID STIM HORMONE: CPT | Performed by: FAMILY MEDICINE

## 2023-02-03 PROCEDURE — 83735 ASSAY OF MAGNESIUM: CPT | Performed by: FAMILY MEDICINE

## 2023-02-14 ENCOUNTER — TELEPHONE (OUTPATIENT)
Dept: FAMILY MEDICINE | Facility: CLINIC | Age: 68
End: 2023-02-14
Payer: MEDICARE

## 2023-02-14 NOTE — TELEPHONE ENCOUNTER
----- Message from Jet Oliveira sent at 2/10/2023  9:26 AM CST -----  Contact: PT  Type:  Sooner Apoointment Request    Caller is requesting a sooner appointment.  Caller declined first available appointment listed below.  Caller will not accept being placed on the waitlist and is requesting a message be sent to doctor.  Name of Caller:PT   When is the first available appointment? June   Symptoms:ANNUAL  Would the patient rather a call back or a response via [facility]? Try Calling PT, but if no answer pt request a message to be sent on [employer]. States her phone is acting up but she does per carli to speak with someone as soon as possible.  Best Call Back Number: [phone]  Additional Information:

## 2023-04-13 ENCOUNTER — PES CALL (OUTPATIENT)
Dept: ADMINISTRATIVE | Facility: CLINIC | Age: 68
End: 2023-04-13
Payer: MEDICARE

## 2023-05-08 ENCOUNTER — OFFICE VISIT (OUTPATIENT)
Dept: OTOLARYNGOLOGY | Facility: CLINIC | Age: 68
End: 2023-05-08
Payer: MEDICARE

## 2023-05-08 VITALS
HEART RATE: 86 BPM | DIASTOLIC BLOOD PRESSURE: 81 MMHG | HEIGHT: 60 IN | SYSTOLIC BLOOD PRESSURE: 130 MMHG | WEIGHT: 89.06 LBS | BODY MASS INDEX: 17.49 KG/M2

## 2023-05-08 DIAGNOSIS — K11.8 PAROTID MASS: ICD-10-CM

## 2023-05-08 PROCEDURE — 1126F PR PAIN SEVERITY QUANTIFIED, NO PAIN PRESENT: ICD-10-PCS | Mod: CPTII,S$GLB,, | Performed by: OTOLARYNGOLOGY

## 2023-05-08 PROCEDURE — 3044F HG A1C LEVEL LT 7.0%: CPT | Mod: CPTII,S$GLB,, | Performed by: OTOLARYNGOLOGY

## 2023-05-08 PROCEDURE — 3008F BODY MASS INDEX DOCD: CPT | Mod: CPTII,S$GLB,, | Performed by: OTOLARYNGOLOGY

## 2023-05-08 PROCEDURE — 99213 OFFICE O/P EST LOW 20 MIN: CPT | Mod: S$GLB,,, | Performed by: OTOLARYNGOLOGY

## 2023-05-08 PROCEDURE — 99999 PR PBB SHADOW E&M-EST. PATIENT-LVL III: ICD-10-PCS | Mod: PBBFAC,,, | Performed by: OTOLARYNGOLOGY

## 2023-05-08 PROCEDURE — 3079F PR MOST RECENT DIASTOLIC BLOOD PRESSURE 80-89 MM HG: ICD-10-PCS | Mod: CPTII,S$GLB,, | Performed by: OTOLARYNGOLOGY

## 2023-05-08 PROCEDURE — 1159F PR MEDICATION LIST DOCUMENTED IN MEDICAL RECORD: ICD-10-PCS | Mod: CPTII,S$GLB,, | Performed by: OTOLARYNGOLOGY

## 2023-05-08 PROCEDURE — 3288F PR FALLS RISK ASSESSMENT DOCUMENTED: ICD-10-PCS | Mod: CPTII,S$GLB,, | Performed by: OTOLARYNGOLOGY

## 2023-05-08 PROCEDURE — 1160F PR REVIEW ALL MEDS BY PRESCRIBER/CLIN PHARMACIST DOCUMENTED: ICD-10-PCS | Mod: CPTII,S$GLB,, | Performed by: OTOLARYNGOLOGY

## 2023-05-08 PROCEDURE — 3075F PR MOST RECENT SYSTOLIC BLOOD PRESS GE 130-139MM HG: ICD-10-PCS | Mod: CPTII,S$GLB,, | Performed by: OTOLARYNGOLOGY

## 2023-05-08 PROCEDURE — 3044F PR MOST RECENT HEMOGLOBIN A1C LEVEL <7.0%: ICD-10-PCS | Mod: CPTII,S$GLB,, | Performed by: OTOLARYNGOLOGY

## 2023-05-08 PROCEDURE — 1126F AMNT PAIN NOTED NONE PRSNT: CPT | Mod: CPTII,S$GLB,, | Performed by: OTOLARYNGOLOGY

## 2023-05-08 PROCEDURE — 1160F RVW MEDS BY RX/DR IN RCRD: CPT | Mod: CPTII,S$GLB,, | Performed by: OTOLARYNGOLOGY

## 2023-05-08 PROCEDURE — 99213 PR OFFICE/OUTPT VISIT, EST, LEVL III, 20-29 MIN: ICD-10-PCS | Mod: S$GLB,,, | Performed by: OTOLARYNGOLOGY

## 2023-05-08 PROCEDURE — 1159F MED LIST DOCD IN RCRD: CPT | Mod: CPTII,S$GLB,, | Performed by: OTOLARYNGOLOGY

## 2023-05-08 PROCEDURE — 1101F PR PT FALLS ASSESS DOC 0-1 FALLS W/OUT INJ PAST YR: ICD-10-PCS | Mod: CPTII,S$GLB,, | Performed by: OTOLARYNGOLOGY

## 2023-05-08 PROCEDURE — 3008F PR BODY MASS INDEX (BMI) DOCUMENTED: ICD-10-PCS | Mod: CPTII,S$GLB,, | Performed by: OTOLARYNGOLOGY

## 2023-05-08 PROCEDURE — 99999 PR PBB SHADOW E&M-EST. PATIENT-LVL III: CPT | Mod: PBBFAC,,, | Performed by: OTOLARYNGOLOGY

## 2023-05-08 PROCEDURE — 3288F FALL RISK ASSESSMENT DOCD: CPT | Mod: CPTII,S$GLB,, | Performed by: OTOLARYNGOLOGY

## 2023-05-08 PROCEDURE — 3079F DIAST BP 80-89 MM HG: CPT | Mod: CPTII,S$GLB,, | Performed by: OTOLARYNGOLOGY

## 2023-05-08 PROCEDURE — 3075F SYST BP GE 130 - 139MM HG: CPT | Mod: CPTII,S$GLB,, | Performed by: OTOLARYNGOLOGY

## 2023-05-08 PROCEDURE — 1101F PT FALLS ASSESS-DOCD LE1/YR: CPT | Mod: CPTII,S$GLB,, | Performed by: OTOLARYNGOLOGY

## 2023-05-08 NOTE — PROGRESS NOTES
CC: Parotid masses      HPI   67 y.o. female presents with a history of parotidectomy for left parotid mass.  Pathology was benign.  She recently noted swelling of the left parotid possibly consistent with acute parotitis.  She underwent ultrasound which revealed several subcentimeter parotid masses bilaterally.    Review of Systems   Constitutional: Negative for fatigue and unexpected weight change.   HENT: Per HPI.  Eyes: Negative for visual disturbance.   Respiratory: Negative for shortness of breath, hemoptysis   Cardiovascular: Negative for chest pain and palpitations.   Musculoskeletal: Negative for decreased ROM, back pain.   Skin: Negative for rash, sunburn, itching.   Neurological: Negative for dizziness and seizures.   Hematological: Negative for adenopathy. Does not bruise/bleed easily.   Endocrine: Negative for rapid weight loss/weight gain, heat/cold intolerance.     Past Medical History   Patient Active Problem List   Diagnosis    Anxiety and depression    Benign essential hypertension    Mixed hyperlipidemia    History of stroke    Chronic bilateral low back pain without sciatica    Long-term use of aspirin therapy    Former smoker    History of seizures    Postmenopausal osteoporosis    Vitamin D deficiency    History of alcohol abuse    H/O umbilical hernia repair    Abnormal TSH    History of squamous cell carcinoma of skin    Trichomonal vaginitis    Wears dentures    Poor dentition    Parotid mass    Tobacco abuse           Past Surgical History   Past Surgical History:   Procedure Laterality Date    ANTERIOR VAGINAL REPAIR      CATARACT EXTRACTION Right 10/16/2007    Dr. Rmasey //yag 5/1/18 Dr. Bentley    CATARACT EXTRACTION W/  INTRAOCULAR LENS IMPLANT Left 10/30/2007    Dr. Ramsey// yag 5/1/18 Dr. Bentley    CHOLECYSTECTOMY      COLONOSCOPY N/A 4/19/2018    Procedure: COLONOSCOPY;  Surgeon: Yi Goncalves MD;  Location: [facility];  Service: Endoscopy;  Laterality: N/A;    COLONOSCOPY N/A  2022    Procedure: COLONOSCOPY Golytely Vaccinated;  Surgeon: Brook Sifuentes MD;  Location: [facility] ENDO;  Service: Endoscopy;  Laterality: N/A;    EXCISION OF PAROTID GLAND Left 2022    Procedure: EXCISION, PAROTID GLAND;  Surgeon: Kit Conley MD;  Location: [address];  Service: ENT;  Laterality: Left;    EYE SURGERY      ROBOT-ASSISTED LAPAROSCOPIC REPAIR OF VENTRAL HERNIA N/A 2021    Procedure: ROBOTIC REPAIR, HERNIA, VENTRAL;  Surgeon: Alli Dumont Jr., MD;  Location: [facility] OR;  Service: General;  Laterality: N/A;    TONSILLECTOMY      UMBILICAL HERNIA REPAIR N/A 2019    Procedure: REPAIR, HERNIA, UMBILICAL, AGE 5 YEARS OR OLDER, open with/without mesh;  Surgeon: Magno Mishra MD;  Location: [address];  Service: General;  Laterality: N/A;         Family History   Family History   Problem Relation Age of Onset    Blindness Neg Hx     Glaucoma Neg Hx     Macular degeneration Neg Hx     Retinal detachment Neg Hx            Social History   .  Social History     Socioeconomic History    Marital status:    Tobacco Use    Smoking status: Every Day     Types: Cigarettes     Last attempt to quit: 2019     Years since quittin.0    Smokeless tobacco: Never    Tobacco comments:     started smoking again in 2020(when nervous/bored)   Substance and Sexual Activity    Alcohol use: Yes     Comment: 2-3 week    Drug use: Yes     Types: Marijuana     Comment: intermittent to take the edge off of her anxiety    Sexual activity: Never         Allergies   Review of patient's allergies indicates:   Allergen Reactions    Versed [midazolam]      IV Versed altered mental status           Physical Exam     Vitals:    23 1419   BP: 130/81   Pulse: 86         Body mass index is 17.39 kg/m².      General: AOx3, NAD   Respiratory:  Symmetric chest rise, normal effort  Oral Cavity:  Oral Tongue mobile, no lesions noted. Hard Palate WNL. No buccal or FOM  lesions.  Oropharynx:  No masses/lesions of the posterior pharyngeal wall. Tonsillar fossa without lesions. Soft palate without masses. Midline uvula.   Neck: No scars.  No cervical lymphadenopathy, thyromegaly or thyroid nodules.  Normal range of motion.  Well-healed left parotidectomy scar.  Face: House Brackmann I bilaterally.     Outside records reviewed.    Assessment/Plan  Problem List Items Addressed This Visit          ENT    Parotid mass     Status post left parotidectomy last year.  Pathology revealed a benign lymphoepithelial cyst.  HIV negative.  No history of autoimmune disease.  She now has several subcentimeter parotid masses bilaterally.  I suspect these are all benign and I am comfortable observing.  I also offered her needle biopsy of the right-sided parotid mass.  She is comfortable observing.  She is primarily concerned about the possibility of recurrent parotitis and will return to me should she experience symptoms of such a problem.

## 2023-05-08 NOTE — ASSESSMENT & PLAN NOTE
Status post left parotidectomy last year.  Pathology revealed a benign lymphoepithelial cyst.  HIV negative.  No history of autoimmune disease.  She now has several subcentimeter parotid masses bilaterally.  I suspect these are all benign and I am comfortable observing.  I also offered her needle biopsy of the right-sided parotid mass.  She is comfortable observing.  She is primarily concerned about the possibility of recurrent parotitis and will return to me should she experience symptoms of such a problem.

## 2023-08-07 ENCOUNTER — HOSPITAL ENCOUNTER (OUTPATIENT)
Dept: RADIOLOGY | Facility: HOSPITAL | Age: 68
Discharge: HOME | End: 2023-08-07
Attending: NURSE PRACTITIONER
Payer: MEDICARE

## 2023-08-07 DIAGNOSIS — Z12.31 BREAST CANCER SCREENING BY MAMMOGRAM: ICD-10-CM

## 2023-08-07 PROCEDURE — 77063 MAMMO DIGITAL SCREENING BILAT WITH TOMO: ICD-10-PCS | Mod: 26,,, | Performed by: RADIOLOGY

## 2023-08-07 PROCEDURE — 77067 SCR MAMMO BI INCL CAD: CPT | Mod: 26,,, | Performed by: RADIOLOGY

## 2023-08-07 PROCEDURE — 77067 SCR MAMMO BI INCL CAD: CPT | Mod: TC

## 2023-08-07 PROCEDURE — 77063 BREAST TOMOSYNTHESIS BI: CPT | Mod: 26,,, | Performed by: RADIOLOGY

## 2023-08-07 PROCEDURE — 77067 MAMMO DIGITAL SCREENING BILAT WITH TOMO: ICD-10-PCS | Mod: 26,,, | Performed by: RADIOLOGY

## 2023-08-10 DIAGNOSIS — F17.210 CIGARETTE NICOTINE DEPENDENCE WITHOUT COMPLICATION: Primary | ICD-10-CM

## 2023-08-11 ENCOUNTER — HOSPITAL ENCOUNTER (OUTPATIENT)
Dept: RADIOLOGY | Facility: HOSPITAL | Age: 68
Discharge: HOME | End: 2023-08-11
Attending: NURSE PRACTITIONER
Payer: MEDICARE

## 2023-08-11 DIAGNOSIS — F17.210 CIGARETTE NICOTINE DEPENDENCE WITHOUT COMPLICATION: ICD-10-CM

## 2023-08-11 PROCEDURE — 71250 CT THORAX DX C-: CPT | Mod: 26,,, | Performed by: STUDENT IN AN ORGANIZED HEALTH CARE EDUCATION/TRAINING PROGRAM

## 2023-08-11 PROCEDURE — 71250 CT LOW DOSE CHEST DIAGNOSTIC: ICD-10-PCS | Mod: 26,,, | Performed by: STUDENT IN AN ORGANIZED HEALTH CARE EDUCATION/TRAINING PROGRAM

## 2023-08-11 PROCEDURE — 71250 CT THORAX DX C-: CPT | Mod: TC

## 2023-08-14 ENCOUNTER — OFFICE VISIT (OUTPATIENT)
Dept: OTOLARYNGOLOGY | Facility: CLINIC | Age: 68
End: 2023-08-14
Payer: MEDICARE

## 2023-08-14 VITALS — WEIGHT: 90 LBS | BODY MASS INDEX: 17.58 KG/M2

## 2023-08-14 DIAGNOSIS — K11.8 PAROTID MASS: Primary | ICD-10-CM

## 2023-08-14 PROCEDURE — 99213 OFFICE O/P EST LOW 20 MIN: CPT | Mod: S$GLB,,, | Performed by: OTOLARYNGOLOGY

## 2023-08-14 PROCEDURE — 99999 PR PBB SHADOW E&M-EST. PATIENT-LVL III: CPT | Mod: PBBFAC,,, | Performed by: OTOLARYNGOLOGY

## 2023-08-14 PROCEDURE — 1160F RVW MEDS BY RX/DR IN RCRD: CPT | Mod: CPTII,S$GLB,, | Performed by: OTOLARYNGOLOGY

## 2023-08-14 PROCEDURE — 3044F PR MOST RECENT HEMOGLOBIN A1C LEVEL <7.0%: ICD-10-PCS | Mod: CPTII,S$GLB,, | Performed by: OTOLARYNGOLOGY

## 2023-08-14 PROCEDURE — 1126F PR PAIN SEVERITY QUANTIFIED, NO PAIN PRESENT: ICD-10-PCS | Mod: CPTII,S$GLB,, | Performed by: OTOLARYNGOLOGY

## 2023-08-14 PROCEDURE — 99999 PR PBB SHADOW E&M-EST. PATIENT-LVL III: ICD-10-PCS | Mod: PBBFAC,,, | Performed by: OTOLARYNGOLOGY

## 2023-08-14 PROCEDURE — 1159F PR MEDICATION LIST DOCUMENTED IN MEDICAL RECORD: ICD-10-PCS | Mod: CPTII,S$GLB,, | Performed by: OTOLARYNGOLOGY

## 2023-08-14 PROCEDURE — 3008F BODY MASS INDEX DOCD: CPT | Mod: CPTII,S$GLB,, | Performed by: OTOLARYNGOLOGY

## 2023-08-14 PROCEDURE — 3044F HG A1C LEVEL LT 7.0%: CPT | Mod: CPTII,S$GLB,, | Performed by: OTOLARYNGOLOGY

## 2023-08-14 PROCEDURE — 99213 PR OFFICE/OUTPT VISIT, EST, LEVL III, 20-29 MIN: ICD-10-PCS | Mod: S$GLB,,, | Performed by: OTOLARYNGOLOGY

## 2023-08-14 PROCEDURE — 1159F MED LIST DOCD IN RCRD: CPT | Mod: CPTII,S$GLB,, | Performed by: OTOLARYNGOLOGY

## 2023-08-14 PROCEDURE — 3008F PR BODY MASS INDEX (BMI) DOCUMENTED: ICD-10-PCS | Mod: CPTII,S$GLB,, | Performed by: OTOLARYNGOLOGY

## 2023-08-14 PROCEDURE — 1160F PR REVIEW ALL MEDS BY PRESCRIBER/CLIN PHARMACIST DOCUMENTED: ICD-10-PCS | Mod: CPTII,S$GLB,, | Performed by: OTOLARYNGOLOGY

## 2023-08-14 PROCEDURE — 1126F AMNT PAIN NOTED NONE PRSNT: CPT | Mod: CPTII,S$GLB,, | Performed by: OTOLARYNGOLOGY

## 2023-08-14 RX ORDER — CHLORHEXIDINE GLUCONATE ORAL RINSE 1.2 MG/ML
SOLUTION DENTAL
COMMUNITY
Start: 2023-06-19

## 2023-08-14 RX ORDER — LEVOTHYROXINE SODIUM 25 UG/1
TABLET ORAL
COMMUNITY
Start: 2023-06-19

## 2023-08-14 NOTE — PROGRESS NOTES
CC: Parotid masses      HPI   67 y.o. female presents with a history of parotidectomy for left parotid mass.  Pathology was benign.  She recently noted swelling of the left parotid possibly consistent with acute parotitis.  She reports a flare of her left parotid since her last visit but states that it has improved.    Review of Systems   Constitutional: Negative for fatigue and unexpected weight change.   HENT: Per HPI.  Eyes: Negative for visual disturbance.   Respiratory: Negative for shortness of breath, hemoptysis   Cardiovascular: Negative for chest pain and palpitations.   Musculoskeletal: Negative for decreased ROM, back pain.   Skin: Negative for rash, sunburn, itching.   Neurological: Negative for dizziness and seizures.   Hematological: Negative for adenopathy. Does not bruise/bleed easily.   Endocrine: Negative for rapid weight loss/weight gain, heat/cold intolerance.     Past Medical History   Patient Active Problem List   Diagnosis    Anxiety and depression    Benign essential hypertension    Mixed hyperlipidemia    History of stroke    Chronic bilateral low back pain without sciatica    Long-term use of aspirin therapy    Former smoker    History of seizures    Postmenopausal osteoporosis    Vitamin D deficiency    History of alcohol abuse    H/O umbilical hernia repair    Abnormal TSH    History of squamous cell carcinoma of skin    Trichomonal vaginitis    Wears dentures    Poor dentition    Parotid mass    Tobacco abuse           Past Surgical History   Past Surgical History:   Procedure Laterality Date    ANTERIOR VAGINAL REPAIR      CATARACT EXTRACTION Right 10/16/2007    Dr. Ramsey //yag 5/1/18 Dr. Bentley    CATARACT EXTRACTION W/  INTRAOCULAR LENS IMPLANT Left 10/30/2007    Dr. Ramsey// yag 5/1/18 Dr. Bentley    CHOLECYSTECTOMY      COLONOSCOPY N/A 4/19/2018    Procedure: COLONOSCOPY;  Surgeon: Yi Goncalves MD;  Location: [facility];  Service: Endoscopy;  Laterality: N/A;    COLONOSCOPY N/A  2022    Procedure: COLONOSCOPY Golytely Vaccinated;  Surgeon: Brook Sifuentes MD;  Location: [facility] ENDO;  Service: Endoscopy;  Laterality: N/A;    EXCISION OF PAROTID GLAND Left 2022    Procedure: EXCISION, PAROTID GLAND;  Surgeon: Kit Conley MD;  Location: [address];  Service: ENT;  Laterality: Left;    EYE SURGERY      ROBOT-ASSISTED LAPAROSCOPIC REPAIR OF VENTRAL HERNIA N/A 2021    Procedure: ROBOTIC REPAIR, HERNIA, VENTRAL;  Surgeon: Alli Dumont Jr., MD;  Location: [facility] OR;  Service: General;  Laterality: N/A;    TONSILLECTOMY      UMBILICAL HERNIA REPAIR N/A 2019    Procedure: REPAIR, HERNIA, UMBILICAL, AGE 5 YEARS OR OLDER, open with/without mesh;  Surgeon: Magno Mishra MD;  Location: [address];  Service: General;  Laterality: N/A;         Family History   Family History   Problem Relation Age of Onset    Breast cancer Daughter     Blindness Neg Hx     Glaucoma Neg Hx     Macular degeneration Neg Hx     Retinal detachment Neg Hx            Social History   .  Social History     Socioeconomic History    Marital status:    Tobacco Use    Smoking status: Every Day     Current packs/day: 0.00     Types: Cigarettes     Last attempt to quit: 2019     Years since quittin.2    Smokeless tobacco: Never    Tobacco comments:     started smoking again in 2020(when nervous/bored)   Substance and Sexual Activity    Alcohol use: Yes     Comment: 2-3 week    Drug use: Yes     Types: Marijuana     Comment: intermittent to take the edge off of her anxiety    Sexual activity: Never         Allergies   Review of patient's allergies indicates:   Allergen Reactions    Versed [midazolam]      IV Versed altered mental status           Physical Exam     There were no vitals filed for this visit.        Body mass index is 17.58 kg/m².      General: AOx3, NAD   Respiratory:  Symmetric chest rise, normal effort  Oral Cavity:  Oral Tongue mobile, no lesions noted.  Hard Palate WNL. No buccal or FOM lesions.  Oropharynx:  No masses/lesions of the posterior pharyngeal wall. Tonsillar fossa without lesions. Soft palate without masses. Midline uvula.   Neck: No scars.  No cervical lymphadenopathy, thyromegaly or thyroid nodules.  Normal range of motion.  Well-healed left parotidectomy scar.  Face: House Brackmann I bilaterally.       Assessment/Plan  Problem List Items Addressed This Visit          ENT    Parotid mass - Primary     Left parotitis.  Resolved.  Return p.r.n..

## 2023-08-15 ENCOUNTER — HOSPITAL ENCOUNTER (OUTPATIENT)
Dept: RADIOLOGY | Facility: HOSPITAL | Age: 68
Discharge: HOME | End: 2023-08-15
Attending: NURSE PRACTITIONER
Payer: MEDICARE

## 2023-08-15 DIAGNOSIS — R92.8 ABNORMAL MAMMOGRAM: ICD-10-CM

## 2023-08-15 PROCEDURE — 77065 DX MAMMO INCL CAD UNI: CPT | Mod: 26,LT,, | Performed by: RADIOLOGY

## 2023-08-15 PROCEDURE — 77065 MAMMO DIGITAL DIAGNOSTIC LEFT WITH TOMO: ICD-10-PCS | Mod: 26,LT,, | Performed by: RADIOLOGY

## 2023-08-15 PROCEDURE — 77061 BREAST TOMOSYNTHESIS UNI: CPT | Mod: TC,LT

## 2023-08-15 PROCEDURE — 77061 MAMMO DIGITAL DIAGNOSTIC LEFT WITH TOMO: ICD-10-PCS | Mod: 26,LT,, | Performed by: RADIOLOGY

## 2023-08-15 PROCEDURE — 77061 BREAST TOMOSYNTHESIS UNI: CPT | Mod: 26,LT,, | Performed by: RADIOLOGY

## 2025-03-20 NOTE — PROGRESS NOTES
Chief Complaint   Patient presents with    Post-op Evaluation       HPI   66 y.o. female returns for a post op visit. She has been doing well since surgery. No complaints.    Review of Systems   Constitutional: Negative for fatigue and unexpected weight change.   HENT: Per HPI.  Eyes: Negative for visual disturbance.   Respiratory: Negative for shortness of breath, hemoptysis   Cardiovascular: Negative for chest pain and palpitations.   Musculoskeletal: Negative for decreased ROM, back pain.   Skin: Negative for rash, sunburn, itching.   Neurological: Negative for dizziness and seizures.   Hematological: Negative for adenopathy. Does not bruise/bleed easily.   Endocrine: Negative for rapid weight loss/weight gain, heat/cold intolerance.     Past Medical History   Patient Active Problem List   Diagnosis    Anxiety and depression    Benign essential hypertension    Mixed hyperlipidemia    History of stroke    Chronic bilateral low back pain without sciatica    Long-term use of aspirin therapy    Former smoker    History of seizures    Postmenopausal osteoporosis    Vitamin D deficiency    History of alcohol abuse    H/O umbilical hernia repair    Abnormal TSH    History of squamous cell carcinoma of skin    Trichomonal vaginitis    Wears dentures    Poor dentition    Parotid mass    Tobacco abuse           Past Surgical History   Past Surgical History:   Procedure Laterality Date    ANTERIOR VAGINAL REPAIR      CATARACT EXTRACTION Right 10/16/2007    Dr. aRmsey //yag 5/1/18 Dr. Bentley    CATARACT EXTRACTION W/  INTRAOCULAR LENS IMPLANT Left 10/30/2007    Dr. Ramsey// yag 5/1/18 Dr. Bentley    CHOLECYSTECTOMY      COLONOSCOPY N/A 4/19/2018    Procedure: COLONOSCOPY;  Surgeon: Yi Goncalves MD;  Location: [facility];  Service: Endoscopy;  Laterality: N/A;    EXCISION OF PAROTID GLAND Left 7/8/2022    Procedure: EXCISION, PAROTID GLAND;  Surgeon: Kit Conley MD;  Location: [address]  [facility]    Phone Message    May a detailed message be left on voicemail: yes     Reason for Call: Other: Please respond to dupixent fax. Thank you     Action Taken: TE SENT    Travel Screening: Not Applicable     Date of Service:             Thank you!  [facility]                                                           [facility];  Service: ENT;  Laterality: Left;    EYE SURGERY      ROBOT-ASSISTED LAPAROSCOPIC REPAIR OF VENTRAL HERNIA N/A 6/28/2021    Procedure: ROBOTIC REPAIR, HERNIA, VENTRAL;  Surgeon: Alli Dumont Jr., MD;  Location: [facility];  Service: General;  Laterality: N/A;    TONSILLECTOMY      UMBILICAL HERNIA REPAIR N/A 2/8/2019    Procedure: REPAIR, HERNIA, UMBILICAL, AGE 5 YEARS OR OLDER, open with/without mesh;  Surgeon: Magno Mishra MD;  Location: [address];  Service: General;  Laterality: N/A;         Family History   Family History   Problem Relation Age of Onset    Blindness Neg Hx     Glaucoma Neg Hx     Macular degeneration Neg Hx     Retinal detachment Neg Hx            Social History   .  Social History     Socioeconomic History    Marital status:    Tobacco Use    Smoking status: Light Tobacco Smoker     Types: Cigarettes     Last attempt to quit: 5/5/2019     Years since quitting: 3.2    Smokeless tobacco: Never Used    Tobacco comment: started smoking again in 12/2020(when nervous/bored)   Substance and Sexual Activity    Alcohol use: Yes     Comment: 1-2 drinks daily    Drug use: Yes     Types: Marijuana     Comment: intermittent to take the edge off of her anxiety    Sexual activity: Never         Allergies   Review of patient's allergies indicates:   Allergen Reactions    Versed [midazolam]      IV Versed altered mental status           Physical Exam     Vitals:    08/01/22 1107   BP: 128/78   Pulse: 91   Temp: 97.8 °F (36.6 °C)         Body mass index is 18.79 kg/m².      General: AOx3, NAD   Respiratory:  Symmetric chest rise, normal effort  Neck: Left neck incision CDI.  No redness, drainage, or fluid collection noted.  Edges well approximated.  Face: House Brackmann I bilaterally.         Assessment/Plan  Problem List Items Addressed This Visit        ENT    Parotid mass - Primary     Doing well. Path report and scar care discussed. Questions answered. RTC As  needed.

## (undated) DEVICE — EVACUATOR WOUND BULB 100CC

## (undated) DEVICE — ELECTRODE REM PLYHSV RETURN 9

## (undated) DEVICE — DRAIN CHANNEL ROUND 10FR

## (undated) DEVICE — SPONGE DERMACEL 4PLY 2X2

## (undated) DEVICE — DRAPE INCISE IOBAN 2 23X17IN

## (undated) DEVICE — NDL 22GA X1 1/2 REG BEVEL

## (undated) DEVICE — COVER LIGHT HANDLE 80/CA

## (undated) DEVICE — OBTURATOR BLADELESS 8MM XI CLR

## (undated) DEVICE — KIT WING PAD POSITIONING

## (undated) DEVICE — HOOK LONE STAR BLUNT 12MM

## (undated) DEVICE — TRAY MINOR GEN SURG

## (undated) DEVICE — SUT MCRYL PLUS 4-0 PS2 27IN

## (undated) DEVICE — SEE MEDLINE ITEM 154981

## (undated) DEVICE — SKINMARKER & RULER REGULAR X-F

## (undated) DEVICE — ELECTRODE NDL

## (undated) DEVICE — SUT 2-0 12-18IN SILK

## (undated) DEVICE — SUT COATED VICRYL 4/0 27IN

## (undated) DEVICE — CLIP MED TICALL

## (undated) DEVICE — SUT VICRYL PLUS 4-0 P3 18IN

## (undated) DEVICE — GOWN SURGICAL X-LARGE

## (undated) DEVICE — PROBE SIMULATOR KRAFF

## (undated) DEVICE — GLOVE SURGICAL LATEX SZ 7

## (undated) DEVICE — SEAL UNIVERSAL 5MM-8MM XI

## (undated) DEVICE — SHEET EENT SPLIT

## (undated) DEVICE — SUT PROLENE 0 MO6 30IN BLUE

## (undated) DEVICE — CORD BIPOLAR 12 FOOT

## (undated) DEVICE — SUT VICRYL 3-0 27 SH

## (undated) DEVICE — GAUZE SPONGE 4X4 12PLY

## (undated) DEVICE — DRAPE ARM DAVINCI XI

## (undated) DEVICE — DRAPE STERI INSTRUMENT 1018

## (undated) DEVICE — SUT PROLENE 0 CR/MO-6 8/18

## (undated) DEVICE — BLADE SURG #15 CARBON STEEL

## (undated) DEVICE — CONTAINER SPECIMEN STRL 4OZ

## (undated) DEVICE — NDL HYPO REG 25G X 1 1/2

## (undated) DEVICE — SUT LIGACLIP SMALL XTRA

## (undated) DEVICE — CLOSURE SKIN STERI STRIP 1/4X3

## (undated) DEVICE — DRAPE HALF SURGICAL 40X58IN

## (undated) DEVICE — NDL INSUF ULTRA VERESS 120MM

## (undated) DEVICE — SEE MEDLINE ITEM 157194

## (undated) DEVICE — SEE MEDLINE ITEM 157148

## (undated) DEVICE — ADHESIVE DERMABOND ADVANCED

## (undated) DEVICE — SUT VICRYL PLUS 3-0 SH 18IN

## (undated) DEVICE — SPONGE GAUZE 16PLY 4X4

## (undated) DEVICE — SEE MEDLINE ITEM 157117

## (undated) DEVICE — DRESSING AQUACEL SACRAL 9 X 9

## (undated) DEVICE — SEE MEDLINE ITEM 156952

## (undated) DEVICE — COVER TIP CURVED SCISSORS XI

## (undated) DEVICE — MANIFOLD 4 PORT

## (undated) DEVICE — ELECTRODE BLADE INSULATED 1 IN

## (undated) DEVICE — GAUZE SPONGE PEANUT STRL

## (undated) DEVICE — TOWEL OR DISP STRL BLUE 4/PK

## (undated) DEVICE — DRESSING TEGADERM 4.4X5IN